# Patient Record
Sex: FEMALE | Race: WHITE | NOT HISPANIC OR LATINO | Employment: UNEMPLOYED | URBAN - NONMETROPOLITAN AREA
[De-identification: names, ages, dates, MRNs, and addresses within clinical notes are randomized per-mention and may not be internally consistent; named-entity substitution may affect disease eponyms.]

---

## 2019-07-03 PROCEDURE — 99283 EMERGENCY DEPT VISIT LOW MDM: CPT

## 2019-07-04 ENCOUNTER — HOSPITAL ENCOUNTER (EMERGENCY)
Facility: HOSPITAL | Age: 44
Discharge: HOME | End: 2019-07-04
Attending: EMERGENCY MEDICINE | Admitting: EMERGENCY MEDICINE

## 2019-07-04 VITALS
SYSTOLIC BLOOD PRESSURE: 155 MMHG | TEMPERATURE: 97.3 F | HEIGHT: 63 IN | WEIGHT: 198.4 LBS | RESPIRATION RATE: 20 BRPM | BODY MASS INDEX: 35.15 KG/M2 | OXYGEN SATURATION: 94 % | DIASTOLIC BLOOD PRESSURE: 106 MMHG | HEART RATE: 118 BPM

## 2019-07-04 DIAGNOSIS — K02.9 PAIN DUE TO DENTAL CARIES: Primary | ICD-10-CM

## 2019-07-04 RX ORDER — LOSARTAN POTASSIUM 50 MG/1
25 TABLET ORAL DAILY
COMMUNITY
End: 2022-11-05 | Stop reason: HOSPADM

## 2019-07-04 RX ORDER — CLINDAMYCIN HYDROCHLORIDE 300 MG/1
300 CAPSULE ORAL 4 TIMES DAILY
Qty: 40 CAPSULE | Refills: 0 | Status: SHIPPED | OUTPATIENT
Start: 2019-07-04 | End: 2019-07-14

## 2019-07-04 RX ORDER — CLINDAMYCIN HYDROCHLORIDE 150 MG/1
300 CAPSULE ORAL ONCE
Status: COMPLETED | OUTPATIENT
Start: 2019-07-04 | End: 2019-07-04

## 2019-07-04 RX ORDER — CEPHALEXIN 500 MG/1
500 CAPSULE ORAL 4 TIMES DAILY
COMMUNITY
End: 2019-07-04

## 2019-07-04 RX ORDER — HYDROCODONE BITARTRATE AND ACETAMINOPHEN 5; 325 MG/1; MG/1
1 TABLET ORAL ONCE
Status: COMPLETED | OUTPATIENT
Start: 2019-07-04 | End: 2019-07-04

## 2019-07-04 RX ADMIN — HYDROCODONE BITARTRATE AND ACETAMINOPHEN 1 TABLET: 5; 325 TABLET ORAL at 00:42

## 2019-07-04 RX ADMIN — CLINDAMYCIN HYDROCHLORIDE 300 MG: 150 CAPSULE ORAL at 00:42

## 2019-07-04 RX ADMIN — TOPICAL ANESTHETIC 1 SPRAY: 200 SPRAY DENTAL; PERIODONTAL at 00:39

## 2019-07-04 RX ADMIN — LIDOCAINE HYDROCHLORIDE 5 ML: 20 SOLUTION ORAL; TOPICAL at 00:39

## 2019-07-04 NOTE — ED PROVIDER NOTES
Subjective   43-year-old female presents to the ER with toothache that began 4 days ago.  Patient states that is the left lower side.  He states that she called her primary care doctor and they called her and Keflex which she has been taking for the past 4 days.  She continues to have pain despite this.  She denies any facial pain, swelling, fever, chills, other systemic symptoms.        History provided by:  Patient   used: No    Dental Pain   Location:  Lower  Quality:  Aching, localized and throbbing  Severity:  Moderate  Onset quality:  Sudden  Timing:  Constant  Progression:  Unchanged  Chronicity:  New  Context: dental caries    Relieved by:  None tried  Worsened by:  Pressure and touching  Ineffective treatments:  None tried  Associated symptoms: no congestion, no difficulty swallowing, no facial pain, no facial swelling, no fever, no gum swelling, no headaches, no neck pain and no neck swelling    Risk factors: lack of dental care and smoking        Review of Systems   Constitutional: Negative for activity change and fever.   HENT: Negative for congestion, facial swelling, rhinorrhea and sore throat.    Eyes: Negative for pain and redness.   Respiratory: Negative for cough, shortness of breath and wheezing.    Cardiovascular: Negative for chest pain.   Gastrointestinal: Negative for abdominal distention, diarrhea, nausea and vomiting.   Endocrine: Negative for polyphagia and polyuria.   Genitourinary: Negative for decreased urine volume, difficulty urinating and dysuria.   Musculoskeletal: Negative for arthralgias, myalgias and neck pain.   Skin: Negative for rash and wound.   Allergic/Immunologic: Negative for food allergies and immunocompromised state.   Neurological: Negative for dizziness and headaches.   Hematological: Negative for adenopathy. Does not bruise/bleed easily.   Psychiatric/Behavioral: Negative for agitation and confusion.   All other systems reviewed and are  negative.      Past Medical History:   Diagnosis Date   • Hypertension        Allergies   Allergen Reactions   • Codeine Nausea And Vomiting   • Penicillins Unknown (See Comments)     As a child        Past Surgical History:   Procedure Laterality Date   • TUBAL ABDOMINAL LIGATION         History reviewed. No pertinent family history.    Social History     Tobacco Use   • Smoking status: Current Every Day Smoker     Packs/day: 0.50     Types: Cigarettes   Substance and Sexual Activity   • Alcohol use: No     Frequency: Never   • Drug use: No   • Sexual activity: Defer           Objective   Physical Exam   Constitutional: She is oriented to person, place, and time. She appears well-developed and well-nourished.   HENT:   Head: Normocephalic and atraumatic.   Mouth/Throat:       Eyes: EOM are normal. Pupils are equal, round, and reactive to light.   Neck: Normal range of motion. Neck supple.   Cardiovascular: Normal rate, regular rhythm and normal heart sounds.   Pulmonary/Chest: Effort normal and breath sounds normal.   Abdominal: Soft. Bowel sounds are normal.   Musculoskeletal: Normal range of motion.   Neurological: She is alert and oriented to person, place, and time.   Skin: Skin is warm and dry.   Psychiatric: She has a normal mood and affect. Her behavior is normal. Judgment and thought content normal.   Nursing note and vitals reviewed.      Procedures           ED Course                  MDM  Number of Diagnoses or Management Options     Amount and/or Complexity of Data Reviewed  Clinical lab tests: ordered and reviewed  Tests in the radiology section of CPT®: ordered and reviewed  Tests in the medicine section of CPT®: ordered and reviewed    Patient Progress  Patient progress: stable        Final diagnoses:   Pain due to dental caries            Onofre Leonard PA  07/04/19 0026

## 2021-04-13 RX ORDER — METHYLPHENIDATE HYDROCHLORIDE 5 MG/1
5 TABLET ORAL 2 TIMES DAILY
Qty: 60 TABLET | Refills: 0 | Status: SHIPPED | OUTPATIENT
Start: 2021-04-13 | End: 2021-05-12 | Stop reason: SDUPTHER

## 2021-04-14 ENCOUNTER — NURSING HOME (OUTPATIENT)
Dept: INTERNAL MEDICINE | Facility: CLINIC | Age: 46
End: 2021-04-14

## 2021-04-14 DIAGNOSIS — I62.9 INTRACRANIAL BLEED (HCC): Primary | ICD-10-CM

## 2021-04-14 DIAGNOSIS — Z86.718 HISTORY OF DVT (DEEP VEIN THROMBOSIS): ICD-10-CM

## 2021-04-14 DIAGNOSIS — Z98.890 STATUS POST CRANIOTOMY: ICD-10-CM

## 2021-04-14 DIAGNOSIS — Z93.1 STATUS POST INSERTION OF PERCUTANEOUS ENDOSCOPIC GASTROSTOMY (PEG) TUBE (HCC): ICD-10-CM

## 2021-04-14 DIAGNOSIS — R51.9 WORSENING HEADACHES: ICD-10-CM

## 2021-04-14 PROCEDURE — 99305 1ST NF CARE MODERATE MDM 35: CPT | Performed by: INTERNAL MEDICINE

## 2021-04-14 RX ORDER — HYDROCODONE BITARTRATE AND ACETAMINOPHEN 5; 325 MG/1; MG/1
TABLET ORAL
Qty: 120 TABLET | Refills: 0 | Status: SHIPPED | OUTPATIENT
Start: 2021-04-14 | End: 2021-05-04 | Stop reason: SDUPTHER

## 2021-04-15 ENCOUNTER — TRANSCRIBE ORDERS (OUTPATIENT)
Dept: ADMINISTRATIVE | Facility: HOSPITAL | Age: 46
End: 2021-04-15

## 2021-04-15 DIAGNOSIS — I62.9 NON-TRAUMATIC INTRACRANIAL HEMORRHAGE (HCC): Primary | ICD-10-CM

## 2021-04-19 ENCOUNTER — HOSPITAL ENCOUNTER (OUTPATIENT)
Dept: CT IMAGING | Facility: HOSPITAL | Age: 46
Discharge: HOME | End: 2021-04-19
Admitting: INTERNAL MEDICINE

## 2021-04-19 DIAGNOSIS — I62.9 NON-TRAUMATIC INTRACRANIAL HEMORRHAGE (HCC): ICD-10-CM

## 2021-04-19 PROCEDURE — 70450 CT HEAD/BRAIN W/O DYE: CPT

## 2021-04-20 ENCOUNTER — NURSING HOME (OUTPATIENT)
Dept: INTERNAL MEDICINE | Facility: CLINIC | Age: 46
End: 2021-04-20

## 2021-04-20 VITALS
RESPIRATION RATE: 16 BRPM | SYSTOLIC BLOOD PRESSURE: 112 MMHG | BODY MASS INDEX: 33.13 KG/M2 | WEIGHT: 187 LBS | HEART RATE: 84 BPM | TEMPERATURE: 97.8 F | OXYGEN SATURATION: 98 % | DIASTOLIC BLOOD PRESSURE: 70 MMHG

## 2021-04-20 DIAGNOSIS — I82.402 ACUTE DEEP VEIN THROMBOSIS (DVT) OF LEFT LOWER EXTREMITY, UNSPECIFIED VEIN (HCC): ICD-10-CM

## 2021-04-20 DIAGNOSIS — R13.12 OROPHARYNGEAL DYSPHAGIA: ICD-10-CM

## 2021-04-20 DIAGNOSIS — I10 ESSENTIAL HYPERTENSION: ICD-10-CM

## 2021-04-20 DIAGNOSIS — F32.9 MAJOR DEPRESSIVE DISORDER, REMISSION STATUS UNSPECIFIED, UNSPECIFIED WHETHER RECURRENT: ICD-10-CM

## 2021-04-20 DIAGNOSIS — R53.81 DEBILITY: ICD-10-CM

## 2021-04-20 DIAGNOSIS — I62.9 INTRACRANIAL HEMORRHAGE (HCC): Primary | ICD-10-CM

## 2021-04-20 DIAGNOSIS — G89.29 OTHER CHRONIC PAIN: ICD-10-CM

## 2021-04-20 DIAGNOSIS — G93.41 METABOLIC ENCEPHALOPATHY: ICD-10-CM

## 2021-04-20 DIAGNOSIS — E44.1 MILD PROTEIN-CALORIE MALNUTRITION (HCC): ICD-10-CM

## 2021-04-20 PROCEDURE — 99309 SBSQ NF CARE MODERATE MDM 30: CPT | Performed by: PHYSICIAN ASSISTANT

## 2021-04-20 NOTE — PROGRESS NOTES
Nursing Home Progress Note        Doe Stacy DO []  WILEY Gage []  [address] [place] [address]  Phone: [phone]  Fax: [phone] Edwin Hunter MD []  Lasha Flores DO []  Xiomara Davidson PA-C [x]   [address] [place] [address]  Phone: [phone]  Fax: [phone]     PATIENT NAME: Roxi Valerio                                                                          YOB: 1975           DATE OF SERVICE: 04/20/2021  FACILITY:  [] [facility]   [x] [facility]   [] [facility]   [] [facility]   [] [facility]    CHIEF COMPLAINT:  Follow up, intracranial hemorrhage      HISTORY OF PRESENT ILLNESS:   Ms. Valerio is a 45-year of age female with history of intracranial hemorrhage and acute hypoxic respiratory failure.  Patient is status post craniotomy on 2/14.  She had repeat left decompressive craniectomy with duraplasty for malignant brain swelling.  She required tracheostomy and PEG tube placement.  She was then transferred to [facility] for further care.  Patient was successfully decannulated on 3/29.  Diet was advanced by speech therapy.  She continues to utilize tube feeds.  She has significant aphasia, is able to follow some commands and communicate with y/n questions.  She was transferred to this facility, as patient is from [place].  She was recommended to have follow-up with neurosurgery in 1 to 2 weeks regarding plans for bone flap placement or any further intervention.  Unfortunately neurosurgery  located in [place].  She has c/o head pain, subsequently CT as outpatient ordered.  Referral has been placed with  neurosurgery for transfer of care.  Nursing have not noted any continued head pain.  Upon assessment, patient has limited communication, but answers no to any acute complaints.  She is tearful, had a visit with her  today.      PAST MEDICAL & SURGICAL HISTORY:   Past Medical History:   Diagnosis Date   • Hypertension       Past Surgical History:    Procedure Laterality Date   • TUBAL ABDOMINAL LIGATION           MEDICATIONS:  I have reviewed and reconciled the patients medication list in the patients chart at the skilled nursing facility today.      ALLERGIES:  Allergies   Allergen Reactions   • Codeine Nausea And Vomiting   • Penicillins Unknown (See Comments)     As a child          SOCIAL HISTORY:  Social History     Socioeconomic History   • Marital status:      Spouse name: Not on file   • Number of children: Not on file   • Years of education: Not on file   • Highest education level: Not on file   Tobacco Use   • Smoking status: Current Every Day Smoker     Packs/day: 0.50     Types: Cigarettes   Substance and Sexual Activity   • Alcohol use: No   • Drug use: No   • Sexual activity: Defer       FAMILY HISTORY:  No family history on file.    REVIEW OF SYSTEMS:    Positive:  Unable to preform ROS due to aphasia.     PHYSICAL EXAMINATION:     VITAL SIGNS:  /70   Pulse 84   Temp 97.8 °F (36.6 °C)   Resp 16   Wt 84.8 kg (187 lb)   SpO2 98%   BMI 33.13 kg/m²     Constitutional: Tearful, no acute distress  HENT:   Head:  S/p craniotomy, surgical site is clean and intact.     Eyes: Conjunctivae and EOM are normal. Pupils are equal, round, and reactive to light.   Neck: Normal range of motion. Neck supple, previous trach stoma healed. No JVD present.   Cardiovascular: Normal rate, regular rhythm.   Pulmonary/Chest: Effort normal and breath sounds normal. No respiratory distress.   Abdominal: Soft. Bowel sounds are normal. No distention or tenderness.    Musculoskeletal: Normal range of motion. No edema.  Neurological: At baseline, able to point to yes/no on communication board.  Skin: Skin is warm and dry.   Psychiatric: Tearful throughout visit.   Nursing note and vitals reviewed.    RECORDS REVIEW:   CT reviewed, encephalomalacia noted, otherwise no acute intracranial abnormality.     ASSESSMENT     Diagnoses and all orders for this  visit:    1. Intracranial hemorrhage (CMS/HCC) (Primary)    2. Metabolic encephalopathy    3. Acute deep vein thrombosis (DVT) of left lower extremity, unspecified vein (CMS/HCC)    4. Mild protein-calorie malnutrition (CMS/HCC)    5. Oropharyngeal dysphagia    6. Essential hypertension    7. Other chronic pain    8. Debility    9. Major depressive disorder, remission status unspecified, unspecified whether recurrent        PLAN  Intracranial hemorrhage status post craniotomy: Referral has been placed to establish care with neurosurgery that is local.  CT reviewed noted some encephalomalacia otherwise no intracranial abnormalities.  No reports continued pain.  She continues to use helmet.  Continue to monitor closely.    Encephalopathy: Improving.  She is communicating well with staff and participating in therapy services.  Acute DVT: Underwent IR guided lysis on 211, unfortunately cranial hemorrhage afterwards.  IVC filter in place.  She remains on Lovenox.    Mild protein calorie malnutrition/dysphagia: Continues following with SLP.  She is tolerating tube feeding well.  Diet has been advanced and she is tolerating p.o. intake.  She is sitting up in wheelchair eating lunch upon assessment today.  No coughing or strangulation noted.    Hypertension: Controlled.  Continue current medications.      Chronic pain: Controlled.  Continue oxycodone.    Debility: Continue supportive care at SNF with ADLs.    Depression: Is very tearful during assessment today.  She is visiting regularly with her family.  We will start Celexa 10 mg daily and place referral to in-house psych services.     [x]  Discussed Patient in detail with nursing/staff, addressed all needs today.     [x]  Plan of Care Reviewed   []  PT/OT Reviewed   []  Order Changes  []  Discharge Plans Reviewed  [x]  Advance Directive on file with Nursing Home.   [x]  POA on file with Nursing Home.    [x]  Code Status listed:  [x]  Full Code   []  DNR         Xiomara  KOLTON Davidson.  4/21/2021

## 2021-04-27 ENCOUNTER — NURSING HOME (OUTPATIENT)
Dept: INTERNAL MEDICINE | Facility: CLINIC | Age: 46
End: 2021-04-27

## 2021-04-27 DIAGNOSIS — I82.402 ACUTE DEEP VEIN THROMBOSIS (DVT) OF LEFT LOWER EXTREMITY, UNSPECIFIED VEIN (HCC): ICD-10-CM

## 2021-04-27 DIAGNOSIS — G89.29 OTHER CHRONIC PAIN: ICD-10-CM

## 2021-04-27 DIAGNOSIS — R13.12 OROPHARYNGEAL DYSPHAGIA: ICD-10-CM

## 2021-04-27 DIAGNOSIS — E44.1 MILD PROTEIN-CALORIE MALNUTRITION (HCC): ICD-10-CM

## 2021-04-27 DIAGNOSIS — G93.41 METABOLIC ENCEPHALOPATHY: ICD-10-CM

## 2021-04-27 DIAGNOSIS — I62.9 INTRACRANIAL HEMORRHAGE (HCC): Primary | ICD-10-CM

## 2021-04-27 DIAGNOSIS — F32.9 MAJOR DEPRESSIVE DISORDER, REMISSION STATUS UNSPECIFIED, UNSPECIFIED WHETHER RECURRENT: ICD-10-CM

## 2021-04-27 DIAGNOSIS — R53.81 DEBILITY: ICD-10-CM

## 2021-04-27 DIAGNOSIS — I10 ESSENTIAL HYPERTENSION: ICD-10-CM

## 2021-04-27 PROCEDURE — 99308 SBSQ NF CARE LOW MDM 20: CPT | Performed by: PHYSICIAN ASSISTANT

## 2021-05-03 VITALS
RESPIRATION RATE: 16 BRPM | DIASTOLIC BLOOD PRESSURE: 82 MMHG | TEMPERATURE: 98.2 F | OXYGEN SATURATION: 96 % | HEART RATE: 72 BPM | SYSTOLIC BLOOD PRESSURE: 117 MMHG

## 2021-05-03 NOTE — PROGRESS NOTES
Nursing Home Progress Note        Doe Stacy DO []  WILEY Gage []  [address] [place] [address]  Phone: [phone]  Fax: [phone] Edwin Hunter MD []  Lasha Flores DO []  Xiomara Davidson PA-C [x]   [address] [place] [address]  Phone: [phone]  Fax: [phone]     PATIENT NAME: Roxi Valerio                                                                          YOB: 1975           DATE OF SERVICE: 04/27/2021  FACILITY:  [] [facility]   [x] [place]   [] [facility]   [] [facility]   [] [facility]    CHIEF COMPLAINT:  Follow up, intracranial hemorrhage      HISTORY OF PRESENT ILLNESS:   Ms. Valerio is a 45-year of age female with history of intracranial hemorrhage and acute hypoxic respiratory failure.  Patient is status post craniotomy on 2/14.  She had repeat left decompressive craniectomy with duraplasty for malignant brain swelling.  She required tracheostomy and PEG tube placement.      Patient has remained in stable condition since last assessment.  She continues to improve in regards to communication.  Therapy are working with communication boards.  She is also tolerating PO intake well.  Often refuses tube feeds so that she may enjoy the food her  brings to her.  Weights are stable.  She has referral placed with [facility], appoitment scheduled.  Upon assessment today she is eating a Big Mac, is smiling and in good spirits.  She denies any acute concerns.     PAST MEDICAL & SURGICAL HISTORY:   Past Medical History:   Diagnosis Date   • Hypertension       Past Surgical History:   Procedure Laterality Date   • TUBAL ABDOMINAL LIGATION           MEDICATIONS:  I have reviewed and reconciled the patients medication list in the patients chart at the skilled nursing facility today.      ALLERGIES:  Allergies   Allergen Reactions   • Codeine Nausea And Vomiting   • Penicillins Unknown (See Comments)     As a child          SOCIAL  HISTORY:  Social History     Socioeconomic History   • Marital status:      Spouse name: Not on file   • Number of children: Not on file   • Years of education: Not on file   • Highest education level: Not on file   Tobacco Use   • Smoking status: Current Every Day Smoker     Packs/day: 0.50     Types: Cigarettes   Substance and Sexual Activity   • Alcohol use: No   • Drug use: No   • Sexual activity: Defer       FAMILY HISTORY:  No family history on file.    REVIEW OF SYSTEMS:    Positive:  Unable to preform ROS due to aphasia.     PHYSICAL EXAMINATION:     VITAL SIGNS:  /82   Pulse 72   Temp 98.2 °F (36.8 °C)   Resp 16   SpO2 96%     Constitutional: Smiling, no acute distress  HENT:   Head:  S/p craniotomy, surgical site is clean and intact.     Eyes: Conjunctivae and EOM are normal. Pupils are equal, round, and reactive to light.   Neck: Normal range of motion. Neck supple, previous trach stoma healed. No JVD present.   Cardiovascular: Normal rate, regular rhythm.   Pulmonary/Chest: Effort normal and breath sounds normal. No respiratory distress.   Abdominal: Soft. Bowel sounds are normal. No distention or tenderness.  S/p PEG  Musculoskeletal: Normal range of motion. No edema.    Neurological: At baseline, able to point to yes/no on communication board.  Skin: Skin is warm and dry.   Psychiatric: Smiling, in good spirits/mood  Nursing note and vitals reviewed.    RECORDS REVIEW:   N/A.     ASSESSMENT     Diagnoses and all orders for this visit:    1. Intracranial hemorrhage (CMS/HCC) (Primary)    2. Metabolic encephalopathy    3. Acute deep vein thrombosis (DVT) of left lower extremity, unspecified vein (CMS/HCC)    4. Mild protein-calorie malnutrition (CMS/HCC)    5. Oropharyngeal dysphagia    6. Essential hypertension    7. Other chronic pain    8. Debility    9. Major depressive disorder, remission status unspecified, unspecified whether recurrent        PLAN  Intracranial hemorrhage status  post craniotomy: Has appointment with neurosurgery at .  Continue working with therapy services for continued improvement to cognition.  She denies any continued head pain.     Encephalopathy: Improving.  She is communicating well with staff and participating in therapy services.    Acute DVT: Underwent IR guided lysis on 2/11, unfortunately cranial hemorrhage afterwards.  IVC filter in place.  She remains on Lovenox.    Mild protein calorie malnutrition/dysphagia: Continues following with SLP.  Refusing tube feeds so she may enjoy food brought by her .  Nursing continue to monitor weights closely.    Diet has been advanced and she is tolerating p.o. intake.  She is sitting up in wheelchair eating lunch upon assessment today.  No coughing or strangulation noted.    Hypertension: Controlled.  Continue current medications.      Chronic pain: Controlled.  Continue oxycodone.    Debility: Continue supportive care at SNF with ADLs.    Depression: Mood improved since last assessment.  She was recently started on celexa.     [x]  Discussed Patient in detail with nursing/staff, addressed all needs today.     [x]  Plan of Care Reviewed   []  PT/OT Reviewed   []  Order Changes  []  Discharge Plans Reviewed  [x]  Advance Directive on file with Nursing Home.   [x]  POA on file with Nursing Home.    [x]  Code Status listed:  [x]  Full Code   []  DNR         Xiomara Davidson PA-C.  5/3/2021

## 2021-05-04 ENCOUNTER — NURSING HOME (OUTPATIENT)
Dept: INTERNAL MEDICINE | Facility: CLINIC | Age: 46
End: 2021-05-04

## 2021-05-04 VITALS
HEART RATE: 75 BPM | TEMPERATURE: 97.9 F | OXYGEN SATURATION: 97 % | DIASTOLIC BLOOD PRESSURE: 53 MMHG | SYSTOLIC BLOOD PRESSURE: 91 MMHG | RESPIRATION RATE: 20 BRPM

## 2021-05-04 DIAGNOSIS — R47.01 APHASIA: ICD-10-CM

## 2021-05-04 DIAGNOSIS — I62.9 INTRACRANIAL HEMORRHAGE (HCC): Primary | ICD-10-CM

## 2021-05-04 DIAGNOSIS — G93.41 METABOLIC ENCEPHALOPATHY: ICD-10-CM

## 2021-05-04 DIAGNOSIS — I82.402 ACUTE DEEP VEIN THROMBOSIS (DVT) OF LEFT LOWER EXTREMITY, UNSPECIFIED VEIN (HCC): ICD-10-CM

## 2021-05-04 PROCEDURE — 99310 SBSQ NF CARE HIGH MDM 45: CPT | Performed by: PHYSICIAN ASSISTANT

## 2021-05-04 RX ORDER — HYDROCODONE BITARTRATE AND ACETAMINOPHEN 5; 325 MG/1; MG/1
TABLET ORAL
Qty: 120 TABLET | Refills: 0 | Status: SHIPPED | OUTPATIENT
Start: 2021-05-04 | End: 2021-05-25 | Stop reason: SDUPTHER

## 2021-05-04 NOTE — PROGRESS NOTES
Nursing Home Progress Note        Doe Stacy DO []  WILEY Gage []  [address] [place] [address]  Phone: [phone]  Fax: [phone] Edwin Hunter MD []  Lasha Flores DO []  Xiomara Davidson PA-C [x]   [address] [place] [address]  Phone: [phone]  Fax: [phone]     PATIENT NAME: Roxi Valerio                                                                          YOB: 1975           DATE OF SERVICE: 5/4/2021  FACILITY: [facility]    CHIEF COMPLAINT:  Arm pain      HISTORY OF PRESENT ILLNESS:   Ms. Valerio is a 45-year of age female with history of intracranial hemorrhage and acute hypoxic respiratory failure.  Patient is status post craniotomy on 2/14.  She had repeat left decompressive craniectomy with duraplasty for malignant brain swelling.  She required tracheostomy and PEG tube placement.  Has residual right sided weakness.      Patient presents today at request of nursing staff with complaint of right arm pain.  Patient has limited communication due to aphasia.  Myself and nurse present at bedside, patient is crying having difficulty with using communication board.  Therapy at bedside as well to supplement history.  Previous history of residual deficit to the right upper extremity, essentially flaccid.  Therapy report that patient has regained some tone to that extremity and they have been working with ROM exercises.  Patient began with onset of pain less then 24 hrs ago.  She describes that patient has been progressively worsening, improved after use of hydrocodone.  Denies any recent trauma, nursing do not note any recent falls.  Pain is diffuse to the elbow, wrist, shoulder.  She reports that movement intensifies the pain.      PAST MEDICAL & SURGICAL HISTORY:   Past Medical History:   Diagnosis Date   • Hypertension       Past Surgical History:   Procedure Laterality Date   • TUBAL ABDOMINAL LIGATION           MEDICATIONS:  I  have reviewed and reconciled the patients medication list in the patients chart at the skilled nursing facility today.      ALLERGIES:  Allergies   Allergen Reactions   • Codeine Nausea And Vomiting   • Penicillins Unknown (See Comments)     As a child          SOCIAL HISTORY:  Social History     Socioeconomic History   • Marital status:      Spouse name: Not on file   • Number of children: Not on file   • Years of education: Not on file   • Highest education level: Not on file   Tobacco Use   • Smoking status: Current Every Day Smoker     Packs/day: 0.50     Types: Cigarettes   Substance and Sexual Activity   • Alcohol use: No   • Drug use: No   • Sexual activity: Defer       FAMILY HISTORY:  No family history on file.    REVIEW OF SYSTEMS:    Limited due to aphasia  Right arm pain, aphasia, debility.     PHYSICAL EXAMINATION:     VITAL SIGNS:  BP 91/53   Pulse 75   Temp 97.9 °F (36.6 °C)   Resp 20   SpO2 97%        Repeat BP:  106/65    Constitutional: Chronically ill appearing female sitting upright in bed.   HENT:   Head: Normocephalic and atraumatic.   Eyes: Conjunctivae and EOM are normal. Pupils are equal, round, and reactive to light.   Neck: Normal range of motion. Neck supple. No JVD present.   Cardiovascular: Normal rate, regular rhythm and normal heart sounds.   Pulmonary/Chest: Effort normal and breath sounds normal. No respiratory distress.   Abdominal: Soft. Bowel sounds are normal. No distention or tenderness.    Musculoskeletal: Right sided weakness, no edema.   Neurological: Alert and oriented at baseline.  Skin: Skin is warm and dry.   Psychiatric: Tearful, anxious.   RUE: Neurovascularly intact.  Residual weakness noted.  No edema.  Pain with palpation especially range of motion to the wrist elbow and shoulder.  No obvious deformities.  No streaking.  The extremity is not hot to touch.  Nursing note and vitals reviewed.    RECORDS REVIEW:   N/A.     ASSESSMENT     Diagnoses and all  orders for this visit:    1. Intracranial hemorrhage (CMS/HCC) (Primary)    2. Metabolic encephalopathy    3. Acute deep vein thrombosis (DVT) of left lower extremity, unspecified vein (CMS/HCC)    4. Aphasia        PLAN  She continues to have improvement to her overall cognition, is tolerating PO diet well.  Working with therapy services daily, with notable improvement.   Unfortunately patient has developed RUE pain.  No trauma or specific injury reported.  Increased pain is most likely due to patient have increased tone to the extremity and increased ROM to the extremity during PT/OT.  Initially attempted topical biofreeze, patient reported this made her pain worse.  Will use plain lidocaine cream every 12 hours PRN.  Due to her recent history of DVT will rule out vascular cause with venous and arterial doppler.  This is less likely, as patient is currently on lovenox.  Will also obtain plain images including right wrist, elbow, shoulder x-ray.  Go ahead and increase hydrocodone 5/325 up to every 6 hours as needed.  Monitor patient condition closely, contact my cell for MD if acute changes noted.    [x]  Discussed Patient in detail with nursing/staff, addressed all needs today.     [x]  Plan of Care Reviewed   []  PT/OT Reviewed   []  Order Changes  []  Discharge Plans Reviewed  [x]  Advance Directive on file with Nursing Home.   [x]  POA on file with Nursing Home.    [x]  Code Status: FULL          Xiomara Davidson PA-C.  5/5/2021

## 2021-05-12 ENCOUNTER — DOCUMENTATION (OUTPATIENT)
Dept: FAMILY MEDICINE CLINIC | Facility: CLINIC | Age: 46
End: 2021-05-12

## 2021-05-12 LAB — HBA1C MFR BLD: 5.4 %

## 2021-05-12 RX ORDER — METHYLPHENIDATE HYDROCHLORIDE 5 MG/1
5 TABLET ORAL 2 TIMES DAILY
Qty: 60 TABLET | Refills: 0 | Status: SHIPPED | OUTPATIENT
Start: 2021-05-12 | End: 2021-06-11 | Stop reason: SDUPTHER

## 2021-05-14 ENCOUNTER — NURSING HOME (OUTPATIENT)
Dept: INTERNAL MEDICINE | Facility: CLINIC | Age: 46
End: 2021-05-14

## 2021-05-14 DIAGNOSIS — G93.41 METABOLIC ENCEPHALOPATHY: ICD-10-CM

## 2021-05-14 DIAGNOSIS — R47.01 APHASIA: ICD-10-CM

## 2021-05-14 DIAGNOSIS — I62.9 INTRACRANIAL HEMORRHAGE (HCC): Primary | ICD-10-CM

## 2021-05-14 DIAGNOSIS — G89.29 CHRONIC NONINTRACTABLE HEADACHE, UNSPECIFIED HEADACHE TYPE: ICD-10-CM

## 2021-05-14 DIAGNOSIS — G89.29 OTHER CHRONIC PAIN: ICD-10-CM

## 2021-05-14 DIAGNOSIS — R51.9 CHRONIC NONINTRACTABLE HEADACHE, UNSPECIFIED HEADACHE TYPE: ICD-10-CM

## 2021-05-14 DIAGNOSIS — R53.81 DEBILITY: ICD-10-CM

## 2021-05-14 PROCEDURE — 99308 SBSQ NF CARE LOW MDM 20: CPT | Performed by: PHYSICIAN ASSISTANT

## 2021-05-14 RX ORDER — TRAMADOL HYDROCHLORIDE 50 MG/1
50 TABLET ORAL EVERY 8 HOURS PRN
Qty: 90 TABLET | Refills: 3 | Status: SHIPPED | OUTPATIENT
Start: 2021-05-14 | End: 2021-06-15 | Stop reason: SDUPTHER

## 2021-05-18 ENCOUNTER — NURSING HOME (OUTPATIENT)
Dept: INTERNAL MEDICINE | Facility: CLINIC | Age: 46
End: 2021-05-18

## 2021-05-18 DIAGNOSIS — R21 FACIAL RASH: Primary | ICD-10-CM

## 2021-05-18 DIAGNOSIS — K94.23 LEAKING PEG TUBE (HCC): ICD-10-CM

## 2021-05-18 DIAGNOSIS — R53.81 DEBILITY: ICD-10-CM

## 2021-05-18 DIAGNOSIS — I10 ESSENTIAL HYPERTENSION: ICD-10-CM

## 2021-05-18 PROCEDURE — 99308 SBSQ NF CARE LOW MDM 20: CPT | Performed by: PHYSICIAN ASSISTANT

## 2021-05-25 RX ORDER — HYDROCODONE BITARTRATE AND ACETAMINOPHEN 5; 325 MG/1; MG/1
TABLET ORAL
Qty: 120 TABLET | Refills: 0 | Status: SHIPPED | OUTPATIENT
Start: 2021-05-25 | End: 2021-06-15 | Stop reason: SDUPTHER

## 2021-05-25 NOTE — TELEPHONE ENCOUNTER
[facility] NEW MEDICATION REQUEST FOR HYDROCODONE 5/325 MG.    DIRECTIONS: TAKE 1 TAB PO Q 6 HRS SCHEDULED.

## 2021-06-07 VITALS
DIASTOLIC BLOOD PRESSURE: 86 MMHG | SYSTOLIC BLOOD PRESSURE: 137 MMHG | OXYGEN SATURATION: 97 % | HEART RATE: 93 BPM | RESPIRATION RATE: 20 BRPM | TEMPERATURE: 98.3 F

## 2021-06-07 NOTE — PROGRESS NOTES
Nursing Home Progress Note        Doe Stacy DO []  WILEY Gage []  [address] [place] [address]  Phone: [phone]  Fax: [phone] Edwin Hunter MD []  Lasha Flores DO []  Xiomara Davidson PA-C [x]   [address] [place] [address]  Phone: [phone]  Fax: [phone]     PATIENT NAME: Roxi Valerio                                                                          YOB: 1975           DATE OF SERVICE: 5/14/2021  FACILITY: [facility]    CHIEF COMPLAINT:  Follow up on arm pain, chronic headaches.        HISTORY OF PRESENT ILLNESS:   Ms. Valerio is a 45-year of age female with history of intracranial hemorrhage and acute hypoxic respiratory failure.  Patient is status post craniotomy on 2/14.  She had repeat left decompressive craniectomy with duraplasty for malignant brain swelling.  She required tracheostomy and PEG tube placement.  Has residual right sided weakness.      Patient presents today at request of nursing staff with complaint of right arm pain, workup was reassuring.  Felt that symptoms mainly related to improved tone, to a previously flaccid extremity.  She has continued to work alongside therapy services with range of motion exercises.  Pain has significantly improved with use of hydrocodone.  She has been regularly taking this every 6 hours.  Staff deny any lethargy, poor appetite, constipation.  She has had recurrent headaches, which are chronic for patient.  Previously a CT was repeated, reassuring and negative for any acute intracranial pathology.  She was previously taking tramadol for her headaches and family have requested restarting this medicine patient.  Otherwise patient is doing well, tolerating p.o. diet.  Visits with family regularly.  Patient has limited communication due to aphasia.      PAST MEDICAL & SURGICAL HISTORY:   Past Medical History:   Diagnosis Date   • Hypertension       Past Surgical History:    Procedure Laterality Date   • TUBAL ABDOMINAL LIGATION           MEDICATIONS:  I have reviewed and reconciled the patients medication list in the patients chart at the skilled nursing facility today.      ALLERGIES:  Allergies   Allergen Reactions   • Codeine Nausea And Vomiting   • Penicillins Unknown (See Comments)     As a child          SOCIAL HISTORY:  Social History     Socioeconomic History   • Marital status:      Spouse name: Not on file   • Number of children: Not on file   • Years of education: Not on file   • Highest education level: Not on file   Tobacco Use   • Smoking status: Current Every Day Smoker     Packs/day: 0.50     Types: Cigarettes   Substance and Sexual Activity   • Alcohol use: No   • Drug use: No   • Sexual activity: Defer       FAMILY HISTORY:  No family history on file.    REVIEW OF SYSTEMS:    Limited due to aphasia  Right arm pain, aphasia, debility, chronic headaches.    PHYSICAL EXAMINATION:     VITAL SIGNS:  /86   Pulse 93   Temp 98.3 °F (36.8 °C)   Resp 20   SpO2 97%        Constitutional: Chronically ill appearing female sitting upright in chair.   HENT:   Head: Chronic skull deformity present.    Eyes: Conjunctivae and EOM are normal. Pupils are equal, round, and reactive to light.   Neck: Normal range of motion. Neck supple. No JVD present.   Cardiovascular: Normal rate, regular rhythm and normal heart sounds.   Pulmonary/Chest: Effort normal and breath sounds normal. No respiratory distress.   Abdominal: Soft. Bowel sounds are normal. No distention or tenderness.    Musculoskeletal: Right sided weakness, no edema.   Neurological: Alert and oriented at baseline.  Skin: Skin is warm and dry.   Psychiatric: Pleasant, normal behavior.  Nursing note and vitals reviewed.    RECORDS REVIEW:   5/11: BUN 14, creatinine 0.7, hemoglobin 12.6, hematocrit 37.5, TSH 0.842, A1c 5.4.    ASSESSMENT     Diagnoses and all orders for this visit:    1. Intracranial hemorrhage  (CMS/[facility]) (Primary)    2. Metabolic encephalopathy    3. Aphasia    4. Other chronic pain    5. Debility    6. Chronic nonintractable headache, unspecified headache type        PLAN  X-rays and Dopplers reassuring for her right arm pain.  This has been well controlled with hydrocodone 5/325 every 6 hours as needed.  We will go ahead and schedule this medication with orders to hold if she is lethargic.  Monitor for increased symptoms of constipation.  She continues to work alongside with therapy services with improvement to her tone from her previous right-sided weakness.  She follows with neurology.  She does have history of chronic headaches previously addressed with tramadol.  We will start this at a low dose as requested by family 50 mg every 8 hours as needed.  Monitor closely for any acute changes in condition or worsening of symptoms.    [x]  Discussed Patient in detail with nursing/staff, addressed all needs today.     [x]  Plan of Care Reviewed   []  PT/OT Reviewed   []  Order Changes  []  Discharge Plans Reviewed  [x]  Advance Directive on file with Nursing Home.   [x]  POA on file with Nursing Home.    [x]  Code Status: FULL          Xiomara Davidson PA-C.  6/7/2021

## 2021-06-09 ENCOUNTER — NURSING HOME (OUTPATIENT)
Dept: INTERNAL MEDICINE | Facility: CLINIC | Age: 46
End: 2021-06-09

## 2021-06-09 DIAGNOSIS — R47.01 APHASIA: ICD-10-CM

## 2021-06-09 DIAGNOSIS — Z86.718 HISTORY OF DVT (DEEP VEIN THROMBOSIS): ICD-10-CM

## 2021-06-09 DIAGNOSIS — I62.9 INTRACRANIAL HEMORRHAGE (HCC): Primary | ICD-10-CM

## 2021-06-09 PROCEDURE — 99308 SBSQ NF CARE LOW MDM 20: CPT | Performed by: INTERNAL MEDICINE

## 2021-06-11 RX ORDER — METHYLPHENIDATE HYDROCHLORIDE 5 MG/1
5 TABLET ORAL 2 TIMES DAILY
Qty: 60 TABLET | Refills: 0 | Status: SHIPPED | OUTPATIENT
Start: 2021-06-11 | End: 2021-06-15 | Stop reason: SDUPTHER

## 2021-06-13 VITALS
OXYGEN SATURATION: 94 % | SYSTOLIC BLOOD PRESSURE: 111 MMHG | HEART RATE: 82 BPM | TEMPERATURE: 98.1 F | DIASTOLIC BLOOD PRESSURE: 80 MMHG | RESPIRATION RATE: 20 BRPM

## 2021-06-14 NOTE — PROGRESS NOTES
Nursing Home Progress Note        Doe Stacy DO []  WILEY Gage []  [address] [place] [address]  Phone: [phone]  Fax: [phone] Edwin Hunter MD []  Lasha Flores DO []  Xiomara Davidson PA-C [x]   [address] [place] [address]  Phone: [phone]  Fax: [phone]     PATIENT NAME: Roxi Valerio                                                                          YOB: 1975           DATE OF SERVICE: 5/18/2021  FACILITY: [facility]    CHIEF COMPLAINT:  Flaking and erythema to face.      HISTORY OF PRESENT ILLNESS:   Ms. Valerio is a 45-year of age female with history of intracranial hemorrhage and acute hypoxic respiratory failure.  Patient is status post craniotomy on 2/14.  She had repeat left decompressive craniectomy with duraplasty for malignant brain swelling.  She required tracheostomy and PEG tube placement.  Has residual right sided weakness.      Patient presents today at request of nursing staff with complaint of flaking and redness to face.  Per patient's , she has had similar symptoms in the past.  Staff deny any new medications or lotions.  No additional areas with rash noted.  She has remained afebrile.  She denies any pruritus or pain.  Continues to tolerate p.o. intake well without problems.      PAST MEDICAL & SURGICAL HISTORY:   Past Medical History:   Diagnosis Date   • Hypertension       Past Surgical History:   Procedure Laterality Date   • TUBAL ABDOMINAL LIGATION           MEDICATIONS:  I have reviewed and reconciled the patients medication list in the patients chart at the skilled nursing facility today.      ALLERGIES:  Allergies   Allergen Reactions   • Codeine Nausea And Vomiting   • Penicillins Unknown (See Comments)     As a child          SOCIAL HISTORY:  Social History     Socioeconomic History   • Marital status:      Spouse name: Not on file   • Number of children: Not on file   • Years of  education: Not on file   • Highest education level: Not on file   Tobacco Use   • Smoking status: Current Every Day Smoker     Packs/day: 0.50     Types: Cigarettes   Substance and Sexual Activity   • Alcohol use: No   • Drug use: No   • Sexual activity: Defer       FAMILY HISTORY:  No family history on file.    REVIEW OF SYSTEMS:    Limited due to aphasia  Right arm pain, aphasia, debility, chronic headaches, flaking and erythema to face.    PHYSICAL EXAMINATION:     VITAL SIGNS:  /80   Pulse 82   Temp 98.1 °F (36.7 °C)   Resp 20   SpO2 94%        Constitutional: Chronically ill appearing female sitting upright in chair.  No acute distress.  HENT:   Head: Chronic skull deformity present.    Eyes: Conjunctivae and EOM are normal. Pupils are equal, round, and reactive to light.   Neck: Normal range of motion. Neck supple. No JVD present.   Cardiovascular: Normal rate, regular rhythm and normal heart sounds.   Pulmonary/Chest: Effort normal and breath sounds normal. No respiratory distress.   Abdominal: Soft. Bowel sounds are normal. PEG site has scant drainage.  No erythema.  No tenderness or distention.  Musculoskeletal: Right sided weakness, no edema.   Neurological: Alert and oriented at baseline.  Skin: Skin is warm and dry.  There is diffuse erythema and flaking to the face.  It is not hot to touch.  No drainage.   Psychiatric: Pleasant, normal behavior.  Nursing note and vitals reviewed.    RECORDS REVIEW:   N/A.    ASSESSMENT     Diagnoses and all orders for this visit:    1. Facial rash (Primary)    2. Leaking PEG tube (CMS/HCC)    3. Essential hypertension    4. Debility        PLAN  Facial rash: We will address with mild steroid and moisturizer for the next 7 days.  Can continue moisturizer as needed.  PEG: Some mild drainage surrounding the PEG noted upon assessment today.  Discussed with wound care, continue with drain sponge and Skin-Prep.  Monitor site closely for any worsening signs or  symptoms.  Hypertension: Controlled.  Continue current medications.      [x]  Discussed Patient in detail with nursing/staff, addressed all needs today.     [x]  Plan of Care Reviewed   []  PT/OT Reviewed   []  Order Changes  []  Discharge Plans Reviewed  [x]  Advance Directive on file with Nursing Home.   [x]  POA on file with Nursing Home.    [x]  Code Status: FULL       Patient plans for discharge on 6/15 to home.  She will require wheelchair upon discharge.  Patient's mobility limitation impairs ability to participate in 1 or more activities of daily living.  This limitation cannot be resolved by use of a cane or walker and patient is able to safely use a wheelchair.  Her functional mobility deficit can be resolved by the use of a wheelchair and she can maneuver this independently as well as has a caretaker to assist patient with maneuvering.  She has the strength to maneuver the wheelchair and so does her caretaker.       Xiomara Davidson PA-C.  6/13/2021

## 2021-06-15 ENCOUNTER — NURSING HOME (OUTPATIENT)
Dept: INTERNAL MEDICINE | Facility: CLINIC | Age: 46
End: 2021-06-15

## 2021-06-15 DIAGNOSIS — R53.81 DEBILITY: ICD-10-CM

## 2021-06-15 DIAGNOSIS — G93.41 METABOLIC ENCEPHALOPATHY: ICD-10-CM

## 2021-06-15 DIAGNOSIS — I82.402 ACUTE DEEP VEIN THROMBOSIS (DVT) OF LEFT LOWER EXTREMITY, UNSPECIFIED VEIN (HCC): ICD-10-CM

## 2021-06-15 DIAGNOSIS — J96.01 ACUTE RESPIRATORY FAILURE WITH HYPOXIA (HCC): ICD-10-CM

## 2021-06-15 DIAGNOSIS — R47.01 APHASIA: ICD-10-CM

## 2021-06-15 DIAGNOSIS — R21 FACIAL RASH: ICD-10-CM

## 2021-06-15 DIAGNOSIS — F32.9 MAJOR DEPRESSIVE DISORDER, REMISSION STATUS UNSPECIFIED, UNSPECIFIED WHETHER RECURRENT: ICD-10-CM

## 2021-06-15 DIAGNOSIS — I10 ESSENTIAL HYPERTENSION: ICD-10-CM

## 2021-06-15 DIAGNOSIS — R13.12 OROPHARYNGEAL DYSPHAGIA: ICD-10-CM

## 2021-06-15 DIAGNOSIS — I61.9 LEFT-SIDED NONTRAUMATIC INTRACEREBRAL HEMORRHAGE, UNSPECIFIED CEREBRAL LOCATION (HCC): Primary | ICD-10-CM

## 2021-06-15 DIAGNOSIS — G89.29 OTHER CHRONIC PAIN: ICD-10-CM

## 2021-06-15 PROCEDURE — 99315 NF DSCHRG MGMT 30 MIN/LESS: CPT | Performed by: PHYSICIAN ASSISTANT

## 2021-06-15 RX ORDER — TRAMADOL HYDROCHLORIDE 50 MG/1
50 TABLET ORAL EVERY 8 HOURS PRN
Qty: 90 TABLET | Refills: 0 | Status: SHIPPED | OUTPATIENT
Start: 2021-06-15

## 2021-06-15 RX ORDER — HYDROCODONE BITARTRATE AND ACETAMINOPHEN 5; 325 MG/1; MG/1
TABLET ORAL
Qty: 120 TABLET | Refills: 0 | Status: SHIPPED | OUTPATIENT
Start: 2021-06-15 | End: 2022-11-05 | Stop reason: HOSPADM

## 2021-06-15 RX ORDER — METHYLPHENIDATE HYDROCHLORIDE 5 MG/1
5 TABLET ORAL 2 TIMES DAILY
Qty: 60 TABLET | Refills: 0 | Status: SHIPPED | OUTPATIENT
Start: 2021-06-15 | End: 2022-11-05 | Stop reason: HOSPADM

## 2021-06-15 NOTE — TELEPHONE ENCOUNTER
REQUEST FROM [facility]  DISCHARGE SCRIPT    METHYLPHENIDATE 5 MG ONE TAB PO BID SCHEDULED    HYDROCODONE 5/325 MG ONE TAB PO EVERY 6 HRS. SCHEDULED    TRAMADOL HCI 50 MG ONE TAB PO EVERY 8 HRS PRN

## 2021-06-15 NOTE — PROGRESS NOTES
Nursing Home Discharge Summary      Doe Stacy DO  []  WILEY Gage  []  [address] [facility], [place] [address]  Phone: [phone]  Fax: [phone] Edwin Hunter MD  []  Lasha Flores DO  []  Xiomara Davidson PA-C  [x]  [address] [place] [address]  Phone: [phone]  Fax: [phone]     PATIENT NAME: Roxi Valerio                                                                          YOB: 1975     Age:  45 y.o.  Sex:  female  DATE OF SERVICE: 6/15/2021  Primary Care Physician:  Edwin Hunter MD   Date of Discharge:  06/15/2021  Admission Date:  04/9/2021  FACILITY: [facility]    Discharge Diagnosis:    Encounter Diagnoses   Name Primary?   • Left-sided nontraumatic intracerebral hemorrhage, unspecified cerebral location (CMS/HCC)  Stable, followed closely by neurosurgery.  Continues with right sided weakness and aphasia.   Prophylactic Keppra.      • Metabolic encephalopathy Resolved.      • Acute respiratory failure with hypoxia (CMS/HCC) Resolved.      • Acute deep vein thrombosis (DVT) of left lower extremity, unspecified vein (CMS/HCC) IVC filter in place.  Continues on Lovenox.  To be addressed by neurosurgery at her next follow-up.     • Oropharyngeal dysphagia  tolerating p.o. diet, will need to have G-tube evaluated and removed as clinically indicated.     • Essential hypertension Controlled.  Continue current medications.     • Debility  continue with PT/OT services at home.  Requires significant assistance, although she has had improvement overall.     • Aphasia  stable.     • Major depressive disorder, remission status unspecified, unspecified whether recurrent  improved with Celexa.     • Facial rash  acute on chronic per .  Completed a course of steroids with some improvement.  Consider dermatology referral if persistent.     • Other chronic pain  controlled.  Continue current medications.          Presenting Problem: ICH,  residual right sided weakness.     History of Presenting Illness:  Ms. Valerio is a 46 y/o female with history of COPD, ERIBERTO, HTN, hypothyroidism.  She had left lower extremity DVT on 2/10, subsequently undergoing catheter directed lysis by interventional radiology on 2/11.  On 2/12 she was found to have large left ICH with midline shift.  She required left craniotomy, s/p evacuation on 2/14.  She underwent IVC filter placement.  Course was complicated by left decompressive craniotomy with duraplasty for malignant brain swelling.  He had tracheostomy and PEG to place 2/26.  Stabilized she was transferred to [facility] for further therapy.   Patient tolerated decannulation on 3/29, no additional respiratory symptoms noted.  She continued on tube feeding per g-tube.  Diet was advanced to minced foot and thickened liquids.   requested transfer to facility in [place], to be closer to family, and patient was subsequently transferred to [facility] for continued therapy services.      Patient had continued progress with PT/OT/SLP services.  Tolerated PO diet well, often refusing tube feeding.  Communication improved with use of communication board and pointing systems.  She had increasing right sided upper extremity pain, felt to be due to increased tone.  Work up reassuring, x ray and US negative.  She also had complaint of persistent headaches.  This was an acute on chronic problem, with given history of ICH, CT head obtained and reassuring.  She was started on Tramadol PRN at request of , as she had improvement to symptoms in the past with this medication.  Per nursing, she has had good pain control.  Arrangements were made to follow up with neurosurgery at .  She continues on Lovenox, to be addressed by neurosurgery.  She presents today for discharge to home at request of patient and .  DME requirements facilitated by .  She will continue with PT/OT at home.   Nursing contacted neurosurgery at  prior to discharge, patient is to follow up with them in office x 1 week, Lovenox to be addressed at that time.  Have recommended follow up with PCP within 2 weeks, sooner if needed.  Patient does not currently have PCP and will need to establish care as soon as possible.      Recent Labs:  5/11: Sodium 149, potassium 4.1, BUN 14, creatinine 0.7, AST 13, ALT 21, hemoglobin 12.6, hematocrit 37 point, TSH 0.842, A1c 5.4    REVIEW OF SYSTEMS:    Residual right sided weakness, aphasia, flaking/erythema to face.    All other systems were reviewed and are negative, limited due to aphasia.     PHYSICAL EXAMINATION:     VITAL SIGNS:  /84   Pulse 63   Temp 97.5 °F (36.4 °C)   Resp 14   SpO2 98%     Nursing notes and vital signs reviewed.   General Appearance:  Pleasant, NAD.    Head: S/P craniotomy, scarring noted.    Eyes: PERRLA.  Conjunctivae and sclerae normal.  EOM are within normal limits.    Neck: Normal range of motion. Neck supple. Tracheostomy scar noted. No JVD present.   Cardiovascular: Normal rate, regular rhythm.  Pulses palpable equal bilaterally.    Pulmonary/Chest: Effort normal and breath sounds normal. No respiratory distress.   Abdominal: Soft. Bowel sounds are normal. No distention or tenderness.     Musculoskeletal: Deconditioning noted.  Residual right sided weakness.  No edema.   Neurological: Alert and oriented at baseline.  Aphasic.     Skin: Skin is warm and dry.  Erythema/flaking noted to face.    Psychiatric:  Normal mood and affect. Normal behavior       Condition on Discharge:    Stable to home    Discharge Medication:    Current Outpatient Medications:   •  citalopram (CeleXA) 10 MG tablet, Take 1 tablet by mouth Daily., Disp: , Rfl:   •  dilTIAZem (CARDIZEM) 90 MG tablet, Take 1 tablet by mouth 4 (Four) Times a Day., Disp: , Rfl:   •  enoxaparin (Lovenox) 40 MG/0.4ML solution syringe, Inject 0.4 mL under the skin into the appropriate area as  directed Daily., Disp: 11.2 mL, Rfl:   •  famotidine (Pepcid) 20 MG tablet, Take 1 tablet by mouth Daily., Disp: , Rfl:   •  hydroCHLOROthiazide (MICROZIDE) 12.5 MG capsule, Take 1 capsule by mouth Daily., Disp: , Rfl:   •  HYDROcodone-acetaminophen (Norco) 5-325 MG per tablet, ONE TABLET PO EVERY 6 HOURS, Disp: 120 tablet, Rfl: 0  •  levETIRAcetam (KEPPRA) 100 MG/ML solution, 2.5 mL by Per G Tube route 2 (Two) Times a Day., Disp: , Rfl:   •  losartan (COZAAR) 50 MG tablet, Take 100 mg by mouth Daily., Disp: , Rfl:   •  methylphenidate (Ritalin) 5 MG tablet, Take 1 tablet by mouth 2 (Two) Times a Day., Disp: 60 tablet, Rfl: 0  •  metoprolol tartrate (LOPRESSOR) 100 MG tablet, Take 1 tablet by mouth 2 (Two) Times a Day., Disp: , Rfl:   •  ondansetron (Zofran) 4 MG tablet, Take 1 tablet by mouth Every 8 (Eight) Hours As Needed for Nausea or Vomiting., Disp: , Rfl:   •  traMADol (ULTRAM) 50 MG tablet, Take 1 tablet by mouth Every 8 (Eight) Hours As Needed for Moderate Pain ., Disp: 90 tablet, Rfl: 0     Time: Discharge 25 min    Xiomara Davidson PA-C

## 2021-06-16 VITALS
DIASTOLIC BLOOD PRESSURE: 110 MMHG | SYSTOLIC BLOOD PRESSURE: 146 MMHG | OXYGEN SATURATION: 98 % | HEART RATE: 63 BPM | TEMPERATURE: 97.5 F | RESPIRATION RATE: 14 BRPM

## 2021-06-16 RX ORDER — HYDROCHLOROTHIAZIDE 12.5 MG/1
12.5 CAPSULE, GELATIN COATED ORAL DAILY
Start: 2021-06-16 | End: 2022-11-05 | Stop reason: HOSPADM

## 2021-06-16 RX ORDER — FAMOTIDINE 20 MG/1
20 TABLET, FILM COATED ORAL DAILY
Start: 2021-06-16

## 2021-06-16 RX ORDER — CITALOPRAM 10 MG/1
10 TABLET ORAL DAILY
Start: 2021-06-16

## 2021-06-16 RX ORDER — ONDANSETRON 4 MG/1
4 TABLET, FILM COATED ORAL EVERY 8 HOURS PRN
Status: ON HOLD
Start: 2021-06-16 | End: 2022-11-05 | Stop reason: SDUPTHER

## 2021-06-16 RX ORDER — DILTIAZEM HCL 90 MG
90 TABLET ORAL 4 TIMES DAILY
Start: 2021-06-16 | End: 2022-11-05 | Stop reason: HOSPADM

## 2021-06-16 RX ORDER — METOPROLOL TARTRATE 100 MG/1
100 TABLET ORAL 2 TIMES DAILY
Start: 2021-06-16 | End: 2022-11-05 | Stop reason: HOSPADM

## 2021-06-16 RX ORDER — LEVETIRACETAM 100 MG/ML
250 SOLUTION ORAL 2 TIMES DAILY
Start: 2021-06-16 | End: 2022-11-05 | Stop reason: HOSPADM

## 2021-07-14 NOTE — PROGRESS NOTES
Nursing Home History and Physical        Doeoscar Stacy DO []  WILEY Gage []  [address] [place] [address]  Phone: [phone]  Fax: [phone] Edwin Hunter MD[x]  Lasha Flores DO []   TO[facility] Sar[facility] []    [address] [place] [address]  Phone: [phone]  Fax: [phone]     PATIENT NAME: Roxi Valerio                                                                          YOB: 1975           DATE OF SERVICE: 04/14/2021  FACILITY:   [] [facility] [x] [facility]  [] [facility]    [] [facility]      ______________________________________________________________________    CHIEF COMPLAINT:  Initial visit, status post intracranial bleed      HISTORY OF PRESENT ILLNESS:   Mrs. Valerio is a 46 years old female who had a recent complex clinical course since February 2021 when she was diagnosed with acute deep venous thrombosis, status post lysis with subsequent intracranial bleed requiring decompressive craniotomy.  Meanwhile patient had acute hypoxic respiratory failure requiring intubation and mechanical ventilation followed by tracheostomy placement/decannulated March 29 as well as PEG tube placement.  Patient was transferred from [facility] in [place] for PT, OT and skilled nursing care.  Since transfer, patient remained in stable condition; she is receiving nutrition via PEG.  Despite her aphasia, she is capable of making her wishes known.  Prior to my visit, staff informed me that patient has been complaining of headaches which which are concerning to her and her .  During my visit, patient complained of the same with no reported nausea, vomiting, fever or other acute systemic issues.  She is engaging with PT and OT.  She however is noted to be anxious and frequently tearful.    PAST MEDICAL & SURGICAL HISTORY:   Past Medical History:   Diagnosis Date   • Hypertension       Past Surgical History:   Procedure Laterality Date   • TUBAL  ABDOMINAL LIGATION           MEDICATIONS:  I have reviewed and reconciled the patients medication list in the patients chart at the skilled nursing facility today.      ALLERGIES:  Allergies   Allergen Reactions   • Codeine Nausea And Vomiting   • Penicillins Unknown (See Comments)     As a child          SOCIAL HISTORY:  Social History     Socioeconomic History   • Marital status:      Spouse name: Not on file   • Number of children: Not on file   • Years of education: Not on file   • Highest education level: Not on file   Tobacco Use   • Smoking status: Current Every Day Smoker     Packs/day: 0.50     Types: Cigarettes   Substance and Sexual Activity   • Alcohol use: No   • Drug use: No   • Sexual activity: Defer       FAMILY HISTORY:  No family history on file.    REVIEW OF SYSTEMS:  Review of Systems   Unable to obtain detailed review of systems due to aphasia    PHYSICAL EXAMINATION:   Vital signs: /78, HR 82/min, RR 18/min, temp 97.8 °F, O2 sat 94% on room air  Physical Exam  General Appearance:  Aphasia noted but able to communicate her needs   Head:  Craniotomy site noted no signs of infection   Eyes:          PERRLA, conjunctivae and sclerae normal, no Icterus. No pallor. Extraocular movements are within normal limits.   Throat: No oral lesions, no thrush, oral mucosa moist.   Neck: Supple, trachea midline, no thyromegaly, no carotid bruit.   Lungs:   Chest shape is normal. BS heard bilaterally equally.  No crackles or wheezing. No Pleural rub or bronchial breathing.   Heart:  Normal S1 and S2, no murmur, no gallop, no rub. No JVD.   Abdomen:   Normal BS, no masses, no organomegaly. Soft, nontender, nondistended, no guarding, no rebound tenderness.  G-tube site noted with no signs of infection or dislodgment.   Extremities: No edema, no cyanosis, no clubbing.     Pulses: Pulses palpable and equal bilaterally.   Skin: No bleeding, bruising or rash.     Neurologic: Aphasia noted       RECORDS  REVIEW:   I have reviewed in detail available records including discharge summary and workup    ASSESSMENT:  · Headaches, unspecified etiology   · Intracranial hemorrhage, status post decompressive craniotomy February 14  · Status post tracheostomy/decannulated March 29 and PEG tube placement  · Aphasia secondary to above  · Acute deep venous thrombosis, status post IR guided lysis February 11 with subsequent intracranial hemorrhage  · Status post IVC filter placement  · Chronic essential hypertension  · Mild protein calorie malnutrition  · Chronic depression    PLAN:  · Preadmission medications reviewed, reconciled and reviewed  · And hydrocodone 5/325 mg every 8 hours as needed for pain  · PT and OT as clinically indicated  · Head CT scan without contrast for follow-up on intracranial hemorrhage  · Fall and aspiration precautions   · Routine baseline labs  · Nutritional support recommended and monitored with interdisciplinary support  · Patient is being monitored closely, further plans were modified accordingly  · Follow-up with [facility] neurosurgery as recommended    [x]  Discussed Patient in detail with nursing/staff, addressed all needs today.     [x]  Plan of Care Reviewed   [x]  PT/OT Reviewed     []  Wound assessment and management documentation reviewed    []  Antipsychotic medications and benzodiazepine addressed  []  Order Changes  []  Opioid medications addressed  []  Order Changes  []  Discharge Plans Reviewed   []  Advance Directive on file with Nursing Home.   []  POA on file with Nursing Home.   [x]  Code Status listed on patients chart at the nursing home facility.          Edwin Hunter MD.  4/14/2021

## 2021-07-18 NOTE — PROGRESS NOTES
Nursing Home Progress Note       Doe Stacy DO []  WILEY Gage []  [address] [place] [address]  Phone: [phone]  Fax: [phone] Edwin Hunter MD[x]  Lasha Flores DO []   TOI Saravia []    [address] [place] [address]  Phone: [phone]  Fax: [phone]     PATIENT NAME: Roxi Valerio                                                                          YOB: 1975           DATE OF SERVICE: 6/9/2021  FACILITY:   [] [facility] [x] [facility]  [] [facility]    [] [facility]      ______________________________________________________________________    CHIEF COMPLAINT:  Follow-up visit, status post intracranial bleed      HISTORY OF PRESENT ILLNESS:   Mrs. Valerio is a 46 years old female who had a recent complex clinical course since February 2021 when she was diagnosed with acute deep venous thrombosis, status post lysis with subsequent intracranial bleed requiring decompressive craniotomy.  Meanwhile patient had acute hypoxic respiratory failure requiring intubation and mechanical ventilation followed by tracheostomy placement/decannulated March 29 as well as PEG tube placement.  Patient was transferred from [facility] in [place] for PT, OT and skilled nursing care.  Since transfer, patient remained in stable condition; she is receiving nutrition via PEG.  Despite her aphasia, she is capable of making her wishes known.   As reported by staff and patient, previously reported headaches improved.  Head CT scan done in April was negative for acute findings.  During today's visit, patient was sitting comfortably in her bed, in good spirits and certainly more engaging than prior.    PAST MEDICAL & SURGICAL HISTORY:   Past Medical History:   Diagnosis Date   • Hypertension       Past Surgical History:   Procedure Laterality Date   • TUBAL ABDOMINAL LIGATION           MEDICATIONS:  I have reviewed and reconciled the patients medication list  in the patients chart at the skilled nursing facility today.      ALLERGIES:  Allergies   Allergen Reactions   • Codeine Nausea And Vomiting   • Penicillins Unknown (See Comments)     As a child         REVIEW OF SYSTEMS:  Review of Systems   Unable to obtain detailed review of systems due to aphasia    PHYSICAL EXAMINATION:   Vital signs: /82, HR 82/min, RR 18/min, temp 97.4 °F, O2 sat 95% on room air  Physical Exam  General Appearance:  Aphasia noted but able to communicate her needs; appears more communicative and interactive compared to prior   Head:  Craniotomy site noted no signs of infection   Eyes:          PERRLA, conjunctivae and sclerae normal, no Icterus. No pallor. Extraocular movements are within normal limits.   Throat: No oral lesions, no thrush, oral mucosa moist.   Neck: Supple, trachea midline, no thyromegaly, no carotid bruit.   Lungs:   Chest shape is normal. BS heard bilaterally equally.  No crackles or wheezing. No Pleural rub or bronchial breathing.   Heart:  Normal S1 and S2, no murmur, no gallop, no rub. No JVD.   Abdomen:   Normal BS, no masses, no organomegaly. Soft, nontender, nondistended, no guarding, no rebound tenderness.  G-tube site noted with no signs of infection or dislodgment.   Extremities: No edema, no cyanosis, no clubbing.     Pulses: Pulses palpable and equal bilaterally.   Skin: No bleeding, bruising or rash.     Neurologic: Aphasia noted       RECORDS REVIEW:   I have reviewed in detail available records including discharge summary and workup    ASSESSMENT:  · Intracranial hemorrhage, status post decompressive craniotomy February 14; repeat head CT scan on April 19 revealed left cerebral hemisphere encephalomalacia and was negative for acute findings  · Headaches, unspecified etiology, improved  · Status post tracheostomy/decannulated March 29 and PEG tube placement  · Aphasia secondary to above  · Acute deep venous thrombosis, status post IR guided lysis February  11 with subsequent intracranial hemorrhage  · Status post IVC filter placement  · Chronic essential hypertension  · Mild protein calorie malnutrition  · Chronic depression    PLAN:  · PT and OT as clinically indicated  · Fall and aspiration precautions   · Nutritional support recommended and monitored with interdisciplinary support  · Patient is being monitored closely, further plans were modified accordingly  · Follow-up with [facility] as recommended    [x]  Discussed Patient in detail with nursing/staff, addressed all needs today.     [x]  Plan of Care Reviewed   [x]  PT/OT Reviewed     []  Wound assessment and management documentation reviewed    []  Antipsychotic medications and benzodiazepine addressed  []  Order Changes  []  Opioid medications addressed  []  Order Changes  []  Discharge Plans Reviewed   []  Advance Directive on file with Nursing Home.   []  POA on file with Nursing Home.   [x]  Code Status listed on patients chart at the nursing home facility.          Edwin Hunter MD.  6/9/2021

## 2022-09-14 ENCOUNTER — HOME HEALTH ADMISSION (OUTPATIENT)
Dept: HOME HEALTH SERVICES | Facility: HOME HEALTHCARE | Age: 47
End: 2022-09-14

## 2022-09-14 ENCOUNTER — TRANSCRIBE ORDERS (OUTPATIENT)
Dept: HOME HEALTH SERVICES | Facility: HOME HEALTHCARE | Age: 47
End: 2022-09-14

## 2022-09-14 DIAGNOSIS — R26.9 ABNORMALITY OF GAIT: Primary | ICD-10-CM

## 2022-11-04 ENCOUNTER — HOSPITAL ENCOUNTER (OUTPATIENT)
Facility: HOSPITAL | Age: 47
Setting detail: OBSERVATION
Discharge: HOME | End: 2022-11-05
Attending: EMERGENCY MEDICINE | Admitting: INTERNAL MEDICINE

## 2022-11-04 ENCOUNTER — APPOINTMENT (OUTPATIENT)
Dept: GENERAL RADIOLOGY | Facility: HOSPITAL | Age: 47
End: 2022-11-04

## 2022-11-04 DIAGNOSIS — J10.1 INFLUENZA A: Primary | ICD-10-CM

## 2022-11-04 DIAGNOSIS — J96.01 ACUTE RESPIRATORY FAILURE WITH HYPOXIA: ICD-10-CM

## 2022-11-04 LAB
A-A DO2: 37.9 MMHG
ALBUMIN SERPL-MCNC: 3.6 G/DL (ref 3.5–5.2)
ALBUMIN/GLOB SERPL: 1.1 G/DL
ALP SERPL-CCNC: 82 U/L (ref 39–117)
ALT SERPL W P-5'-P-CCNC: 25 U/L (ref 1–33)
ANION GAP SERPL CALCULATED.3IONS-SCNC: 9.5 MMOL/L (ref 5–15)
ARTERIAL PATENCY WRIST A: ABNORMAL
AST SERPL-CCNC: 31 U/L (ref 1–32)
ATMOSPHERIC PRESS: 735 MMHG
B PARAPERT DNA SPEC QL NAA+PROBE: NOT DETECTED
B PERT DNA SPEC QL NAA+PROBE: NOT DETECTED
BASE EXCESS BLDA CALC-SCNC: -0.5 MMOL/L (ref 0–2)
BASOPHILS # BLD AUTO: 0.03 10*3/MM3 (ref 0–0.2)
BASOPHILS NFR BLD AUTO: 0.3 % (ref 0–1.5)
BDY SITE: ABNORMAL
BILIRUB SERPL-MCNC: 0.2 MG/DL (ref 0–1.2)
BUN SERPL-MCNC: 19 MG/DL (ref 6–20)
BUN/CREAT SERPL: 26 (ref 7–25)
C PNEUM DNA NPH QL NAA+NON-PROBE: NOT DETECTED
CALCIUM SPEC-SCNC: 7.7 MG/DL (ref 8.6–10.5)
CHLORIDE SERPL-SCNC: 101 MMOL/L (ref 98–107)
CO2 SERPL-SCNC: 23.5 MMOL/L (ref 22–29)
COHGB MFR BLD: 3.6 % (ref 0–2)
CREAT SERPL-MCNC: 0.73 MG/DL (ref 0.57–1)
D-LACTATE SERPL-SCNC: 1.5 MMOL/L (ref 0.5–2)
DEPRECATED RDW RBC AUTO: 46.7 FL (ref 37–54)
EGFRCR SERPLBLD CKD-EPI 2021: 101 ML/MIN/1.73
EOSINOPHIL # BLD AUTO: 0.08 10*3/MM3 (ref 0–0.4)
EOSINOPHIL NFR BLD AUTO: 0.9 % (ref 0.3–6.2)
ERYTHROCYTE [DISTWIDTH] IN BLOOD BY AUTOMATED COUNT: 14 % (ref 12.3–15.4)
FLUAV H3 RNA NPH QL NAA+PROBE: DETECTED
FLUBV RNA ISLT QL NAA+PROBE: NOT DETECTED
GLOBULIN UR ELPH-MCNC: 3.3 GM/DL
GLUCOSE SERPL-MCNC: 92 MG/DL (ref 65–99)
HADV DNA SPEC NAA+PROBE: NOT DETECTED
HCO3 BLDA-SCNC: 24.1 MMOL/L (ref 22–28)
HCOV 229E RNA SPEC QL NAA+PROBE: NOT DETECTED
HCOV HKU1 RNA SPEC QL NAA+PROBE: NOT DETECTED
HCOV NL63 RNA SPEC QL NAA+PROBE: NOT DETECTED
HCOV OC43 RNA SPEC QL NAA+PROBE: NOT DETECTED
HCT VFR BLD AUTO: 33.1 % (ref 34–46.6)
HCT VFR BLD CALC: 34.6 %
HGB BLD-MCNC: 11 G/DL (ref 12–15.9)
HMPV RNA NPH QL NAA+NON-PROBE: NOT DETECTED
HOLD SPECIMEN: NORMAL
HPIV1 RNA ISLT QL NAA+PROBE: NOT DETECTED
HPIV2 RNA SPEC QL NAA+PROBE: NOT DETECTED
HPIV3 RNA NPH QL NAA+PROBE: NOT DETECTED
HPIV4 P GENE NPH QL NAA+PROBE: NOT DETECTED
IMM GRANULOCYTES # BLD AUTO: 0.12 10*3/MM3 (ref 0–0.05)
IMM GRANULOCYTES NFR BLD AUTO: 1.4 % (ref 0–0.5)
LYMPHOCYTES # BLD AUTO: 1.18 10*3/MM3 (ref 0.7–3.1)
LYMPHOCYTES NFR BLD AUTO: 13.7 % (ref 19.6–45.3)
Lab: ABNORMAL
M PNEUMO IGG SER IA-ACNC: NOT DETECTED
MCH RBC QN AUTO: 30.2 PG (ref 26.6–33)
MCHC RBC AUTO-ENTMCNC: 33.2 G/DL (ref 31.5–35.7)
MCV RBC AUTO: 90.9 FL (ref 79–97)
METHGB BLD QL: 0.5 % (ref 0–1.5)
MODALITY: ABNORMAL
MONOCYTES # BLD AUTO: 0.87 10*3/MM3 (ref 0.1–0.9)
MONOCYTES NFR BLD AUTO: 10.1 % (ref 5–12)
NEUTROPHILS NFR BLD AUTO: 6.36 10*3/MM3 (ref 1.7–7)
NEUTROPHILS NFR BLD AUTO: 73.6 % (ref 42.7–76)
NOTE: ABNORMAL
NOTIFIED BY: ABNORMAL
NOTIFIED WHO: ABNORMAL
NRBC BLD AUTO-RTO: 0 /100 WBC (ref 0–0.2)
OXYHGB MFR BLDV: 89.4 % (ref 94–99)
PCO2 BLDA: 38.3 MM HG (ref 35–45)
PCO2 TEMP ADJ BLD: ABNORMAL MM[HG]
PH BLDA: 7.41 PH UNITS (ref 7.35–7.45)
PH, TEMP CORRECTED: ABNORMAL
PLATELET # BLD AUTO: 193 10*3/MM3 (ref 140–450)
PMV BLD AUTO: 11.1 FL (ref 6–12)
PO2 BLDA: 63.4 MM HG (ref 75–100)
PO2 TEMP ADJ BLD: ABNORMAL MM[HG]
POTASSIUM SERPL-SCNC: 5.3 MMOL/L (ref 3.5–5.2)
PROCALCITONIN SERPL-MCNC: 0.04 NG/ML (ref 0–0.25)
PROT SERPL-MCNC: 6.9 G/DL (ref 6–8.5)
RBC # BLD AUTO: 3.64 10*6/MM3 (ref 3.77–5.28)
RHINOVIRUS RNA SPEC NAA+PROBE: NOT DETECTED
RSV RNA NPH QL NAA+NON-PROBE: NOT DETECTED
SAO2 % BLDCOA: 93.2 % (ref 94–100)
SARS-COV-2 RNA NPH QL NAA+NON-PROBE: NOT DETECTED
SODIUM SERPL-SCNC: 134 MMOL/L (ref 136–145)
VENTILATOR MODE: ABNORMAL
WBC NRBC COR # BLD: 8.64 10*3/MM3 (ref 3.4–10.8)
WHOLE BLOOD HOLD COAG: NORMAL
WHOLE BLOOD HOLD SPECIMEN: NORMAL

## 2022-11-04 PROCEDURE — 99220 PR INITIAL OBSERVATION CARE/DAY 70 MINUTES: CPT | Performed by: STUDENT IN AN ORGANIZED HEALTH CARE EDUCATION/TRAINING PROGRAM

## 2022-11-04 PROCEDURE — 93005 ELECTROCARDIOGRAM TRACING: CPT | Performed by: EMERGENCY MEDICINE

## 2022-11-04 PROCEDURE — 71045 X-RAY EXAM CHEST 1 VIEW: CPT

## 2022-11-04 PROCEDURE — 82805 BLOOD GASES W/O2 SATURATION: CPT

## 2022-11-04 PROCEDURE — 36415 COLL VENOUS BLD VENIPUNCTURE: CPT

## 2022-11-04 PROCEDURE — 84145 PROCALCITONIN (PCT): CPT | Performed by: EMERGENCY MEDICINE

## 2022-11-04 PROCEDURE — 85025 COMPLETE CBC W/AUTO DIFF WBC: CPT | Performed by: EMERGENCY MEDICINE

## 2022-11-04 PROCEDURE — 96365 THER/PROPH/DIAG IV INF INIT: CPT

## 2022-11-04 PROCEDURE — 99285 EMERGENCY DEPT VISIT HI MDM: CPT

## 2022-11-04 PROCEDURE — 25010000002 METHYLPREDNISOLONE PER 125 MG: Performed by: EMERGENCY MEDICINE

## 2022-11-04 PROCEDURE — 94640 AIRWAY INHALATION TREATMENT: CPT

## 2022-11-04 PROCEDURE — 80053 COMPREHEN METABOLIC PANEL: CPT | Performed by: EMERGENCY MEDICINE

## 2022-11-04 PROCEDURE — 87040 BLOOD CULTURE FOR BACTERIA: CPT | Performed by: EMERGENCY MEDICINE

## 2022-11-04 PROCEDURE — 0202U NFCT DS 22 TRGT SARS-COV-2: CPT | Performed by: EMERGENCY MEDICINE

## 2022-11-04 PROCEDURE — 96375 TX/PRO/DX INJ NEW DRUG ADDON: CPT

## 2022-11-04 PROCEDURE — 82375 ASSAY CARBOXYHB QUANT: CPT

## 2022-11-04 PROCEDURE — G0378 HOSPITAL OBSERVATION PER HR: HCPCS

## 2022-11-04 PROCEDURE — 25010000002 CEFTRIAXONE SODIUM-DEXTROSE 2-2.22 GM-%(50ML) RECONSTITUTED SOLUTION: Performed by: EMERGENCY MEDICINE

## 2022-11-04 PROCEDURE — 83605 ASSAY OF LACTIC ACID: CPT | Performed by: EMERGENCY MEDICINE

## 2022-11-04 PROCEDURE — 83050 HGB METHEMOGLOBIN QUAN: CPT

## 2022-11-04 RX ORDER — METHYLPREDNISOLONE SODIUM SUCCINATE 125 MG/2ML
125 INJECTION, POWDER, LYOPHILIZED, FOR SOLUTION INTRAMUSCULAR; INTRAVENOUS ONCE
Status: COMPLETED | OUTPATIENT
Start: 2022-11-04 | End: 2022-11-04

## 2022-11-04 RX ORDER — ACETAMINOPHEN 500 MG
1000 TABLET ORAL ONCE
Status: COMPLETED | OUTPATIENT
Start: 2022-11-04 | End: 2022-11-04

## 2022-11-04 RX ORDER — SODIUM CHLORIDE 0.9 % (FLUSH) 0.9 %
10 SYRINGE (ML) INJECTION AS NEEDED
Status: DISCONTINUED | OUTPATIENT
Start: 2022-11-04 | End: 2022-11-05 | Stop reason: HOSPADM

## 2022-11-04 RX ORDER — CEFTRIAXONE 2 G/50ML
2 INJECTION, SOLUTION INTRAVENOUS ONCE
Status: COMPLETED | OUTPATIENT
Start: 2022-11-04 | End: 2022-11-04

## 2022-11-04 RX ORDER — OSELTAMIVIR PHOSPHATE 75 MG/1
75 CAPSULE ORAL ONCE
Status: COMPLETED | OUTPATIENT
Start: 2022-11-04 | End: 2022-11-04

## 2022-11-04 RX ORDER — IPRATROPIUM BROMIDE AND ALBUTEROL SULFATE 2.5; .5 MG/3ML; MG/3ML
3 SOLUTION RESPIRATORY (INHALATION) ONCE
Status: COMPLETED | OUTPATIENT
Start: 2022-11-04 | End: 2022-11-04

## 2022-11-04 RX ADMIN — SODIUM CHLORIDE 2520 ML: 9 INJECTION, SOLUTION INTRAVENOUS at 21:27

## 2022-11-04 RX ADMIN — IPRATROPIUM BROMIDE AND ALBUTEROL SULFATE 3 ML: 2.5; .5 SOLUTION RESPIRATORY (INHALATION) at 22:08

## 2022-11-04 RX ADMIN — CEFTRIAXONE 2 G: 2 INJECTION, SOLUTION INTRAVENOUS at 21:27

## 2022-11-04 RX ADMIN — OSELTAMIVIR PHOSPHATE 75 MG: 75 CAPSULE ORAL at 22:53

## 2022-11-04 RX ADMIN — METHYLPREDNISOLONE SODIUM SUCCINATE 125 MG: 125 INJECTION, POWDER, FOR SOLUTION INTRAMUSCULAR; INTRAVENOUS at 21:27

## 2022-11-04 RX ADMIN — ACETAMINOPHEN 1000 MG: 500 TABLET, FILM COATED ORAL at 22:53

## 2022-11-05 ENCOUNTER — READMISSION MANAGEMENT (OUTPATIENT)
Dept: CALL CENTER | Facility: HOSPITAL | Age: 47
End: 2022-11-05

## 2022-11-05 VITALS
WEIGHT: 218.92 LBS | DIASTOLIC BLOOD PRESSURE: 71 MMHG | SYSTOLIC BLOOD PRESSURE: 126 MMHG | OXYGEN SATURATION: 97 % | TEMPERATURE: 97.5 F | RESPIRATION RATE: 18 BRPM | HEART RATE: 77 BPM | HEIGHT: 63 IN | BODY MASS INDEX: 38.79 KG/M2

## 2022-11-05 PROBLEM — J10.1 INFLUENZA A: Status: ACTIVE | Noted: 2022-11-05

## 2022-11-05 LAB
ANION GAP SERPL CALCULATED.3IONS-SCNC: 10.3 MMOL/L (ref 5–15)
BASOPHILS # BLD AUTO: 0.01 10*3/MM3 (ref 0–0.2)
BASOPHILS NFR BLD AUTO: 0.1 % (ref 0–1.5)
BUN SERPL-MCNC: 26 MG/DL (ref 6–20)
BUN/CREAT SERPL: 28.9 (ref 7–25)
CALCIUM SPEC-SCNC: 8 MG/DL (ref 8.6–10.5)
CHLORIDE SERPL-SCNC: 105 MMOL/L (ref 98–107)
CO2 SERPL-SCNC: 20.7 MMOL/L (ref 22–29)
CREAT SERPL-MCNC: 0.9 MG/DL (ref 0.57–1)
DEPRECATED RDW RBC AUTO: 46.6 FL (ref 37–54)
EGFRCR SERPLBLD CKD-EPI 2021: 78.5 ML/MIN/1.73
EOSINOPHIL # BLD AUTO: 0.01 10*3/MM3 (ref 0–0.4)
EOSINOPHIL NFR BLD AUTO: 0.1 % (ref 0.3–6.2)
ERYTHROCYTE [DISTWIDTH] IN BLOOD BY AUTOMATED COUNT: 13.9 % (ref 12.3–15.4)
GLUCOSE SERPL-MCNC: 167 MG/DL (ref 65–99)
HCT VFR BLD AUTO: 32.7 % (ref 34–46.6)
HGB BLD-MCNC: 10.5 G/DL (ref 12–15.9)
IMM GRANULOCYTES # BLD AUTO: 0.05 10*3/MM3 (ref 0–0.05)
IMM GRANULOCYTES NFR BLD AUTO: 0.5 % (ref 0–0.5)
LYMPHOCYTES # BLD AUTO: 0.73 10*3/MM3 (ref 0.7–3.1)
LYMPHOCYTES NFR BLD AUTO: 7.3 % (ref 19.6–45.3)
MCH RBC QN AUTO: 29.5 PG (ref 26.6–33)
MCHC RBC AUTO-ENTMCNC: 32.1 G/DL (ref 31.5–35.7)
MCV RBC AUTO: 91.9 FL (ref 79–97)
MONOCYTES # BLD AUTO: 0.41 10*3/MM3 (ref 0.1–0.9)
MONOCYTES NFR BLD AUTO: 4.1 % (ref 5–12)
NEUTROPHILS NFR BLD AUTO: 8.77 10*3/MM3 (ref 1.7–7)
NEUTROPHILS NFR BLD AUTO: 87.9 % (ref 42.7–76)
NRBC BLD AUTO-RTO: 0 /100 WBC (ref 0–0.2)
PLATELET # BLD AUTO: 192 10*3/MM3 (ref 140–450)
PMV BLD AUTO: 11.1 FL (ref 6–12)
POTASSIUM SERPL-SCNC: 5.2 MMOL/L (ref 3.5–5.2)
RBC # BLD AUTO: 3.56 10*6/MM3 (ref 3.77–5.28)
SODIUM SERPL-SCNC: 136 MMOL/L (ref 136–145)
WBC NRBC COR # BLD: 9.98 10*3/MM3 (ref 3.4–10.8)

## 2022-11-05 PROCEDURE — 85025 COMPLETE CBC W/AUTO DIFF WBC: CPT | Performed by: STUDENT IN AN ORGANIZED HEALTH CARE EDUCATION/TRAINING PROGRAM

## 2022-11-05 PROCEDURE — 96372 THER/PROPH/DIAG INJ SC/IM: CPT

## 2022-11-05 PROCEDURE — 96376 TX/PRO/DX INJ SAME DRUG ADON: CPT

## 2022-11-05 PROCEDURE — 25010000002 METHYLPREDNISOLONE PER 40 MG: Performed by: STUDENT IN AN ORGANIZED HEALTH CARE EDUCATION/TRAINING PROGRAM

## 2022-11-05 PROCEDURE — G0378 HOSPITAL OBSERVATION PER HR: HCPCS

## 2022-11-05 PROCEDURE — 25010000002 ENOXAPARIN PER 10 MG: Performed by: STUDENT IN AN ORGANIZED HEALTH CARE EDUCATION/TRAINING PROGRAM

## 2022-11-05 PROCEDURE — 80048 BASIC METABOLIC PNL TOTAL CA: CPT | Performed by: STUDENT IN AN ORGANIZED HEALTH CARE EDUCATION/TRAINING PROGRAM

## 2022-11-05 PROCEDURE — 99217 PR OBSERVATION CARE DISCHARGE MANAGEMENT: CPT | Performed by: INTERNAL MEDICINE

## 2022-11-05 RX ORDER — IPRATROPIUM BROMIDE AND ALBUTEROL SULFATE 2.5; .5 MG/3ML; MG/3ML
3 SOLUTION RESPIRATORY (INHALATION) EVERY 4 HOURS PRN
Status: DISCONTINUED | OUTPATIENT
Start: 2022-11-05 | End: 2022-11-05 | Stop reason: HOSPADM

## 2022-11-05 RX ORDER — ACETAMINOPHEN 650 MG/1
650 SUPPOSITORY RECTAL EVERY 4 HOURS PRN
Status: DISCONTINUED | OUTPATIENT
Start: 2022-11-05 | End: 2022-11-05 | Stop reason: HOSPADM

## 2022-11-05 RX ORDER — ONDANSETRON 4 MG/1
4 TABLET, FILM COATED ORAL EVERY 8 HOURS PRN
Qty: 20 TABLET | Refills: 0 | Status: SHIPPED | OUTPATIENT
Start: 2022-11-05 | End: 2022-11-25

## 2022-11-05 RX ORDER — BUTALBITAL, ACETAMINOPHEN AND CAFFEINE 50; 325; 40 MG/1; MG/1; MG/1
1 TABLET ORAL 2 TIMES DAILY
COMMUNITY

## 2022-11-05 RX ORDER — LOSARTAN POTASSIUM 25 MG/1
25 TABLET ORAL DAILY
COMMUNITY

## 2022-11-05 RX ORDER — SODIUM CHLORIDE 0.9 % (FLUSH) 0.9 %
10 SYRINGE (ML) INJECTION EVERY 12 HOURS SCHEDULED
Status: DISCONTINUED | OUTPATIENT
Start: 2022-11-05 | End: 2022-11-05 | Stop reason: HOSPADM

## 2022-11-05 RX ORDER — DILTIAZEM HYDROCHLORIDE 240 MG/1
240 CAPSULE, COATED, EXTENDED RELEASE ORAL DAILY
Qty: 30 CAPSULE | Refills: 0
Start: 2022-11-05 | End: 2022-12-05

## 2022-11-05 RX ORDER — AMOXICILLIN 250 MG
1 CAPSULE ORAL DAILY
COMMUNITY

## 2022-11-05 RX ORDER — ACETAMINOPHEN 160 MG/5ML
650 SOLUTION ORAL EVERY 4 HOURS PRN
Status: DISCONTINUED | OUTPATIENT
Start: 2022-11-05 | End: 2022-11-05 | Stop reason: HOSPADM

## 2022-11-05 RX ORDER — FAMOTIDINE 20 MG/1
20 TABLET, FILM COATED ORAL DAILY
Status: DISCONTINUED | OUTPATIENT
Start: 2022-11-05 | End: 2022-11-05 | Stop reason: HOSPADM

## 2022-11-05 RX ORDER — DIVALPROEX SODIUM 250 MG/1
500 TABLET, DELAYED RELEASE ORAL 2 TIMES DAILY
Status: DISCONTINUED | OUTPATIENT
Start: 2022-11-05 | End: 2022-11-05 | Stop reason: HOSPADM

## 2022-11-05 RX ORDER — LEVETIRACETAM 500 MG/1
500 TABLET ORAL 2 TIMES DAILY
COMMUNITY

## 2022-11-05 RX ORDER — HYDROCHLOROTHIAZIDE 12.5 MG/1
12.5 CAPSULE, GELATIN COATED ORAL DAILY
Status: DISCONTINUED | OUTPATIENT
Start: 2022-11-05 | End: 2022-11-05 | Stop reason: HOSPADM

## 2022-11-05 RX ORDER — CITALOPRAM 20 MG/1
20 TABLET ORAL DAILY
Status: DISCONTINUED | OUTPATIENT
Start: 2022-11-05 | End: 2022-11-05 | Stop reason: HOSPADM

## 2022-11-05 RX ORDER — OSELTAMIVIR PHOSPHATE 75 MG/1
75 CAPSULE ORAL EVERY 12 HOURS SCHEDULED
Status: DISCONTINUED | OUTPATIENT
Start: 2022-11-05 | End: 2022-11-05

## 2022-11-05 RX ORDER — ASPIRIN 81 MG/1
81 TABLET, CHEWABLE ORAL DAILY
Status: DISCONTINUED | OUTPATIENT
Start: 2022-11-05 | End: 2022-11-05 | Stop reason: HOSPADM

## 2022-11-05 RX ORDER — ACETAMINOPHEN 325 MG/1
650 TABLET ORAL EVERY 4 HOURS PRN
Status: DISCONTINUED | OUTPATIENT
Start: 2022-11-05 | End: 2022-11-05 | Stop reason: HOSPADM

## 2022-11-05 RX ORDER — METOPROLOL TARTRATE 50 MG/1
50 TABLET, FILM COATED ORAL DAILY
Status: DISCONTINUED | OUTPATIENT
Start: 2022-11-05 | End: 2022-11-05 | Stop reason: HOSPADM

## 2022-11-05 RX ORDER — METOPROLOL SUCCINATE 50 MG/1
50 TABLET, EXTENDED RELEASE ORAL DAILY
Qty: 30 TABLET | Refills: 0
Start: 2022-11-05 | End: 2022-12-05

## 2022-11-05 RX ORDER — PREGABALIN 75 MG/1
300 CAPSULE ORAL 2 TIMES DAILY
Status: DISCONTINUED | OUTPATIENT
Start: 2022-11-05 | End: 2022-11-05 | Stop reason: HOSPADM

## 2022-11-05 RX ORDER — LEVETIRACETAM 500 MG/1
500 TABLET ORAL 2 TIMES DAILY
Status: DISCONTINUED | OUTPATIENT
Start: 2022-11-05 | End: 2022-11-05 | Stop reason: HOSPADM

## 2022-11-05 RX ORDER — AMITRIPTYLINE HYDROCHLORIDE 50 MG/1
50 TABLET, FILM COATED ORAL NIGHTLY
Status: DISCONTINUED | OUTPATIENT
Start: 2022-11-05 | End: 2022-11-05 | Stop reason: HOSPADM

## 2022-11-05 RX ORDER — OSELTAMIVIR PHOSPHATE 75 MG/1
75 CAPSULE ORAL EVERY 12 HOURS SCHEDULED
Qty: 8 CAPSULE | Refills: 0 | Status: SHIPPED | OUTPATIENT
Start: 2022-11-05 | End: 2022-11-09

## 2022-11-05 RX ORDER — ONDANSETRON 4 MG/1
4 TABLET, FILM COATED ORAL EVERY 8 HOURS PRN
Qty: 20 TABLET | Refills: 0
Start: 2022-11-05 | End: 2022-11-05 | Stop reason: SDUPTHER

## 2022-11-05 RX ORDER — METHYLPREDNISOLONE SODIUM SUCCINATE 40 MG/ML
40 INJECTION, POWDER, LYOPHILIZED, FOR SOLUTION INTRAMUSCULAR; INTRAVENOUS EVERY 12 HOURS
Status: DISCONTINUED | OUTPATIENT
Start: 2022-11-05 | End: 2022-11-05 | Stop reason: HOSPADM

## 2022-11-05 RX ORDER — ASPIRIN 81 MG/1
81 TABLET, CHEWABLE ORAL DAILY
COMMUNITY

## 2022-11-05 RX ORDER — ENOXAPARIN SODIUM 100 MG/ML
40 INJECTION SUBCUTANEOUS DAILY
Status: DISCONTINUED | OUTPATIENT
Start: 2022-11-05 | End: 2022-11-05 | Stop reason: HOSPADM

## 2022-11-05 RX ORDER — OSELTAMIVIR PHOSPHATE 75 MG/1
75 CAPSULE ORAL EVERY 12 HOURS SCHEDULED
Qty: 8 CAPSULE | Refills: 0 | Status: SHIPPED | OUTPATIENT
Start: 2022-11-05 | End: 2022-11-05 | Stop reason: SDUPTHER

## 2022-11-05 RX ORDER — ACETAMINOPHEN 325 MG/1
650 TABLET ORAL EVERY 6 HOURS PRN
COMMUNITY

## 2022-11-05 RX ORDER — DILTIAZEM HYDROCHLORIDE 60 MG/1
240 TABLET, FILM COATED ORAL DAILY
Status: DISCONTINUED | OUTPATIENT
Start: 2022-11-05 | End: 2022-11-05 | Stop reason: HOSPADM

## 2022-11-05 RX ORDER — ALBUTEROL SULFATE 2.5 MG/3ML
2.5 SOLUTION RESPIRATORY (INHALATION) EVERY 6 HOURS PRN
Status: DISCONTINUED | OUTPATIENT
Start: 2022-11-05 | End: 2022-11-05 | Stop reason: HOSPADM

## 2022-11-05 RX ORDER — LOSARTAN POTASSIUM 25 MG/1
25 TABLET ORAL DAILY
Status: DISCONTINUED | OUTPATIENT
Start: 2022-11-05 | End: 2022-11-05 | Stop reason: HOSPADM

## 2022-11-05 RX ORDER — MAGNESIUM OXIDE 400 MG/1
400 TABLET ORAL NIGHTLY
COMMUNITY

## 2022-11-05 RX ORDER — AMITRIPTYLINE HYDROCHLORIDE 50 MG/1
50 TABLET, FILM COATED ORAL NIGHTLY
COMMUNITY

## 2022-11-05 RX ORDER — OSELTAMIVIR PHOSPHATE 75 MG/1
75 CAPSULE ORAL EVERY 12 HOURS SCHEDULED
Status: DISCONTINUED | OUTPATIENT
Start: 2022-11-05 | End: 2022-11-05 | Stop reason: HOSPADM

## 2022-11-05 RX ORDER — SODIUM CHLORIDE 0.9 % (FLUSH) 0.9 %
10 SYRINGE (ML) INJECTION AS NEEDED
Status: DISCONTINUED | OUTPATIENT
Start: 2022-11-05 | End: 2022-11-05 | Stop reason: HOSPADM

## 2022-11-05 RX ORDER — ONDANSETRON 2 MG/ML
4 INJECTION INTRAMUSCULAR; INTRAVENOUS EVERY 6 HOURS PRN
Status: DISCONTINUED | OUTPATIENT
Start: 2022-11-05 | End: 2022-11-05 | Stop reason: HOSPADM

## 2022-11-05 RX ORDER — DIVALPROEX SODIUM 500 MG/1
500 TABLET, DELAYED RELEASE ORAL 2 TIMES DAILY
COMMUNITY

## 2022-11-05 RX ORDER — PREGABALIN 100 MG/1
300 CAPSULE ORAL 2 TIMES DAILY
COMMUNITY

## 2022-11-05 RX ADMIN — HYDROCHLOROTHIAZIDE 12.5 MG: 12.5 CAPSULE ORAL at 09:36

## 2022-11-05 RX ADMIN — PREGABALIN 300 MG: 75 CAPSULE ORAL at 09:35

## 2022-11-05 RX ADMIN — METOPROLOL TARTRATE 50 MG: 50 TABLET ORAL at 09:35

## 2022-11-05 RX ADMIN — DILTIAZEM HYDROCHLORIDE 240 MG: 60 TABLET, FILM COATED ORAL at 09:35

## 2022-11-05 RX ADMIN — LOSARTAN POTASSIUM 25 MG: 25 TABLET, FILM COATED ORAL at 09:35

## 2022-11-05 RX ADMIN — FAMOTIDINE 20 MG: 20 TABLET ORAL at 09:35

## 2022-11-05 RX ADMIN — ENOXAPARIN SODIUM 40 MG: 100 INJECTION SUBCUTANEOUS at 04:15

## 2022-11-05 RX ADMIN — Medication 10 ML: at 09:35

## 2022-11-05 RX ADMIN — LEVETIRACETAM 500 MG: 500 TABLET, FILM COATED ORAL at 09:35

## 2022-11-05 RX ADMIN — DIVALPROEX SODIUM 500 MG: 250 TABLET, DELAYED RELEASE ORAL at 09:35

## 2022-11-05 RX ADMIN — OSELTAMIVIR PHOSPHATE 75 MG: 75 CAPSULE ORAL at 04:16

## 2022-11-05 RX ADMIN — CITALOPRAM HYDROBROMIDE 20 MG: 20 TABLET ORAL at 09:35

## 2022-11-05 RX ADMIN — ASPIRIN 81 MG CHEWABLE TABLET 81 MG: 81 TABLET CHEWABLE at 09:34

## 2022-11-05 RX ADMIN — METHYLPREDNISOLONE SODIUM SUCCINATE 40 MG: 40 INJECTION, POWDER, FOR SOLUTION INTRAMUSCULAR; INTRAVENOUS at 09:36

## 2022-11-05 RX ADMIN — Medication 10 ML: at 02:00

## 2022-11-05 NOTE — CASE MANAGEMENT/SOCIAL WORK
Case Management Discharge Note    Pt discharged home with spouse to provide transportation            Selected Continued Care - Discharged on 11/5/2022 Admission date: 11/4/2022 - Discharge disposition: Home or Self Care            Transportation Services  Private: Car    Final Discharge Disposition Code: 01 - home or self-care

## 2022-11-05 NOTE — DISCHARGE SUMMARY
"    [facility]      Name:  Roxi Valerio   Age:  47 y.o.  Sex:  female  :  1975  MRN:  [id number]   Visit Number:  [id number]    Admission Date:  2022  Date of Discharge:  2022  Primary Care Physician:  Edwin Hunter MD    Important issues to note:    Patient came with borderline hypoxia due to influenza which resolved rapidly was able to discharge home the next day    Discharge Diagnoses:        Acute respiratory failure with hypoxia  Influenza A infection  COPD exacerbation, suspected     history of right-sided paralysis from stroke  dysarthria  COPD  hypertension  Tobacco use disorder  history brain surgery  on Keppra        Problem List:     Active Hospital Problems    Diagnosis  POA   • **Influenza A [J10.1]  Yes      Resolved Hospital Problems   No resolved problems to display.     Presenting Problem:    Chief Complaint   Patient presents with   • Fever      Consults:     Consulting Physician(s)             None          Procedures Performed:        History of presenting illness/Hospital Course:    Patient is a 47-year-old woman with past medical history of right-sided paralysis from stroke, dysarthria, COPD, tobacco use disorder, hypertension, history brain surgery, on Kera.  Presented to [facility] ED on 2022,  had concern for patient \"feeling hot\" found to have a fever of 103.   noted no other issues or changes recently.  She has had no cough, vomiting, diarrhea, or concerning pain.  Patient denied any pain at time of admission.  Able to shake head yes and no appropriately, has dysarthria at baseline and sometimes unintelligible speech.     ED summary: Presented with fever of 100.8, heart rate 126, requiring 2 L nasal cannula as a new requirement.  EKG sinus tachycardia no ST elevations or depressions.  ABG only showed some hypoxia.  Potassium 5.3.  Lactate normal, procalcitonin normal.  CBC unremarkable.  Blood cultures " collected.  Viral respiratory panel positive for flu A.  CXR no focal consolidation, radiology interpretation pending.  She was provided Rocephin, Tylenol, DuoNeb, Solu-Medrol 125 mg IV, Tamiflu, normal saline bolus 2520 mL.     Acute respiratory failure with hypoxia  Influenza A infection  COPD exacerbation, suspected  -2 L nasal cannula required upon admission early a.m. 11/5, wean as tolerated.  No previous home O2 requirement.  Update 11/5/2022 patient able to wean to room air without difficulty  -Tamiflu for 5 days.  Prescription provided  -DuoNeb as needed.  Has inhalers at home  -Solu-Medrol IV.  No steroids provided for home        history of right-sided paralysis from stroke  dysarthria  COPD  hypertension  Tobacco use disorder  history brain surgery  on Keppra    Vital Signs:    Temp:  [97.5 °F (36.4 °C)-100.8 °F (38.2 °C)] 97.5 °F (36.4 °C)  Heart Rate:  [] 77  Resp:  [18-24] 18  BP: (105-126)/() 126/71    Physical Exam:    General Appearance:  Alert and cooperative.    Head:  Atraumatic and normocephalic.   Eyes: Conjunctivae and sclerae normal, no icterus. No pallor.   Ears:  Ears with no abnormalities noted.   Throat: No oral lesions, no thrush, oral mucosa moist.   Neck: Supple, trachea midline, no thyromegaly.   Back:   No kyphoscoliosis present. No tenderness to palpation.   Lungs:   Breath sounds heard bilaterally equally.  No crackles or wheezing. No Pleural rub or bronchial breathing.   Heart:  Normal S1 and S2, no murmur, no gallop, no rub. No JVD.   Abdomen:   Normal bowel sounds, no masses, no organomegaly. Soft, nontender, nondistended, no rebound tenderness.   Extremities: Supple, no edema, no cyanosis, no clubbing.   Pulses: Pulses palpable bilaterally.   Skin: No bleeding or rash.   Neurologic: Alert. No tremors.     Pertinent Lab Results:     Results from last 7 days   Lab Units 11/05/22  0617 11/04/22 2038   SODIUM mmol/L 136 134*   POTASSIUM mmol/L 5.2 5.3*   CHLORIDE  mmol/L 105 101   CO2 mmol/L 20.7* 23.5   BUN mg/dL 26* 19   CREATININE mg/dL 0.90 0.73   CALCIUM mg/dL 8.0* 7.7*   BILIRUBIN mg/dL  --  0.2   ALK PHOS U/L  --  82   ALT (SGPT) U/L  --  25   AST (SGOT) U/L  --  31   GLUCOSE mg/dL 167* 92     Results from last 7 days   Lab Units 11/05/22  0617 11/04/22  2038   WBC 10*3/mm3 9.98 8.64   HEMOGLOBIN g/dL 10.5* 11.0*   HEMATOCRIT % 32.7* 33.1*   PLATELETS 10*3/mm3 192 193                         Results from last 7 days   Lab Units 11/04/22  2219   PH, ARTERIAL pH units 7.407   PO2 ART mm Hg 63.4*   PCO2, ARTERIAL mm Hg 38.3   HCO3 ART mmol/L 24.1           Pertinent Radiology Results:    Imaging Results (All)     Procedure Component Value Units Date/Time    XR Chest 1 View [[id number]] Collected: 11/05/22 0736     Updated: 11/05/22 0738    Narrative:      PORTABLE CHEST  11/4/2022 9:23 PM     HISTORY: Shortness of breath     COMPARISON:   None     FINDINGS: The cardiac silhouette is normal in size. The mediastinal and  hilar contours are unremarkable. Hypoinflated lungs. No focal  consolidation. There is no pneumothorax. The visualized osseous  structures demonstrate no acute abnormalities.       Impression:      No acute cardiopulmonary process.                 This report was signed and finalized on 11/5/2022 7:36 AM by Quinn Dangelo MD.          Echo:      Condition on Discharge:      Stable.    Code status during the hospital stay:    Code Status and Medical Interventions:   Ordered at: 11/05/22 0257     Code Status (Patient has no pulse and is not breathing):    CPR (Attempt to Resuscitate)     Medical Interventions (Patient has pulse or is breathing):    Full Support     Discharge Disposition:    Home or Self Care    Discharge Medications:       Discharge Medications      New Medications      Instructions Start Date   dilTIAZem  MG 24 hr capsule  Commonly known as: Cartia XT   240 mg, Oral, Daily      metoprolol succinate XL 50 MG 24 hr tablet  Commonly  known as: Toprol XL   50 mg, Oral, Daily      oseltamivir 75 MG capsule  Commonly known as: TAMIFLU   75 mg, Oral, Every 12 Hours Scheduled         Changes to Medications      Instructions Start Date   citalopram 10 MG tablet  Commonly known as: CeleXA  What changed: how much to take   10 mg, Oral, Daily      levETIRAcetam 500 MG tablet  Commonly known as: KEPPRA  What changed: Another medication with the same name was removed. Continue taking this medication, and follow the directions you see here.   500 mg, Oral, 2 Times Daily      losartan 25 MG tablet  Commonly known as: COZAAR  What changed: Another medication with the same name was removed. Continue taking this medication, and follow the directions you see here.   25 mg, Oral, Daily         Continue These Medications      Instructions Start Date   acetaminophen 325 MG tablet  Commonly known as: TYLENOL   650 mg, Oral, Every 6 Hours PRN      Albuterol Sulfate (sensor) 108 (90 Base) MCG/ACT aerosol powder    2 puffs, Inhalation, Every 6 Hours PRN, For wheezing      amitriptyline 50 MG tablet  Commonly known as: ELAVIL   50 mg, Oral, Nightly      aspirin 81 MG chewable tablet   81 mg, Oral, Daily      divalproex 500 MG DR tablet  Commonly known as: DEPAKOTE   500 mg, Oral, 2 Times Daily      Esgic -40 MG per tablet  Generic drug: butalbital-acetaminophen-caffeine   1 tablet, Oral, 2 Times Daily      famotidine 20 MG tablet  Commonly known as: Pepcid   20 mg, Oral, Daily      magnesium oxide 400 MG tablet  Commonly known as: MAG-OX   400 mg, Oral, Nightly      ondansetron 4 MG tablet  Commonly known as: Zofran   4 mg, Oral, Every 8 Hours PRN      pregabalin 100 MG capsule  Commonly known as: LYRICA   300 mg, Oral, 2 Times Daily      sennosides-docusate 8.6-50 MG per tablet  Commonly known as: PERICOLACE   1 tablet, Oral, Daily      tiotropium 18 MCG per inhalation capsule  Commonly known as: SPIRIVA   1 capsule, Inhalation, Nightly      traMADol 50 MG  tablet  Commonly known as: ULTRAM   50 mg, Oral, Every 8 Hours PRN         Stop These Medications    dilTIAZem 90 MG tablet  Commonly known as: CARDIZEM     enoxaparin 40 MG/0.4ML solution syringe  Commonly known as: Lovenox     hydroCHLOROthiazide 12.5 MG capsule  Commonly known as: MICROZIDE     HYDROcodone-acetaminophen 5-325 MG per tablet  Commonly known as: Norco     methylphenidate 5 MG tablet  Commonly known as: Ritalin     metoprolol tartrate 100 MG tablet  Commonly known as: LOPRESSOR          Discharge Diet:       Activity at Discharge:       Follow-up Appointments:     Follow-up Information     Edwin Hunter MD .    Specialties: Internal Medicine, Hospice and Palliative Medicine  Contact information:  [address]  [facility] [place] [address] [phone]             Xiomara Davidson PA-C .    Specialties: Physician Assistant, Internal Medicine  Contact information:  [address]  [place] [place] [address] [phone]                       No future appointments.  Test Results Pending at Discharge:    Pending Labs     Order Current Status    Blood Culture - Blood, Hand, Left In process    Blood Culture - Blood, Hand, Right In process    Green Top (Gel) In process    [place] Draw In process             Jose Luis Kamara DO  11/05/22  14:38 EDT    Time: I spent 45 minutes on this discharge activity which included: face-to-face encounter with the patient, reviewing the data in the system, coordination of the care with the nursing staff as well as consultants, documentation, and entering orders.     Dictated utilizing Dragon dictation.

## 2022-11-05 NOTE — ED PROVIDER NOTES
Subjective   History of Present Illness  47-year-old female presenting with fever.  She is brought in by her  who provides all of the history due to patient previous stroke.  He states that he was washing patient's face and her head felt very hot.  She had a fever of about 103 at home.  He called 911 to have patient brought here for evaluation.  He notes no other issues or changes.  She has some wheezing that he states is chronic.  There have been no episodes of vomiting, diarrhea or increased cough.        Review of Systems   Unable to perform ROS: Patient nonverbal       Past Medical History:   Diagnosis Date   • COPD (chronic obstructive pulmonary disease) ([facility])    • Hypertension    • Paralysis due to old stroke ([facility])     right sided   • Stroke ([facility])        Allergies   Allergen Reactions   • Codeine Nausea And Vomiting   • Penicillins Unknown (See Comments)     As a child        Past Surgical History:   Procedure Laterality Date   • BRAIN SURGERY     • TUBAL ABDOMINAL LIGATION         History reviewed. No pertinent family history.    Social History     Socioeconomic History   • Marital status:    Tobacco Use   • Smoking status: Every Day     Packs/day: 0.50     Types: Cigarettes   Substance and Sexual Activity   • Alcohol use: No   • Drug use: No   • Sexual activity: Defer           Objective   Physical Exam  Constitutional:       General: She is not in acute distress.     Appearance: She is not toxic-appearing or diaphoretic.      Comments: Chronically ill-appearing   HENT:      Head: Atraumatic.      Comments: Chronic deformity left skull     Right Ear: External ear normal.      Left Ear: External ear normal.      Nose: Nose normal.      Mouth/Throat:      Mouth: Mucous membranes are moist.      Pharynx: Oropharynx is clear.   Eyes:      Extraocular Movements: Extraocular movements intact.      Conjunctiva/sclera: Conjunctivae normal.      Pupils: Pupils are equal, round, and reactive to light.    Cardiovascular:      Rate and Rhythm: Regular rhythm.      Pulses: Normal pulses.      Heart sounds: Normal heart sounds.      Comments: Tachycardic  Pulmonary:      Comments: Normal effort, coarse breath sounds throughout  Abdominal:      General: Bowel sounds are normal. There is no distension.      Tenderness: There is no abdominal tenderness.   Musculoskeletal:         General: No swelling, tenderness or deformity. Normal range of motion.      Cervical back: Normal range of motion.   Skin:     General: Skin is warm and dry.      Capillary Refill: Capillary refill takes less than 2 seconds.      Findings: No rash.   Neurological:      Mental Status: She is alert.      Comments: Right-sided paralysis chronic   Psychiatric:      Comments: Unable to assess         Procedures           ED Course                                           MDM  Number of Diagnoses or Management Options  Acute respiratory failure with hypoxia (HCC)  Influenza A  Diagnosis management comments: 47-year-old female with fever.  Chronically ill-appearing lady with exam as above.  She is notably tachycardic.  Her exam is notable for some coarse breath sounds that  states is chronic.  Will obtain labs, viral panel, give fluids empiric antibiotics as she is screening positive for sepsis.  Disposition pending.    DDx: Pneumonia, viral illness    EKG interpreted by me: Sinus tachycardia, no obvious acute ST/T changes, this is an abnormal EKG    Blood work is largely unremarkable.  Patient has tested positive for influenza A.  Chest x-ray per my interpretation reveals no obvious acute infiltrates.  I have initiated treatment with Tamiflu.  Given her hypoxia I have recommended admission, her  is agreeable.  Discussed with Dr. Kerley who graciously accepts patient for admission.      Final diagnoses:   Influenza A   Acute respiratory failure with hypoxia (HCC)          Augusto Alvarez MD  11/05/22 0000

## 2022-11-05 NOTE — PLAN OF CARE
Goal Outcome Evaluation:         Patient clear for discharge per attending, all personal belongings sent with .

## 2022-11-05 NOTE — OUTREACH NOTE
Prep Survey    Flowsheet Row Responses   [facility] patient discharged from? [facility]   Is LACE score < 7 ? Yes   Emergency Room discharge w/ pulse ox? No   Eligibility [facility]   Date of Admission 11/05/22   Date of Discharge 11/05/22   Discharge Disposition Home or Self Care   Discharge diagnosis Influenza A   Does the patient have one of the following disease processes/diagnoses(primary or secondary)? Other   Does the patient have Home health ordered? No   Is there a DME ordered? No   Prep survey completed? Yes          MATT SHARMA - Registered Nurse

## 2022-11-05 NOTE — H&P
"  [facility]      Name:  Roxi Valerio   Age:  47 y.o.  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  Presented to [facility] ED on 2022,  had concern for patient \"feeling hot\" found to have a fever of 103.   noted no other issues or changes recently.  She has had no cough, vomiting, diarrhea, or concerning pain.  Patient denied any pain at time of admission.  Able to shake head yes and no appropriately, has dysarthria at baseline and sometimes unintelligible speech.    ED summary: Presented with fever of 100.8, heart rate 126, requiring 2 L nasal cannula as a new requirement.  EKG sinus tachycardia no ST elevations or depressions.  ABG only showed some hypoxia.  Potassium 5.3.  Lactate normal, procalcitonin normal.  CBC unremarkable.  Blood cultures collected.  Viral respiratory panel positive for flu A.  CXR no focal consolidation, radiology interpretation pending.  She was provided Rocephin, Tylenol, DuoNeb, Solu-Medrol 125 mg IV, Tamiflu, normal saline bolus 2520 mL.     Review Of Systems:    All systems were reviewed and negative except as mentioned in history of presenting illness, assessment and plan.    Past Medical History: Patient  has a past medical history of COPD (chronic obstructive pulmonary disease) (HCC), Hypertension, Paralysis due to old stroke (HCC), and Stroke (HCC).    Past Surgical History: Patient  has a past surgical history that includes Tubal ligation and Brain surgery.    Social History: Patient  reports that she has been smoking cigarettes. She " has been smoking an average of .5 packs per day. She does not have any smokeless tobacco history on file. She reports that she does not drink alcohol and does not use drugs.    Family History:  Patient's family history has been reviewed and found to be noncontributory.     Allergies:      Codeine and Penicillins    Home Medications:    Prior to Admission Medications     Prescriptions Last Dose Informant Patient Reported? Taking?    citalopram (CeleXA) 10 MG tablet   No No    Take 1 tablet by mouth Daily.    dilTIAZem (CARDIZEM) 90 MG tablet   No No    Take 1 tablet by mouth 4 (Four) Times a Day.    enoxaparin (Lovenox) 40 MG/0.4ML solution syringe   No No    Inject 0.4 mL under the skin into the appropriate area as directed Daily.    famotidine (Pepcid) 20 MG tablet   No No    Take 1 tablet by mouth Daily.    hydroCHLOROthiazide (MICROZIDE) 12.5 MG capsule   No No    Take 1 capsule by mouth Daily.    HYDROcodone-acetaminophen (Norco) 5-325 MG per tablet   No No    ONE TABLET PO EVERY 6 HOURS    levETIRAcetam (KEPPRA) 100 MG/ML solution   No No    2.5 mL by Per G Tube route 2 (Two) Times a Day.    losartan (COZAAR) 50 MG tablet   Yes No    Take 100 mg by mouth Daily.    methylphenidate (Ritalin) 5 MG tablet   No No    Take 1 tablet by mouth 2 (Two) Times a Day.    metoprolol tartrate (LOPRESSOR) 100 MG tablet   No No    Take 1 tablet by mouth 2 (Two) Times a Day.    ondansetron (Zofran) 4 MG tablet   No No    Take 1 tablet by mouth Every 8 (Eight) Hours As Needed for Nausea or Vomiting.    traMADol (ULTRAM) 50 MG tablet   No No    Take 1 tablet by mouth Every 8 (Eight) Hours As Needed for Moderate Pain .        ED Medications:    Medications   sodium chloride 0.9 % flush 10 mL (has no administration in time range)   oseltamivir (TAMIFLU) capsule 75 mg (has no administration in time range)   sodium chloride 0.9 % flush 10 mL (has no administration in time range)   sodium chloride 0.9 % flush 10 mL (has no  administration in time range)   acetaminophen (TYLENOL) tablet 650 mg (has no administration in time range)     Or   acetaminophen (TYLENOL) 160 MG/5ML solution 650 mg (has no administration in time range)     Or   acetaminophen (TYLENOL) suppository 650 mg (has no administration in time range)   ondansetron (ZOFRAN) injection 4 mg (has no administration in time range)   Enoxaparin Sodium (LOVENOX) syringe 40 mg (has no administration in time range)   ipratropium-albuterol (DUO-NEB) nebulizer solution 3 mL (has no administration in time range)   albuterol (PROVENTIL) nebulizer solution 0.083% 2.5 mg/3mL (has no administration in time range)   methylPREDNISolone sodium succinate (SOLU-Medrol) injection 40 mg (has no administration in time range)   cefTRIAXone (ROCEPHIN) IVPB 2 g/50ml dextrose (premix) (0 g Intravenous Stopped 11/4/22 2357)   sodium chloride 0.9 % bolus 2,520 mL (2,520 mL Intravenous New Bag 11/4/22 2127)   ipratropium-albuterol (DUO-NEB) nebulizer solution 3 mL (3 mL Nebulization Given 11/4/22 2208)   methylPREDNISolone sodium succinate (SOLU-Medrol) injection 125 mg (125 mg Intravenous Given 11/4/22 2127)   oseltamivir (TAMIFLU) capsule 75 mg (75 mg Oral Given 11/4/22 2253)   acetaminophen (TYLENOL) tablet 1,000 mg (1,000 mg Oral Given 11/4/22 2253)     Vital Signs:  Temp:  [100.8 °F (38.2 °C)] 100.8 °F (38.2 °C)  Heart Rate:  [104-135] 104  Resp:  [20-24] 22  BP: (107-123)/() 113/79    There were no vitals filed for this visit.  There is no height or weight on file to calculate BMI.    Physical Exam:     Most recent vital Signs: /79   Pulse 104   Temp (!) 100.8 °F (38.2 °C) (Oral)   Resp 22   SpO2 94%     Physical Exam  Constitutional:       General: She is in acute distress.      Appearance: She is obese. She is diaphoretic.      Comments: Mild respiratory distress with some increased work of breathing   HENT:      Mouth/Throat:      Mouth: Mucous membranes are moist.   Eyes:       Pupils: Pupils are equal, round, and reactive to light.   Cardiovascular:      Rate and Rhythm: Normal rate and regular rhythm.      Pulses: Normal pulses.      Heart sounds: Normal heart sounds.   Pulmonary:      Comments: Mild increased work of breathing, wheezing in all fields  Abdominal:      Tenderness: There is no abdominal tenderness.   Musculoskeletal:      Comments: Right upper and lower no motor movement, baseline   Neurological:      Mental Status: She is alert.      Comments: Dysarthria at baseline, able to shake head yes and no appropriately with questions   Psychiatric:      Comments: Did appear anxious         Laboratory data:    I have reviewed the labs done in the emergency room.    Results from last 7 days   Lab Units 11/04/22 2038   SODIUM mmol/L 134*   POTASSIUM mmol/L 5.3*   CHLORIDE mmol/L 101   CO2 mmol/L 23.5   BUN mg/dL 19   CREATININE mg/dL 0.73   CALCIUM mg/dL 7.7*   BILIRUBIN mg/dL 0.2   ALK PHOS U/L 82   ALT (SGPT) U/L 25   AST (SGOT) U/L 31   GLUCOSE mg/dL 92     Results from last 7 days   Lab Units 11/04/22 2038   WBC 10*3/mm3 8.64   HEMOGLOBIN g/dL 11.0*   HEMATOCRIT % 33.1*   PLATELETS 10*3/mm3 193                         Results from last 7 days   Lab Units 11/04/22  2219   PH, ARTERIAL pH units 7.407   PO2 ART mm Hg 63.4*   PCO2, ARTERIAL mm Hg 38.3   HCO3 ART mmol/L 24.1           Invalid input(s): USDES,  BLOODU, NITRITITE, BACT, EP    Pain Management Panel    There is no flowsheet data to display.         EKG:       EKG sinus tachycardia no ST elevations or depressions.    Radiology:    No radiology results for the last 3 days    Assessment/Plan:    11/5  -Inpatient general floor admission for acute respiratory failure with hypoxia requiring 2 L nasal cannula secondary to influenza A infection, suspected element of COPD exacerbation as well.  No previous home O2 requirement.  Patient had some increased work of breathing, do suspect patient's dysarthria is baseline per report  from ED. Shaking head yes and no appropriately, denied any pain.  Unsuccessful attempt to contact patient's  on telephone.  Default full code at time of admission.      Acute respiratory failure with hypoxia  Influenza A infection  COPD exacerbation, suspected  -2 L nasal cannula required upon admission early a.m. 11/5, wean as tolerated.  No previous home O2 requirement.  -Tamiflu for 5 days.   -DuoNeb as needed.  -Solu-Medrol IV.      history of right-sided paralysis from stroke  dysarthria  COPD  hypertension  Tobacco use disorder  history brain surgery  on Keppra    Continue home medications.  Reconciliation pending.    Risk Assessment: High  DVT Prophylaxis: Lovenox  Code Status: Full code, need to confirm with   Diet: Carbohydrate controlled    Advance Care Planning   ACP discussion was held with the patient during this visit. Patient does not have an advance directive, declines further assistance.           Brian Joseph Kerley, DO  11/05/22  00:49 EDT    Dictated utilizing Dragon dictation.

## 2022-11-05 NOTE — PLAN OF CARE
Goal Outcome Evaluation:  Plan of Care Reviewed With: patient        Progress: no change  Outcome Evaluation: Patient is mostly non verbal due to previous stroke. Low grade fever. Vital signs have been stable and will continue to monitor. No overnight events to report.

## 2022-11-06 ENCOUNTER — HOSPITAL ENCOUNTER (EMERGENCY)
Facility: HOSPITAL | Age: 47
Discharge: HOME | End: 2022-11-06
Attending: EMERGENCY MEDICINE | Admitting: EMERGENCY MEDICINE

## 2022-11-06 ENCOUNTER — APPOINTMENT (OUTPATIENT)
Dept: CT IMAGING | Facility: HOSPITAL | Age: 47
End: 2022-11-06

## 2022-11-06 VITALS
SYSTOLIC BLOOD PRESSURE: 129 MMHG | HEIGHT: 61 IN | RESPIRATION RATE: 16 BRPM | OXYGEN SATURATION: 94 % | HEART RATE: 101 BPM | WEIGHT: 204 LBS | DIASTOLIC BLOOD PRESSURE: 81 MMHG | BODY MASS INDEX: 38.51 KG/M2 | TEMPERATURE: 99.4 F

## 2022-11-06 DIAGNOSIS — G96.00 CSF LEAK: Primary | ICD-10-CM

## 2022-11-06 LAB
ALBUMIN SERPL-MCNC: 3.5 G/DL (ref 3.5–5.2)
ALBUMIN/GLOB SERPL: 1 G/DL
ALP SERPL-CCNC: 75 U/L (ref 39–117)
ALT SERPL W P-5'-P-CCNC: 21 U/L (ref 1–33)
ANION GAP SERPL CALCULATED.3IONS-SCNC: 10.6 MMOL/L (ref 5–15)
APPEARANCE CSF: CLEAR
APTT PPP: 20 SECONDS (ref 70–100)
AST SERPL-CCNC: 16 U/L (ref 1–32)
BASOPHILS # BLD AUTO: 0.03 10*3/MM3 (ref 0–0.2)
BASOPHILS NFR BLD AUTO: 0.3 % (ref 0–1.5)
BILIRUB SERPL-MCNC: <0.2 MG/DL (ref 0–1.2)
BILIRUB UR QL STRIP: NEGATIVE
BUN SERPL-MCNC: 21 MG/DL (ref 6–20)
BUN/CREAT SERPL: 31.8 (ref 7–25)
CALCIUM SPEC-SCNC: 8 MG/DL (ref 8.6–10.5)
CHLORIDE SERPL-SCNC: 102 MMOL/L (ref 98–107)
CLARITY UR: CLEAR
CO2 SERPL-SCNC: 24.4 MMOL/L (ref 22–29)
COLOR CSF: COLORLESS
COLOR UR: YELLOW
CREAT SERPL-MCNC: 0.66 MG/DL (ref 0.57–1)
D-LACTATE SERPL-SCNC: 1.1 MMOL/L (ref 0.5–2)
DEPRECATED RDW RBC AUTO: 48.1 FL (ref 37–54)
EGFRCR SERPLBLD CKD-EPI 2021: 107.7 ML/MIN/1.73
EOSINOPHIL # BLD AUTO: 0.02 10*3/MM3 (ref 0–0.4)
EOSINOPHIL NFR BLD AUTO: 0.2 % (ref 0.3–6.2)
ERYTHROCYTE [DISTWIDTH] IN BLOOD BY AUTOMATED COUNT: 14.3 % (ref 12.3–15.4)
GLOBULIN UR ELPH-MCNC: 3.4 GM/DL
GLUCOSE CSF-MCNC: 58 MG/DL (ref 40–70)
GLUCOSE SERPL-MCNC: 86 MG/DL (ref 65–99)
GLUCOSE UR STRIP-MCNC: ABNORMAL MG/DL
HCT VFR BLD AUTO: 31 % (ref 34–46.6)
HGB BLD-MCNC: 9.9 G/DL (ref 12–15.9)
HGB UR QL STRIP.AUTO: NEGATIVE
HOLD SPECIMEN: NORMAL
IMM GRANULOCYTES # BLD AUTO: 0.03 10*3/MM3 (ref 0–0.05)
IMM GRANULOCYTES NFR BLD AUTO: 0.3 % (ref 0–0.5)
INR PPP: 0.91 (ref 0.9–1.1)
KETONES UR QL STRIP: NEGATIVE
LEUKOCYTE ESTERASE UR QL STRIP.AUTO: NEGATIVE
LYMPHOCYTES # BLD AUTO: 2.27 10*3/MM3 (ref 0.7–3.1)
LYMPHOCYTES NFR BLD AUTO: 22.4 % (ref 19.6–45.3)
MAGNESIUM SERPL-MCNC: 2 MG/DL (ref 1.6–2.6)
MCH RBC QN AUTO: 29.4 PG (ref 26.6–33)
MCHC RBC AUTO-ENTMCNC: 31.9 G/DL (ref 31.5–35.7)
MCV RBC AUTO: 92 FL (ref 79–97)
MONOCYTES # BLD AUTO: 0.78 10*3/MM3 (ref 0.1–0.9)
MONOCYTES NFR BLD AUTO: 7.7 % (ref 5–12)
NEUTROPHILS NFR BLD AUTO: 6.99 10*3/MM3 (ref 1.7–7)
NEUTROPHILS NFR BLD AUTO: 69.1 % (ref 42.7–76)
NITRITE UR QL STRIP: NEGATIVE
NRBC BLD AUTO-RTO: 0 /100 WBC (ref 0–0.2)
PH UR STRIP.AUTO: 6 [PH] (ref 5–8)
PLATELET # BLD AUTO: 172 10*3/MM3 (ref 140–450)
PMV BLD AUTO: 11.3 FL (ref 6–12)
POTASSIUM SERPL-SCNC: 4.2 MMOL/L (ref 3.5–5.2)
PROCALCITONIN SERPL-MCNC: 0.05 NG/ML (ref 0–0.25)
PROT CSF-MCNC: 25 MG/DL (ref 15–45)
PROT SERPL-MCNC: 6.9 G/DL (ref 6–8.5)
PROT UR QL STRIP: ABNORMAL
PROTHROMBIN TIME: 12.6 SECONDS (ref 12.5–14.5)
RBC # BLD AUTO: 3.37 10*6/MM3 (ref 3.77–5.28)
RBC # CSF MANUAL: 136 /MM3 (ref 0–0)
RBC MORPH BLD: NORMAL
SMALL PLATELETS BLD QL SMEAR: ADEQUATE
SODIUM SERPL-SCNC: 137 MMOL/L (ref 136–145)
SP GR UR STRIP: 1.02 (ref 1–1.03)
TUBE # CSF: 4
UROBILINOGEN UR QL STRIP: ABNORMAL
WBC # CSF MANUAL: 1 /MM3 (ref 0–5)
WBC MORPH BLD: NORMAL
WBC NRBC COR # BLD: 10.12 10*3/MM3 (ref 3.4–10.8)

## 2022-11-06 PROCEDURE — 82945 GLUCOSE OTHER FLUID: CPT | Performed by: PHYSICIAN ASSISTANT

## 2022-11-06 PROCEDURE — 84157 ASSAY OF PROTEIN OTHER: CPT | Performed by: PHYSICIAN ASSISTANT

## 2022-11-06 PROCEDURE — 96374 THER/PROPH/DIAG INJ IV PUSH: CPT

## 2022-11-06 PROCEDURE — 83735 ASSAY OF MAGNESIUM: CPT | Performed by: PHYSICIAN ASSISTANT

## 2022-11-06 PROCEDURE — 99284 EMERGENCY DEPT VISIT MOD MDM: CPT

## 2022-11-06 PROCEDURE — 85730 THROMBOPLASTIN TIME PARTIAL: CPT | Performed by: PHYSICIAN ASSISTANT

## 2022-11-06 PROCEDURE — 85025 COMPLETE CBC W/AUTO DIFF WBC: CPT | Performed by: PHYSICIAN ASSISTANT

## 2022-11-06 PROCEDURE — 25010000002 DIAZEPAM PER 5 MG: Performed by: EMERGENCY MEDICINE

## 2022-11-06 PROCEDURE — 89050 BODY FLUID CELL COUNT: CPT | Performed by: PHYSICIAN ASSISTANT

## 2022-11-06 PROCEDURE — 87205 SMEAR GRAM STAIN: CPT | Performed by: PHYSICIAN ASSISTANT

## 2022-11-06 PROCEDURE — 70450 CT HEAD/BRAIN W/O DYE: CPT

## 2022-11-06 PROCEDURE — 87498 ENTEROVIRUS PROBE&REVRS TRNS: CPT | Performed by: PHYSICIAN ASSISTANT

## 2022-11-06 PROCEDURE — 87529 HSV DNA AMP PROBE: CPT | Performed by: PHYSICIAN ASSISTANT

## 2022-11-06 PROCEDURE — 85610 PROTHROMBIN TIME: CPT | Performed by: PHYSICIAN ASSISTANT

## 2022-11-06 PROCEDURE — 85007 BL SMEAR W/DIFF WBC COUNT: CPT | Performed by: PHYSICIAN ASSISTANT

## 2022-11-06 PROCEDURE — 87015 SPECIMEN INFECT AGNT CONCNTJ: CPT | Performed by: PHYSICIAN ASSISTANT

## 2022-11-06 PROCEDURE — 81003 URINALYSIS AUTO W/O SCOPE: CPT | Performed by: EMERGENCY MEDICINE

## 2022-11-06 PROCEDURE — 83605 ASSAY OF LACTIC ACID: CPT | Performed by: PHYSICIAN ASSISTANT

## 2022-11-06 PROCEDURE — 87040 BLOOD CULTURE FOR BACTERIA: CPT | Performed by: PHYSICIAN ASSISTANT

## 2022-11-06 PROCEDURE — 87070 CULTURE OTHR SPECIMN AEROBIC: CPT | Performed by: PHYSICIAN ASSISTANT

## 2022-11-06 PROCEDURE — 80053 COMPREHEN METABOLIC PANEL: CPT | Performed by: PHYSICIAN ASSISTANT

## 2022-11-06 PROCEDURE — 84145 PROCALCITONIN (PCT): CPT | Performed by: PHYSICIAN ASSISTANT

## 2022-11-06 RX ORDER — BUTALBITAL, ACETAMINOPHEN AND CAFFEINE 50; 325; 40 MG/1; MG/1; MG/1
1 TABLET ORAL ONCE
Status: COMPLETED | OUTPATIENT
Start: 2022-11-06 | End: 2022-11-06

## 2022-11-06 RX ORDER — DIAZEPAM 5 MG/ML
5 INJECTION, SOLUTION INTRAMUSCULAR; INTRAVENOUS EVERY 4 HOURS PRN
Status: DISCONTINUED | OUTPATIENT
Start: 2022-11-06 | End: 2022-11-06 | Stop reason: HOSPADM

## 2022-11-06 RX ADMIN — BUTALBITAL, ACETAMINOPHEN, AND CAFFEINE 1 TABLET: 50; 325; 40 TABLET ORAL at 19:38

## 2022-11-06 RX ADMIN — SODIUM CHLORIDE 1000 ML: 9 INJECTION, SOLUTION INTRAVENOUS at 17:57

## 2022-11-06 RX ADMIN — BUTALBITAL, ACETAMINOPHEN, AND CAFFEINE 1 TABLET: 50; 325; 40 TABLET ORAL at 17:57

## 2022-11-06 RX ADMIN — DIAZEPAM 2 MG: 5 INJECTION, SOLUTION INTRAMUSCULAR; INTRAVENOUS at 18:06

## 2022-11-06 NOTE — ED PROVIDER NOTES
Subjective   History of Present Illness   Patient is a 47-year-old female with history of COPD, hypertension, right-sided paralysis secondary to an intracranial hemorrhage which occurred in February 2021 as a result of being on anticoagulation for DVT.  Per review of records, patient has had cranioplasty which subsequently became infected and patient has had to be on IV antibiotics through a PICC line and has had multiple surgeries performed by neurosurgery due to cranial osteomyelitis since then.  She follows with Dr. Wilburn in [place].  Patient has been nonverbal since the stroke but  states that she understands what you are saying and can normally follow commands.  He states that today he noticed she was more lethargic than normal and had increased swelling to the left side of her head that was draining yellow liquid and was painful so he brought her in for further evaluation.  Patient was admitted here from 11-4-2022 to 11-5-2022 for influenza with acute respiratory failure, borderline hypoxia.   states she was okay after discharge until this morning when he noticed the swelling to her head.    Per review of records, patient was seen at  around a week ago and diagnosed with CSF leak then. MRI was performed and patient was discharged with outpatient follow-up. However, she missed her NS appt. Yesterday as she had been admitted here and was discharged yesterday.    Review of Systems   Constitutional: Positive for fatigue.   Neurological: Positive for weakness and headaches.        Increased swelling to left side of head with tenderness and drainage of yellow/clear fluid   All other systems reviewed and are negative.      Past Medical History:   Diagnosis Date   • COPD (chronic obstructive pulmonary disease) (HCC)    • Hypertension    • Paralysis due to old stroke (HCC)     right sided   • Stroke (HCC)        Allergies   Allergen Reactions   • Codeine Nausea And Vomiting   • Penicillins Unknown  (See Comments)     As a child        Past Surgical History:   Procedure Laterality Date   • BRAIN SURGERY     • TUBAL ABDOMINAL LIGATION         History reviewed. No pertinent family history.    Social History     Socioeconomic History   • Marital status:    Tobacco Use   • Smoking status: Every Day     Packs/day: 0.50     Types: Cigarettes   Substance and Sexual Activity   • Alcohol use: No   • Drug use: No   • Sexual activity: Defer           Objective   Physical Exam  Vitals and nursing note reviewed.   Constitutional:       General: She is not in acute distress.     Appearance: She is not toxic-appearing.   HENT:      Head:      Comments: Swelling noted to left head, clear liquid draining from scalp     Right Ear: External ear normal.      Left Ear: External ear normal.      Nose: Nose normal.   Eyes:      Extraocular Movements: Extraocular movements intact.      Conjunctiva/sclera: Conjunctivae normal.   Cardiovascular:      Rate and Rhythm: Normal rate.      Heart sounds: Normal heart sounds.   Pulmonary:      Effort: Pulmonary effort is normal.      Breath sounds: Normal breath sounds.   Abdominal:      General: There is no distension.      Palpations: Abdomen is soft.   Musculoskeletal:         General: Normal range of motion.      Cervical back: Normal range of motion and neck supple.   Skin:     General: Skin is warm and dry.   Neurological:      General: No focal deficit present.      Mental Status: She is alert. Mental status is at baseline.      Comments: Baseline right-sided paralysis   Psychiatric:         Mood and Affect: Mood normal.         Behavior: Behavior normal.         Procedures           ED Course  ED Course as of 11/06/22 2027   Sun Nov 06, 2022   1721 Spoke with Dr. Clarke with Neurosurgery at  and they report patient was in their ER over a week ago with similar complaints and diagnosed with CSF leak at that time. They report they are on divert and only accepting surgical  emergencies. She states she does not believe that the patient needs an emergent surgery and recommends an LP to rule out meningitis. She states if meningitis is ruled out, patient can follow up with them promptly outpatient. States patient had an appt. With NS yesterday but missed it. This was likely because she was discharged from here yesterday. Images have been powershared for Dr. Clarke to review also. [AP]      ED Course User Index  [AP] Geetha Nielsen, KOLTON           Lab Results (last 24 hours)     Procedure Component Value Units Date/Time    CBC & Differential [[id number]]  (Abnormal) Collected: 11/06/22 1450    Specimen: Blood Updated: 11/06/22 1526    Narrative:      The following orders were created for panel order CBC & Differential.  Procedure                               Abnormality         Status                     ---------                               -----------         ------                     CBC Auto Differential[[id number]]        Abnormal            Final result               Scan Slide[[id number]]                                       Final result                 Please view results for these tests on the individual orders.    Comprehensive Metabolic Panel [[id number]]  (Abnormal) Collected: 11/06/22 1450    Specimen: Blood Updated: 11/06/22 1524     Glucose 86 mg/dL      BUN 21 mg/dL      Creatinine 0.66 mg/dL      Sodium 137 mmol/L      Potassium 4.2 mmol/L      Chloride 102 mmol/L      CO2 24.4 mmol/L      Calcium 8.0 mg/dL      Total Protein 6.9 g/dL      Albumin 3.50 g/dL      ALT (SGPT) 21 U/L      AST (SGOT) 16 U/L      Alkaline Phosphatase 75 U/L      Total Bilirubin <0.2 mg/dL      Globulin 3.4 gm/dL      A/G Ratio 1.0 g/dL      BUN/Creatinine Ratio 31.8     Anion Gap 10.6 mmol/L      eGFR 107.7 mL/min/1.73      Comment: National Kidney Foundation and American Society of Nephrology (ASN) Task Force recommended calculation based on the Chronic Kidney Disease Epidemiology Collaboration  (CKD-EPI) equation refit without adjustment for race.       Narrative:      GFR Normal >60  Chronic Kidney Disease <60  Kidney Failure <15      Magnesium [[id number]]  (Normal) Collected: 11/06/22 1450    Specimen: Blood Updated: 11/06/22 1524     Magnesium 2.0 mg/dL     aPTT [[id number]]  (Abnormal) Collected: 11/06/22 1450    Specimen: Blood Updated: 11/06/22 1521     PTT 20.0 seconds     Protime-INR [[id number]]  (Normal) Collected: 11/06/22 1450    Specimen: Blood Updated: 11/06/22 1521     Protime 12.6 Seconds      INR 0.91    Narrative:      Suggested INR therapeutic range for stable oral anticoagulant therapy:    Low Intensity therapy:   1.5-2.0  Moderate Intensity therapy:   2.0-3.0  High Intensity therapy:   2.5-4.0    CBC Auto Differential [[id number]]  (Abnormal) Collected: 11/06/22 1450    Specimen: Blood Updated: 11/06/22 1526     WBC 10.12 10*3/mm3      RBC 3.37 10*6/mm3      Hemoglobin 9.9 g/dL      Hematocrit 31.0 %      MCV 92.0 fL      MCH 29.4 pg      MCHC 31.9 g/dL      RDW 14.3 %      RDW-SD 48.1 fl      MPV 11.3 fL      Platelets 172 10*3/mm3      Neutrophil % 69.1 %      Lymphocyte % 22.4 %      Monocyte % 7.7 %      Eosinophil % 0.2 %      Basophil % 0.3 %      Immature Grans % 0.3 %      Neutrophils, Absolute 6.99 10*3/mm3      Lymphocytes, Absolute 2.27 10*3/mm3      Monocytes, Absolute 0.78 10*3/mm3      Eosinophils, Absolute 0.02 10*3/mm3      Basophils, Absolute 0.03 10*3/mm3      Immature Grans, Absolute 0.03 10*3/mm3      nRBC 0.0 /100 WBC     Scan Slide [[id number]] Collected: 11/06/22 1450    Specimen: Blood Updated: 11/06/22 1526     RBC Morphology Normal     WBC Morphology Normal     Platelet Estimate Adequate    Lactic Acid, Plasma [[id number]]  (Normal) Collected: 11/06/22 1508    Specimen: Blood from Arm, Right Updated: 11/06/22 1552     Lactate 1.1 mmol/L     Procalcitonin [[id number]]  (Normal) Collected: 11/06/22 1508    Specimen: Blood Updated: 11/06/22 1600     Procalcitonin 0.05  "ng/mL     Narrative:      As a Marker for Sepsis (Non-Neonates):    1. <0.5 ng/mL represents a low risk of severe sepsis and/or septic shock.  2. >2 ng/mL represents a high risk of severe sepsis and/or septic shock.    As a Marker for Lower Respiratory Tract Infections that require antibiotic therapy:    PCT on Admission    Antibiotic Therapy       6-12 Hrs later    >0.5                Strongly Recommended  >0.25 - <0.5        Recommended   0.1 - 0.25          Discouraged              Remeasure/reassess PCT  <0.1                Strongly Discouraged     Remeasure/reassess PCT    As 28 day mortality risk marker: \"Change in Procalcitonin Result\" (>80% or <=80%) if Day 0 (or Day 1) and Day 4 values are available. Refer to http://www.[facility]-pct-calculator.com    Change in PCT <=80%  A decrease of PCT levels below or equal to 80% defines a positive change in PCT test result representing a higher risk for 28-day all-cause mortality of patients diagnosed with severe sepsis for septic shock.    Change in PCT >80%  A decrease of PCT levels of more than 80% defines a negative change in PCT result representing a lower risk for 28-day all-cause mortality of patients diagnosed with severe sepsis or septic shock.       Blood Culture - Blood, Arm, Right [[id number]] Collected: 11/06/22 1508    Specimen: Blood from Arm, Right Updated: 11/06/22 1529    Blood Culture - Blood, Arm, Left [[id number]] Collected: 11/06/22 1520    Specimen: Blood from Arm, Left Updated: 11/06/22 1529    Protein, CSF - Cerebrospinal Fluid, Lumbar Puncture [[id number]]  (Normal) Collected: 11/06/22 1842    Specimen: Cerebrospinal Fluid from Lumbar Puncture Updated: 11/06/22 1916     Protein, Total (CSF) 25.0 mg/dL     Glucose, CSF - Cerebrospinal Fluid, Lumbar Puncture [[id number]]  (Normal) Collected: 11/06/22 1842    Specimen: Cerebrospinal Fluid from Lumbar Puncture Updated: 11/06/22 1916     Glucose, CSF 58 mg/dL     CSF Tube - Cerebrospinal Fluid, Lumbar " Puncture [[id number]] Collected: 11/06/22 1842    Specimen: Cerebrospinal Fluid from Lumbar Puncture Updated: 11/06/22 1933     Extra Tube hold    Enterovirus RT-PCR - Cerebrospinal Fluid, Lumbar Puncture [[id number]] Collected: 11/06/22 1842    Specimen: Cerebrospinal Fluid from Lumbar Puncture Updated: 11/06/22 1849    Herpes Simplex Virus (HSV) Types 1/2, Cerebrospinal Fluid (CSF), DNA PCR - Cerebrospinal Fluid, Lumbar Puncture [[id number]] Collected: 11/06/22 1842    Specimen: Cerebrospinal Fluid from Lumbar Puncture Updated: 11/06/22 1847    Cell Count With Differential, CSF Use CSF Tube: 4 [[id number]]  (Abnormal) Collected: 11/06/22 1843    Specimen: Cerebrospinal Fluid from Lumbar Puncture Updated: 11/06/22 1927    Narrative:      The following orders were created for panel order Cell Count With Differential, CSF Use CSF Tube: 4.  Procedure                               Abnormality         Status                     ---------                               -----------         ------                     Cell Count, CSF - Cerebr...[[id number]]  Abnormal            Final result                 Please view results for these tests on the individual orders.    Culture, CSF - Cerebrospinal Fluid, Lumbar Puncture [[id number]] Collected: 11/06/22 1843    Specimen: Cerebrospinal Fluid from Lumbar Puncture Updated: 11/06/22 1928     Gram Stain No WBCs or organisms seen    Cell Count, CSF - Cerebrospinal Fluid, Lumbar Puncture [[id number]]  (Abnormal) Collected: 11/06/22 1843    Specimen: Cerebrospinal Fluid from Lumbar Puncture Updated: 11/06/22 1927     WBC, CSF 1 /mm3      RBC,  /mm3      Color, CSF Colorless     Appearance, CSF Clear     Tube Number, CSF 4    Narrative:      Differential not indicated.    Urinalysis With Microscopic If Indicated (No Culture) - Urine, Clean Catch [[id number]]  (Abnormal) Collected: 11/06/22 1931    Specimen: Urine, Clean Catch Updated: 11/06/22 1948     Color, UA Yellow     Appearance,  UA Clear     pH, UA 6.0     Specific Gravity, UA 1.020     Glucose,  mg/dL (1+)     Ketones, UA Negative     Bilirubin, UA Negative     Blood, UA Negative     Protein, UA Trace     Leuk Esterase, UA Negative     Nitrite, UA Negative     Urobilinogen, UA 0.2 E.U./dL    Narrative:      Urine microscopic not indicated.             CT Head Without Contrast    Result Date: 11/6/2022  CT HEAD WITHOUT CONTRAST  HISTORY: Swelling of the left side  TECHNIQUE: Noncontrast exam  FINDINGS: No acute large territory infarct, no intracranial hemorrhage. Status post left frontoparietal craniectomy with interval worsening of left cerebral hemisphere encephalomalacia with left lateral ventricle ex vacuo ventriculomegaly. There is suspected CSF leak into the left scalp and temporal soft tissues. The visualized sinuses, orbits and petrous temporal bones appear unremarkable.      Impression: 1. No acute intracranial abnormality. 2. Postoperative changes related to prior left frontoparietal craniectomy with worsening left cerebral hemisphere encephalomalacia. 3. Suspected CSF leak into the soft tissues of the left scalp and temporal soft tissues.      This study was performed using dose reduction techniques to achieve radiation exposure as low as reasonably achievable (ALARA)   This report was signed and finalized on 11/6/2022 4:25 PM by Quinn Dangelo MD.    XR Chest 1 View    Result Date: 11/5/2022  PORTABLE CHEST  11/4/2022 9:23 PM  HISTORY: Shortness of breath  COMPARISON:   None  FINDINGS: The cardiac silhouette is normal in size. The mediastinal and hilar contours are unremarkable. Hypoinflated lungs. No focal consolidation. There is no pneumothorax. The visualized osseous structures demonstrate no acute abnormalities.      Impression: No acute cardiopulmonary process.      This report was signed and finalized on 11/5/2022 7:36 AM by Quinn Dangelo MD.                     /81 (BP Location: Left arm, Patient  "Position: Sitting)   Pulse 101   Temp 99.4 °F (37.4 °C) (Oral)   Resp 16   Ht 154.9 cm (61\")   Wt 92.5 kg (204 lb)   LMP 06/10/2019   SpO2 94%   BMI 38.55 kg/m²             MDM   Patient was evaluated in the ER for lethargy, increased swelling to left side of the head with drainage.  Patient is hemodynamically stable and nontoxic-appearing on exam.  She had an intracranial hemorrhage and has had craniectomy and multiple cranioplasties since then due to infection.  Patient was diagnosed with a CSF leak which neurosurgery is already aware of.  However,  became worried due to increased swelling and pain.  Labs here are unremarkable.  Urinalysis is not indicative of an infectious process.  CT of head as reported above reveals findings concerning for CSF leak.  Spoke with [facility] neurosurgery as discussed above who recommended an LP.  LP was not indicative of meningitis.  They would like to see the patient for prompt outpatient follow-up.  Facesheet has been sent and they are supposed to contact the patient regarding an appointment.  Patient and  are agreeable with this plan.  They were advised to return to the ER for new or worsening symptoms or acute concerns.    Final diagnoses:   CSF leak       ED Disposition  ED Disposition     ED Disposition   Discharge    Condition   Stable    Comment   --             Perry Wilburn MD  [address] [address]  [phone]    Schedule an appointment as soon as possible for a visit   for further outpatient evaluation of CSF leak    [facility] Emergency Department  [address] [address] [phone]  Go to   As needed, If symptoms worsen         Medication List      No changes were made to your prescriptions during this visit.          Geetha Nielsen PA-C  11/06/22 2027    "

## 2022-11-07 ENCOUNTER — TRANSITIONAL CARE MANAGEMENT TELEPHONE ENCOUNTER (OUTPATIENT)
Dept: CALL CENTER | Facility: HOSPITAL | Age: 47
End: 2022-11-07

## 2022-11-07 NOTE — DISCHARGE INSTRUCTIONS
Follow-up with neurosurgeon for further outpatient evaluation of CSF leak.  Return to the ER for new or worsening symptoms or acute concerns.

## 2022-11-07 NOTE — PROCEDURES
Lumbar puncture procedure note: Indication for procedure is to rule out meningitis with known a CSF fluid leak at request of [facility].  Consent was signed after discussion of the risks and benefits.  Patient was placed in an upright position leaning over a tray.  The L3/L4 vertebral space was located.  Area was cleansed with iodine scrub.  Sterile gloves and instruments were used for the procedure.  Using 1% lidocaine approximately 5 cc used for local anesthetic.  Using a 22-gauge lumbar needle a lumbar puncture was performed.  A clear CSF was obtained.  Patient tolerated the procedure well.  A Band-Aid was placed after and patient was placed in a supine position.

## 2022-11-07 NOTE — OUTREACH NOTE
Call Center TCM Note    Flowsheet Row Responses   [facility] patient discharged from? [facility]   Does the patient have one of the following disease processes/diagnoses(primary or secondary)? Other   TCM attempt successful? Yes   Call start time 1551   Call end time 1604   Discharge diagnosis Influenza A   Is patient permission given to speak with other caregiver? Yes   List who call center can speak with    Person spoke with today (if not patient) and relationship    Meds reviewed with patient/caregiver? Yes   Is the patient having any side effects they believe may be caused by any medication additions or changes? No   Does the patient have all medications ordered at discharge? Yes   Is the patient taking all medications as directed (includes completed medication regime)? Yes   Comments Pt reports her PCP is Ana Remy at [facility], she hasp hosp f/u 11-8-22.   Does the patient have an appointment with their PCP within 7 days of discharge? Yes   Psychosocial issues? No   Comments Per  pt's left eye & cheek were swollen this morning, improved throughout the day.  reports no redness, no pain, no rash or temp changes. Denies SOA/CHARLIE, no cough or fever,Appetite is WNL, no issues voiding or N/V/D. Had spinal tap in ER 11-6-22, puncture site covered on back w/bandaid,  denies s/sx of inf   Did the patient receive a copy of their discharge instructions? Yes   Nursing interventions Reviewed instructions with patient   What is the patient's perception of their health status since discharge? New symptoms unrelated to diagnosis   Is the patient/caregiver able to teach back signs and symptoms related to disease process for when to call PCP? Yes   Is the patient/caregiver able to teach back signs and symptoms related to disease process for when to call 911? Yes   Is the patient/caregiver able to teach back the hierarchy of who to call/visit for symptoms/problems? PCP,  ORIF BIMALLEOLAR FRACTURE RIGHT 8/16  Last refill 8/17  50 tablets  1-2 tablets Q 3 hours PRN last written  [facility]  PDMP reviewed      Specialist, Home health nurse, Urgent Care, ED, 911 Yes   If the patient is a current smoker, are they able to teach back resources for cessation? --  [1/2 pk/day.]   TCM call completed? Yes   Call end time 1604   Would this patient benefit from a Referral to [facility] Social Work? No   Is the patient interested in additional calls from an ambulatory ?  NOTE:  applies to high risk patients requiring additional follow-up. No          Samanta Lindquist RN    11/7/2022, 16:05 EST

## 2022-11-07 NOTE — OUTREACH NOTE
Call Center TCM Note    Flowsheet Row Responses   [facility] patient discharged from? [facility]   Does the patient have one of the following disease processes/diagnoses(primary or secondary)? Other   TCM attempt successful? No  [pt has h/o dysarthria, unable tospeak]   Unsuccessful attempts Attempt 1   Call Status Voice mail issues          Samanta Lindquist RN    11/7/2022, 10:52 EST

## 2022-11-09 LAB
BACTERIA SPEC AEROBE CULT: NORMAL
BACTERIA SPEC AEROBE CULT: NORMAL
HSV1 DNA CSF QL NAA+PROBE: NEGATIVE
HSV2 DNA CSF QL NAA+PROBE: NEGATIVE

## 2022-11-10 LAB
BACTERIA SPEC AEROBE CULT: NORMAL
EV RNA SPEC QL NAA+PROBE: NEGATIVE
GRAM STN SPEC: NORMAL

## 2022-11-11 LAB
BACTERIA SPEC AEROBE CULT: NORMAL
BACTERIA SPEC AEROBE CULT: NORMAL

## 2022-11-27 ENCOUNTER — HOSPITAL ENCOUNTER (EMERGENCY)
Facility: HOSPITAL | Age: 47
Discharge: ANOTHER HOSPITAL | End: 2022-11-27
Attending: EMERGENCY MEDICINE | Admitting: EMERGENCY MEDICINE

## 2022-11-27 ENCOUNTER — APPOINTMENT (OUTPATIENT)
Dept: CT IMAGING | Facility: HOSPITAL | Age: 47
End: 2022-11-27

## 2022-11-27 VITALS
SYSTOLIC BLOOD PRESSURE: 105 MMHG | WEIGHT: 204 LBS | HEART RATE: 74 BPM | RESPIRATION RATE: 18 BRPM | HEIGHT: 61 IN | BODY MASS INDEX: 38.51 KG/M2 | OXYGEN SATURATION: 97 % | TEMPERATURE: 98.1 F | DIASTOLIC BLOOD PRESSURE: 62 MMHG

## 2022-11-27 DIAGNOSIS — Z98.2 S/P VP SHUNT: ICD-10-CM

## 2022-11-27 DIAGNOSIS — R41.82 ALTERED MENTAL STATUS, UNSPECIFIED ALTERED MENTAL STATUS TYPE: Primary | ICD-10-CM

## 2022-11-27 LAB
ALBUMIN SERPL-MCNC: 3.5 G/DL (ref 3.5–5.2)
ALBUMIN/GLOB SERPL: 0.9 G/DL
ALP SERPL-CCNC: 106 U/L (ref 39–117)
ALT SERPL W P-5'-P-CCNC: 24 U/L (ref 1–33)
ANION GAP SERPL CALCULATED.3IONS-SCNC: 18 MMOL/L (ref 5–15)
AST SERPL-CCNC: 20 U/L (ref 1–32)
BASOPHILS # BLD AUTO: 0.08 10*3/MM3 (ref 0–0.2)
BASOPHILS NFR BLD AUTO: 0.8 % (ref 0–1.5)
BILIRUB SERPL-MCNC: 0.3 MG/DL (ref 0–1.2)
BUN SERPL-MCNC: 39 MG/DL (ref 6–20)
BUN/CREAT SERPL: 9.8 (ref 7–25)
CALCIUM SPEC-SCNC: 9 MG/DL (ref 8.6–10.5)
CHLORIDE SERPL-SCNC: 103 MMOL/L (ref 98–107)
CO2 SERPL-SCNC: 19 MMOL/L (ref 22–29)
CREAT SERPL-MCNC: 3.97 MG/DL (ref 0.57–1)
DEPRECATED RDW RBC AUTO: 49.9 FL (ref 37–54)
EGFRCR SERPLBLD CKD-EPI 2021: 13.2 ML/MIN/1.73
EOSINOPHIL # BLD AUTO: 0.3 10*3/MM3 (ref 0–0.4)
EOSINOPHIL NFR BLD AUTO: 2.9 % (ref 0.3–6.2)
ERYTHROCYTE [DISTWIDTH] IN BLOOD BY AUTOMATED COUNT: 14.6 % (ref 12.3–15.4)
GLOBULIN UR ELPH-MCNC: 3.8 GM/DL
GLUCOSE SERPL-MCNC: 102 MG/DL (ref 65–99)
HCT VFR BLD AUTO: 31.7 % (ref 34–46.6)
HGB BLD-MCNC: 9.9 G/DL (ref 12–15.9)
IMM GRANULOCYTES # BLD AUTO: 0.05 10*3/MM3 (ref 0–0.05)
IMM GRANULOCYTES NFR BLD AUTO: 0.5 % (ref 0–0.5)
LYMPHOCYTES # BLD AUTO: 1.5 10*3/MM3 (ref 0.7–3.1)
LYMPHOCYTES NFR BLD AUTO: 14.7 % (ref 19.6–45.3)
MCH RBC QN AUTO: 28.9 PG (ref 26.6–33)
MCHC RBC AUTO-ENTMCNC: 31.2 G/DL (ref 31.5–35.7)
MCV RBC AUTO: 92.7 FL (ref 79–97)
MONOCYTES # BLD AUTO: 0.79 10*3/MM3 (ref 0.1–0.9)
MONOCYTES NFR BLD AUTO: 7.7 % (ref 5–12)
NEUTROPHILS NFR BLD AUTO: 7.5 10*3/MM3 (ref 1.7–7)
NEUTROPHILS NFR BLD AUTO: 73.4 % (ref 42.7–76)
NRBC BLD AUTO-RTO: 0 /100 WBC (ref 0–0.2)
PLATELET # BLD AUTO: 181 10*3/MM3 (ref 140–450)
PMV BLD AUTO: 10.7 FL (ref 6–12)
POTASSIUM SERPL-SCNC: 4.6 MMOL/L (ref 3.5–5.2)
PROT SERPL-MCNC: 7.3 G/DL (ref 6–8.5)
RBC # BLD AUTO: 3.42 10*6/MM3 (ref 3.77–5.28)
SODIUM SERPL-SCNC: 140 MMOL/L (ref 136–145)
WBC NRBC COR # BLD: 10.22 10*3/MM3 (ref 3.4–10.8)

## 2022-11-27 PROCEDURE — 70450 CT HEAD/BRAIN W/O DYE: CPT

## 2022-11-27 PROCEDURE — 85025 COMPLETE CBC W/AUTO DIFF WBC: CPT | Performed by: NURSE PRACTITIONER

## 2022-11-27 PROCEDURE — 80053 COMPREHEN METABOLIC PANEL: CPT | Performed by: NURSE PRACTITIONER

## 2022-11-27 PROCEDURE — 99285 EMERGENCY DEPT VISIT HI MDM: CPT

## 2022-11-27 PROCEDURE — 99284 EMERGENCY DEPT VISIT MOD MDM: CPT

## 2022-11-27 RX ORDER — SODIUM CHLORIDE 0.9 % (FLUSH) 0.9 %
10 SYRINGE (ML) INJECTION AS NEEDED
Status: DISCONTINUED | OUTPATIENT
Start: 2022-11-27 | End: 2022-11-27 | Stop reason: HOSPADM

## 2022-12-30 ENCOUNTER — APPOINTMENT (OUTPATIENT)
Dept: CT IMAGING | Facility: HOSPITAL | Age: 47
End: 2022-12-30
Payer: MEDICAID

## 2022-12-30 ENCOUNTER — APPOINTMENT (OUTPATIENT)
Dept: GENERAL RADIOLOGY | Facility: HOSPITAL | Age: 47
End: 2022-12-30
Payer: MEDICAID

## 2022-12-30 ENCOUNTER — HOSPITAL ENCOUNTER (EMERGENCY)
Facility: HOSPITAL | Age: 47
Discharge: ANOTHER HOSPITAL | End: 2022-12-30
Attending: STUDENT IN AN ORGANIZED HEALTH CARE EDUCATION/TRAINING PROGRAM | Admitting: STUDENT IN AN ORGANIZED HEALTH CARE EDUCATION/TRAINING PROGRAM
Payer: MEDICAID

## 2022-12-30 VITALS
HEIGHT: 61 IN | TEMPERATURE: 98.1 F | OXYGEN SATURATION: 97 % | WEIGHT: 204 LBS | RESPIRATION RATE: 12 BRPM | HEART RATE: 92 BPM | SYSTOLIC BLOOD PRESSURE: 102 MMHG | BODY MASS INDEX: 38.51 KG/M2 | DIASTOLIC BLOOD PRESSURE: 82 MMHG

## 2022-12-30 DIAGNOSIS — R41.82 ALTERED MENTAL STATUS, UNSPECIFIED ALTERED MENTAL STATUS TYPE: Primary | ICD-10-CM

## 2022-12-30 LAB
ALBUMIN SERPL-MCNC: 3.9 G/DL (ref 3.5–5.2)
ALBUMIN/GLOB SERPL: 1 G/DL
ALP SERPL-CCNC: 102 U/L (ref 39–117)
ALT SERPL W P-5'-P-CCNC: 15 U/L (ref 1–33)
ANION GAP SERPL CALCULATED.3IONS-SCNC: 14.8 MMOL/L (ref 5–15)
APTT PPP: 37 SECONDS (ref 70–100)
AST SERPL-CCNC: 14 U/L (ref 1–32)
BASOPHILS # BLD AUTO: 0.11 10*3/MM3 (ref 0–0.2)
BASOPHILS NFR BLD AUTO: 1.4 % (ref 0–1.5)
BILIRUB SERPL-MCNC: 0.4 MG/DL (ref 0–1.2)
BILIRUB UR QL STRIP: NEGATIVE
BUN SERPL-MCNC: 44 MG/DL (ref 6–20)
BUN/CREAT SERPL: 12.7 (ref 7–25)
CALCIUM SPEC-SCNC: 9.4 MG/DL (ref 8.6–10.5)
CHLORIDE SERPL-SCNC: 102 MMOL/L (ref 98–107)
CK SERPL-CCNC: 275 U/L (ref 20–180)
CLARITY UR: ABNORMAL
CO2 SERPL-SCNC: 18.2 MMOL/L (ref 22–29)
COLOR UR: ABNORMAL
CREAT SERPL-MCNC: 3.47 MG/DL (ref 0.57–1)
CRP SERPL-MCNC: 6.38 MG/DL (ref 0–0.5)
D-LACTATE SERPL-SCNC: 1.4 MMOL/L (ref 0.5–2)
DEPRECATED RDW RBC AUTO: 46.2 FL (ref 37–54)
EGFRCR SERPLBLD CKD-EPI 2021: 15.6 ML/MIN/1.73
EOSINOPHIL # BLD AUTO: 0.07 10*3/MM3 (ref 0–0.4)
EOSINOPHIL NFR BLD AUTO: 0.9 % (ref 0.3–6.2)
ERYTHROCYTE [DISTWIDTH] IN BLOOD BY AUTOMATED COUNT: 14.5 % (ref 12.3–15.4)
FLUAV RNA RESP QL NAA+PROBE: NOT DETECTED
FLUBV RNA RESP QL NAA+PROBE: NOT DETECTED
GLOBULIN UR ELPH-MCNC: 4 GM/DL
GLUCOSE BLDC GLUCOMTR-MCNC: 127 MG/DL (ref 70–130)
GLUCOSE SERPL-MCNC: 102 MG/DL (ref 65–99)
GLUCOSE UR STRIP-MCNC: NEGATIVE MG/DL
HCT VFR BLD AUTO: 34.1 % (ref 34–46.6)
HGB BLD-MCNC: 10.9 G/DL (ref 12–15.9)
HGB UR QL STRIP.AUTO: NEGATIVE
HOLD SPECIMEN: NORMAL
HOLD SPECIMEN: NORMAL
IMM GRANULOCYTES # BLD AUTO: 0.03 10*3/MM3 (ref 0–0.05)
IMM GRANULOCYTES NFR BLD AUTO: 0.4 % (ref 0–0.5)
INR PPP: 1.15 (ref 0.9–1.1)
KETONES UR QL STRIP: NEGATIVE
LEUKOCYTE ESTERASE UR QL STRIP.AUTO: NEGATIVE
LYMPHOCYTES # BLD AUTO: 1.83 10*3/MM3 (ref 0.7–3.1)
LYMPHOCYTES NFR BLD AUTO: 22.9 % (ref 19.6–45.3)
MAGNESIUM SERPL-MCNC: 2.3 MG/DL (ref 1.6–2.6)
MCH RBC QN AUTO: 27.5 PG (ref 26.6–33)
MCHC RBC AUTO-ENTMCNC: 32 G/DL (ref 31.5–35.7)
MCV RBC AUTO: 86.1 FL (ref 79–97)
MONOCYTES # BLD AUTO: 0.88 10*3/MM3 (ref 0.1–0.9)
MONOCYTES NFR BLD AUTO: 11 % (ref 5–12)
NEUTROPHILS NFR BLD AUTO: 5.06 10*3/MM3 (ref 1.7–7)
NEUTROPHILS NFR BLD AUTO: 63.4 % (ref 42.7–76)
NITRITE UR QL STRIP: NEGATIVE
NRBC BLD AUTO-RTO: 0 /100 WBC (ref 0–0.2)
PH UR STRIP.AUTO: <=5 [PH] (ref 5–8)
PHOSPHATE SERPL-MCNC: 6.1 MG/DL (ref 2.5–4.5)
PLATELET # BLD AUTO: 375 10*3/MM3 (ref 140–450)
PMV BLD AUTO: 10.4 FL (ref 6–12)
POTASSIUM SERPL-SCNC: 5.3 MMOL/L (ref 3.5–5.2)
PROCALCITONIN SERPL-MCNC: 0.08 NG/ML (ref 0–0.25)
PROT SERPL-MCNC: 7.9 G/DL (ref 6–8.5)
PROT UR QL STRIP: ABNORMAL
PROTHROMBIN TIME: 15.1 SECONDS (ref 12.5–14.5)
RBC # BLD AUTO: 3.96 10*6/MM3 (ref 3.77–5.28)
SARS-COV-2 RNA RESP QL NAA+PROBE: NOT DETECTED
SODIUM SERPL-SCNC: 135 MMOL/L (ref 136–145)
SP GR UR STRIP: 1.02 (ref 1–1.03)
TROPONIN T SERPL-MCNC: 0.02 NG/ML (ref 0–0.03)
UROBILINOGEN UR QL STRIP: ABNORMAL
VALPROATE SERPL-MCNC: <2.8 MCG/ML (ref 50–125)
WBC NRBC COR # BLD: 7.98 10*3/MM3 (ref 3.4–10.8)
WHOLE BLOOD HOLD COAG: NORMAL
WHOLE BLOOD HOLD SPECIMEN: NORMAL

## 2022-12-30 PROCEDURE — 85025 COMPLETE CBC W/AUTO DIFF WBC: CPT | Performed by: STUDENT IN AN ORGANIZED HEALTH CARE EDUCATION/TRAINING PROGRAM

## 2022-12-30 PROCEDURE — 80053 COMPREHEN METABOLIC PANEL: CPT | Performed by: STUDENT IN AN ORGANIZED HEALTH CARE EDUCATION/TRAINING PROGRAM

## 2022-12-30 PROCEDURE — 36415 COLL VENOUS BLD VENIPUNCTURE: CPT

## 2022-12-30 PROCEDURE — 71046 X-RAY EXAM CHEST 2 VIEWS: CPT

## 2022-12-30 PROCEDURE — 84145 PROCALCITONIN (PCT): CPT | Performed by: STUDENT IN AN ORGANIZED HEALTH CARE EDUCATION/TRAINING PROGRAM

## 2022-12-30 PROCEDURE — 85610 PROTHROMBIN TIME: CPT | Performed by: STUDENT IN AN ORGANIZED HEALTH CARE EDUCATION/TRAINING PROGRAM

## 2022-12-30 PROCEDURE — 84484 ASSAY OF TROPONIN QUANT: CPT | Performed by: STUDENT IN AN ORGANIZED HEALTH CARE EDUCATION/TRAINING PROGRAM

## 2022-12-30 PROCEDURE — 84100 ASSAY OF PHOSPHORUS: CPT | Performed by: STUDENT IN AN ORGANIZED HEALTH CARE EDUCATION/TRAINING PROGRAM

## 2022-12-30 PROCEDURE — 70498 CT ANGIOGRAPHY NECK: CPT

## 2022-12-30 PROCEDURE — 71045 X-RAY EXAM CHEST 1 VIEW: CPT

## 2022-12-30 PROCEDURE — 86140 C-REACTIVE PROTEIN: CPT | Performed by: STUDENT IN AN ORGANIZED HEALTH CARE EDUCATION/TRAINING PROGRAM

## 2022-12-30 PROCEDURE — 99285 EMERGENCY DEPT VISIT HI MDM: CPT

## 2022-12-30 PROCEDURE — 25010000002 IOPAMIDOL 61 % SOLUTION: Performed by: STUDENT IN AN ORGANIZED HEALTH CARE EDUCATION/TRAINING PROGRAM

## 2022-12-30 PROCEDURE — 93005 ELECTROCARDIOGRAM TRACING: CPT | Performed by: STUDENT IN AN ORGANIZED HEALTH CARE EDUCATION/TRAINING PROGRAM

## 2022-12-30 PROCEDURE — 80164 ASSAY DIPROPYLACETIC ACD TOT: CPT | Performed by: STUDENT IN AN ORGANIZED HEALTH CARE EDUCATION/TRAINING PROGRAM

## 2022-12-30 PROCEDURE — P9612 CATHETERIZE FOR URINE SPEC: HCPCS

## 2022-12-30 PROCEDURE — 87040 BLOOD CULTURE FOR BACTERIA: CPT | Performed by: STUDENT IN AN ORGANIZED HEALTH CARE EDUCATION/TRAINING PROGRAM

## 2022-12-30 PROCEDURE — 70250 X-RAY EXAM OF SKULL: CPT

## 2022-12-30 PROCEDURE — 25010000002 LEVETRIRACETAM PER 10 MG: Performed by: STUDENT IN AN ORGANIZED HEALTH CARE EDUCATION/TRAINING PROGRAM

## 2022-12-30 PROCEDURE — 70496 CT ANGIOGRAPHY HEAD: CPT

## 2022-12-30 PROCEDURE — 87636 SARSCOV2 & INF A&B AMP PRB: CPT | Performed by: STUDENT IN AN ORGANIZED HEALTH CARE EDUCATION/TRAINING PROGRAM

## 2022-12-30 PROCEDURE — 83605 ASSAY OF LACTIC ACID: CPT | Performed by: STUDENT IN AN ORGANIZED HEALTH CARE EDUCATION/TRAINING PROGRAM

## 2022-12-30 PROCEDURE — 85730 THROMBOPLASTIN TIME PARTIAL: CPT | Performed by: STUDENT IN AN ORGANIZED HEALTH CARE EDUCATION/TRAINING PROGRAM

## 2022-12-30 PROCEDURE — 80177 DRUG SCRN QUAN LEVETIRACETAM: CPT | Performed by: STUDENT IN AN ORGANIZED HEALTH CARE EDUCATION/TRAINING PROGRAM

## 2022-12-30 PROCEDURE — 81003 URINALYSIS AUTO W/O SCOPE: CPT | Performed by: STUDENT IN AN ORGANIZED HEALTH CARE EDUCATION/TRAINING PROGRAM

## 2022-12-30 PROCEDURE — 96374 THER/PROPH/DIAG INJ IV PUSH: CPT

## 2022-12-30 PROCEDURE — 74018 RADEX ABDOMEN 1 VIEW: CPT

## 2022-12-30 PROCEDURE — 82962 GLUCOSE BLOOD TEST: CPT

## 2022-12-30 PROCEDURE — 83735 ASSAY OF MAGNESIUM: CPT | Performed by: STUDENT IN AN ORGANIZED HEALTH CARE EDUCATION/TRAINING PROGRAM

## 2022-12-30 PROCEDURE — 70450 CT HEAD/BRAIN W/O DYE: CPT

## 2022-12-30 PROCEDURE — 82550 ASSAY OF CK (CPK): CPT | Performed by: STUDENT IN AN ORGANIZED HEALTH CARE EDUCATION/TRAINING PROGRAM

## 2022-12-30 PROCEDURE — 0042T HC CT CEREBRAL PERFUSION W/WO CONTRAST: CPT

## 2022-12-30 RX ORDER — SODIUM CHLORIDE 0.9 % (FLUSH) 0.9 %
10 SYRINGE (ML) INJECTION AS NEEDED
Status: DISCONTINUED | OUTPATIENT
Start: 2022-12-30 | End: 2022-12-30 | Stop reason: HOSPADM

## 2022-12-30 RX ADMIN — LEVETIRACETAM 2000 MG: 500 INJECTION, SOLUTION, CONCENTRATE INTRAVENOUS at 14:21

## 2022-12-30 RX ADMIN — IOPAMIDOL 100 ML: 612 INJECTION, SOLUTION INTRAVENOUS at 12:19

## 2022-12-30 RX ADMIN — IOPAMIDOL 50 ML: 612 INJECTION, SOLUTION INTRAVENOUS at 12:18

## 2022-12-30 NOTE — ED PROVIDER NOTES
Subjective  History of Present Illness:    Chief Complaint: Altered mental status  History of Present Illness: Patient is a 47-year-old female with history of TBI, epilepsy secondary to this as well as  shunt which has had to undergo several revisions who presents today with altered mental status.  Recently admitted to [facility] for altered mental status ultimately requiring intubation for GCS of less than 8.  Believed to be secondary to underlying seizure disorder.  Loaded with Keppra at that time.  Found to have urinary tract infection leading to sepsis.  Thought to be the etiology of patient's altered mental status.  Discharged several days ago.  Father states that she had been in her normal state of health which is nonverbal and with right-sided paralysis but responsive to external stimuli and able to move her left side.  States that this morning, he went to arouse her and she was unresponsive.  He called her neurosurgeon at the [facility] and was told to present to the nearest emergency department for evaluation.  Here, patient is afebrile.  Father states that she has not had any fever or chills at home.  No nausea, vomiting, diarrhea.  Denies cough or shortness of breath.  States that she had been behaving to her baseline this morning.      Nurses Notes reviewed and agree, including vitals, allergies, social history and prior medical history.     REVIEW OF SYSTEMS: All systems reviewed and not pertinent unless noted.  Review of Systems   Unable to perform ROS: Acuity of condition       Past Medical History:   Diagnosis Date   • COPD (chronic obstructive pulmonary disease) (HCC)    • Hypertension    • Paralysis due to old stroke (HCC)     right sided   • Stroke (HCC)    • TBI (traumatic brain injury)        Allergies:    Codeine and Penicillins      Past Surgical History:   Procedure Laterality Date   • BRAIN SURGERY     • TUBAL ABDOMINAL LIGATION           Social  History     Socioeconomic History   • Marital status:    Tobacco Use   • Smoking status: Unknown   Vaping Use   • Vaping Use: Unknown   Substance and Sexual Activity   • Alcohol use: No   • Drug use: No   • Sexual activity: Defer         History reviewed. No pertinent family history.    Objective  Physical Exam:  /87 (Patient Position: Sitting)   Pulse 95   Temp 98.1 °F (36.7 °C) (Oral)   Resp 16   Ht 154.9 cm (61\")   Wt 92.5 kg (204 lb)   LMP 06/10/2019   SpO2 97%   BMI 38.55 kg/m²      Physical Exam  Constitutional:       General: She is not in acute distress.     Appearance: Normal appearance. She is well-developed. She is obese. She is ill-appearing.   HENT:      Head: Normocephalic.      Comments: Surgical scars to the right side of the head, no new trauma noted     Nose: Nose normal. No congestion or rhinorrhea.      Mouth/Throat:      Mouth: Mucous membranes are moist.      Pharynx: Oropharynx is clear. No oropharyngeal exudate or posterior oropharyngeal erythema.   Eyes:      Conjunctiva/sclera: Conjunctivae normal.      Pupils: Pupils are equal, round, and reactive to light.   Cardiovascular:      Rate and Rhythm: Normal rate and regular rhythm.      Pulses: Normal pulses.      Heart sounds: Normal heart sounds.   Pulmonary:      Effort: Pulmonary effort is normal. No respiratory distress.      Breath sounds: Normal breath sounds.   Abdominal:      General: Abdomen is flat. Bowel sounds are normal. There is no distension.      Palpations: Abdomen is soft.      Tenderness: There is no abdominal tenderness.   Musculoskeletal:         General: No swelling or tenderness. Normal range of motion.      Cervical back: Normal range of motion and neck supple.   Skin:     General: Skin is warm and dry.      Capillary Refill: Capillary refill takes less than 2 seconds.   Neurological:      General: No focal deficit present.      Mental Status: She is lethargic, disoriented and confused.      GCS:  GCS eye subscore is 2. GCS verbal subscore is 2. GCS motor subscore is 5.      Cranial Nerves: No cranial nerve deficit.      Motor: Weakness present.      Comments: Patient with GCS of 9.  Not responding to verbal direction.  Will grab hand and move away from chest with the left hand.  Not moving the right hand but this is consistent with the patient's history per father at bedside.   Psychiatric:         Thought Content: Thought content normal.         Judgment: Judgment normal.      Comments: Unable to assess secondary to the patient's nonverbal status.         Procedures    ED Course:    ED Course as of 12/30/22 1514   Fri Dec 30, 2022   1149 EKG interpreted by myself.  Normal sinus rhythm, rate of 94, no concerning ST segment changes [JE]      ED Course User Index  [JE] Jose Maria Guaman MD       Lab Results (last 24 hours)     Procedure Component Value Units Date/Time    CBC & Differential [[id number]]  (Abnormal) Collected: 12/30/22 1117    Specimen: Blood Updated: 12/30/22 1123    Narrative:      The following orders were created for panel order CBC & Differential.  Procedure                               Abnormality         Status                     ---------                               -----------         ------                     CBC Auto Differential[[id number]]        Abnormal            Final result                 Please view results for these tests on the individual orders.    Magnesium [[id number]]  (Normal) Collected: 12/30/22 1117    Specimen: Blood Updated: 12/30/22 1144     Magnesium 2.3 mg/dL     CBC Auto Differential [[id number]]  (Abnormal) Collected: 12/30/22 1117    Specimen: Blood Updated: 12/30/22 1123     WBC 7.98 10*3/mm3      RBC 3.96 10*6/mm3      Hemoglobin 10.9 g/dL      Hematocrit 34.1 %      MCV 86.1 fL      MCH 27.5 pg      MCHC 32.0 g/dL      RDW 14.5 %      RDW-SD 46.2 fl      MPV 10.4 fL      Platelets 375 10*3/mm3      Neutrophil % 63.4 %      Lymphocyte % 22.9 %       Monocyte % 11.0 %      Eosinophil % 0.9 %      Basophil % 1.4 %      Immature Grans % 0.4 %      Neutrophils, Absolute 5.06 10*3/mm3      Lymphocytes, Absolute 1.83 10*3/mm3      Monocytes, Absolute 0.88 10*3/mm3      Eosinophils, Absolute 0.07 10*3/mm3      Basophils, Absolute 0.11 10*3/mm3      Immature Grans, Absolute 0.03 10*3/mm3      nRBC 0.0 /100 WBC     POC Glucose Once [[id number]]  (Normal) Collected: 12/30/22 1125    Specimen: Blood Updated: 12/30/22 1233     Glucose 127 mg/dL      Comment: Serial Number: [id number]:  [id number]       Protime-INR [[id number]]  (Abnormal) Collected: 12/30/22 1138    Specimen: Blood Updated: 12/30/22 1345     Protime 15.1 Seconds      INR 1.15    Narrative:      Suggested INR therapeutic range for stable oral anticoagulant therapy:    Low Intensity therapy:   1.5-2.0  Moderate Intensity therapy:   2.0-3.0  High Intensity therapy:   2.5-4.0    aPTT [[id number]]  (Abnormal) Collected: 12/30/22 1138    Specimen: Blood Updated: 12/30/22 1345     PTT 37.0 seconds     Urinalysis With Culture If Indicated - Indwelling Urethral Catheter [[id number]]  (Abnormal) Collected: 12/30/22 1153    Specimen: Urine from Indwelling Urethral Catheter Updated: 12/30/22 1200     Color, UA Dark Yellow     Appearance, UA Cloudy     pH, UA <=5.0     Specific Gravity, UA 1.023     Glucose, UA Negative     Ketones, UA Negative     Bilirubin, UA Negative     Blood, UA Negative     Protein, UA Trace     Leuk Esterase, UA Negative     Nitrite, UA Negative     Urobilinogen, UA 0.2 E.U./dL    Narrative:      In absence of clinical symptoms, the presence of pyuria, bacteria, and/or nitrites on the urinalysis result does not correlate with infection.  Urine microscopic not indicated.    COVID-19 and FLU A/B PCR - Swab, Nasopharynx [[id number]]  (Normal) Collected: 12/30/22 1156    Specimen: Swab from Nasopharynx Updated: 12/30/22 1225     COVID19 Not Detected     Influenza A PCR Not Detected     Influenza B  PCR Not Detected    Narrative:      Fact sheet for providers: https://www.fda.gov/media/037454/download    Fact sheet for patients: https://www.fda.gov/media/569888/download    Test performed by PCR.    Comprehensive Metabolic Panel [[id number]]  (Abnormal) Collected: 12/30/22 1249    Specimen: Blood Updated: 12/30/22 1347     Glucose 102 mg/dL      BUN 44 mg/dL      Creatinine 3.47 mg/dL      Sodium 135 mmol/L      Potassium 5.3 mmol/L      Chloride 102 mmol/L      CO2 18.2 mmol/L      Calcium 9.4 mg/dL      Total Protein 7.9 g/dL      Albumin 3.9 g/dL      ALT (SGPT) 15 U/L      AST (SGOT) 14 U/L      Alkaline Phosphatase 102 U/L      Total Bilirubin 0.4 mg/dL      Globulin 4.0 gm/dL      A/G Ratio 1.0 g/dL      BUN/Creatinine Ratio 12.7     Anion Gap 14.8 mmol/L      eGFR 15.6 mL/min/1.73      Comment: National Kidney Foundation and American Society of Nephrology (ASN) Task Force recommended calculation based on the Chronic Kidney Disease Epidemiology Collaboration (CKD-EPI) equation refit without adjustment for race.       Narrative:      GFR Normal >60  Chronic Kidney Disease <60  Kidney Failure <15      Troponin [[id number]]  (Normal) Collected: 12/30/22 1249    Specimen: Blood Updated: 12/30/22 1352     Troponin T 0.021 ng/mL     Narrative:      Troponin T Reference Range:  <= 0.03 ng/mL-   Negative for AMI  >0.03 ng/mL-     Abnormal for myocardial necrosis.  Clinicians would have to utilize clinical acumen, EKG, Troponin and serial changes to determine if it is an Acute Myocardial Infarction or myocardial injury due to an underlying chronic condition.       Results may be falsely decreased if patient taking Biotin.      Lactic Acid, Plasma [[id number]]  (Normal) Collected: 12/30/22 1249    Specimen: Blood Updated: 12/30/22 1346     Lactate 1.4 mmol/L     Procalcitonin [[id number]]  (Normal) Collected: 12/30/22 1249    Specimen: Blood Updated: 12/30/22 1406     Procalcitonin 0.08 ng/mL     Narrative:      As a  Marker for Sepsis (Non-Neonates):    1. <0.5 ng/mL represents a low risk of severe sepsis and/or septic shock.  2. >2 ng/mL represents a high risk of severe sepsis and/or septic shock.    As a Marker for Lower Respiratory Tract Infections that require antibiotic therapy:    PCT on Admission    Antibiotic Therapy       6-12 Hrs later    >0.5                Strongly Recommended  >0.25 - <0.5        Recommended   0.1 - 0.25          Discouraged              Remeasure/reassess PCT  <0.1                Strongly Discouraged     Remeasure/reassess PCT    As 28 day mortality risk marker: \"Change in Procalcitonin Result\" (>80% or <=80%) if Day 0 (or Day 1) and Day 4 values are available. Refer to http://www[facility]p[place]cal[place]    Change in PCT <=80%  A decrease of PCT levels below or equal to 80% defines a positive change in PCT test result representing a higher risk for 28-day all-cause mortality of patients diagnosed with severe sepsis for septic shock.    Change in PCT >80%  A decrease of PCT levels of more than 80% defines a negative change in PCT result representing a lower risk for 28-day all-cause mortality of patients diagnosed with severe sepsis or septic shock.       C-reactive Protein [[id number]]  (Abnormal) Collected: 12/30/22 1249    Specimen: Blood Updated: 12/30/22 1347     C-Reactive Protein 6.38 mg/dL     CK [[id number]]  (Abnormal) Collected: 12/30/22 1249    Specimen: Blood Updated: 12/30/22 1347     Creatine Kinase 275 U/L     Phosphorus [[id number]]  (Abnormal) Collected: 12/30/22 1249    Specimen: Blood Updated: 12/30/22 1347     Phosphorus 6.1 mg/dL     Valproic Acid Level, Total [[id number]]  (Abnormal) Collected: 12/30/22 1249    Specimen: Blood Updated: 12/30/22 1351     Valproic Acid <2.8 mcg/mL     Narrative:      Therapeutic Ranges for Valproic Acid    Epilepsy:       mcg/ml  Bipolar/Anjana  up to 125 mcg/ml      Blood Culture - Blood, Hand, Left [[id number]] Collected: 12/30/22 1300     Specimen: Blood from Hand, Left Updated: 12/30/22 1317    Blood Culture - Blood, Arm, Right [[id number]] Collected: 12/30/22 1306    Specimen: Blood from Arm, Right Updated: 12/30/22 1317    Levetiracetam Level (Keppra) [[id number]] Collected: 12/30/22 1335    Specimen: Blood Updated: 12/30/22 1335           XR Chest 2 View    Result Date: 12/30/2022  CLINICAL INDICATION:  Altered mental status evaluate for pneumonia  EXAMINATION TECHNIQUE: XR CHEST 2 VW-  COMPARISON: Chest radiograph dated 12/30/2022.  FINDINGS: Low lung volumes. Bilateral perihilar and bibasilar atelectasis. Mild bronchial wall thickening. Perihilar haziness. Heart and mediastinal contours are within normal limits. No acute osseous or soft tissue abnormality.  shunt catheter in place.      Impression: Hypoventilatory changes with bibasilar and perihilar atelectasis. Probable bronchitis. No dense focal airspace consolidation.    Images personally reviewed, interpreted and dictated by AMY Garcia.       This report was signed and finalized on 12/30/2022 12:48 PM by AMY Garcia.    CT Head Without Contrast    Result Date: 12/30/2022  CTA HEAD AND NECK WITH IV CONTRAST 12/30/2022 12:03 PM  HISTORY: Neuro deficit, acute, stroke suspected  altered mental status. History of epilepsy and TBI.  TECHNIQUE: Axial CT scan images were obtained from the skull base to the vertex without IV contrast. Following this, axial CTA images of the head and neck were performed following IV contrast administration in the arterial phase. Multiplanar reformatted images and 3D reconstructions of the head and neck were generated from the axial data set and provided for interpretation. This study was performed with techniques to keep radiation doses as low as reasonably achievable, (ALARA). Individualized dose reduction techniques using automated exposure control or adjustment of mA and/or kV according to the patient size were employed.  COMPARISON: CT head  dated 11/27/2022.  FINDINGS:  CT Head: Postoperative changes from left craniectomy with encephalomalacia of the left cerebral hemisphere involving posterior frontal lobe, parietal lobe and anterior temporal lobe. There is ex vacuo dilatation of the left lateral ventricle. There is interval remodeling of the left cerebral hemisphere with a bulging contour. Right frontal approach ventriculostomy catheter in place with the tip terminating near the septum pellucidum.  No acute intracranial hemorrhage or large acute cortical infarct. The remaining brain volume is normal for the patient's age. Remaining ventricles are normal in size and configuration. No midline shift. The basal cisterns are patent.  CTA Head: The major intracranial arterial system is patent without hemodynamically significant stenosis or major vessel occlusion. No aneurysm is identified.  CTA Neck: Aortic arch: Arch shows no significant narrowing. Great vessel origins are widely patent.  Right carotid: No significant atherosclerotic plaque is seen of the cervical common or internal carotid artery. 0% stenosis according to the NASCET criteria.  Left carotid: No significant atherosclerotic plaque is seen of the cervical common or internal carotid artery. 0% stenosis according to the NASCET criteria.  Vertebrals: The vertebral arteries are patent. Dominant right vertebral artery. Diminutive caliber left vertebral artery. No significant stenosis is present.  Other findings: Ventriculoperitoneal shunt tubing in the right neck.      Impression: Redemonstration of postoperative changes from left craniectomy and chronic encephalomalacia of the left cerebral hemisphere. There is interval remodeling of the left cerebral hemisphere with bulging contour and ex vacuo dilatation of the left lateral ventricle. Findings are concerning for possible ventriculoperitoneal shunt malfunction. Correlate clinically.  No acute intracranial hemorrhage or large cortical infarct.   No evidence of vascular injury, aneurysm, hemodynamically significant stenosis or major vessel occlusion of the intracranial arterial system.  0% stenosis of the carotid bulbs by NASCET criteria.  Patent vertebral arteries.    Images personally reviewed, interpreted and dictated by AMY Garcia.       Images personally reviewed, interpreted and dictated by AMY Garcia.     3764.83 mGy.cm   This study was performed with techniques to keep radiation doses as low as reasonably achievable (ALARA). Individualized dose reduction techniques using automated exposure control or adjustment of mA and/or kV according to the patient size were employed.       HEAD CT PERFUSION WITH CONTRAST    12/30/2022 12:03 PM  HISTORY: CVA.  COMPARISON: None.  TECHNIQUE: Multiple axial CT images were performed from the foramen magnum to the vertex. Limited dynamic postcontrast images were obtained. Calculations of cerebral blood flow, mean transit time, cerebral blood volume were performed and evaluated. This study was performed with techniques to keep radiation doses as low as reasonably achievable, (ALARA). Individualized dose reduction techniques using automated exposure control or adjustment of mA and/or kV according to the patient size were employed.  FINDINGS: Cerebral blood flow, mean transit time, cerebral blood volume demonstrate no evidence of large core infarct or penumbra.  IMPRESSION: No large vessel occlusion.     3764.83 mGy.cm   This study was performed with techniques to keep radiation doses as low as reasonably achievable (ALARA). Individualized dose reduction techniques using automated exposure control or adjustment of mA and/or kV according to the patient size were employed.       This report was signed and finalized on 12/30/2022 12:36 PM by AMY Garcia.    CT Angiogram Neck    Result Date: 12/30/2022  CTA HEAD AND NECK WITH IV CONTRAST 12/30/2022 12:03 PM  HISTORY: Neuro deficit, acute,  stroke suspected  altered mental status. History of epilepsy and TBI.  TECHNIQUE: Axial CT scan images were obtained from the skull base to the vertex without IV contrast. Following this, axial CTA images of the head and neck were performed following IV contrast administration in the arterial phase. Multiplanar reformatted images and 3D reconstructions of the head and neck were generated from the axial data set and provided for interpretation. This study was performed with techniques to keep radiation doses as low as reasonably achievable, (ALARA). Individualized dose reduction techniques using automated exposure control or adjustment of mA and/or kV according to the patient size were employed.  COMPARISON: CT head dated 11/27/2022.  FINDINGS:  CT Head: Postoperative changes from left craniectomy with encephalomalacia of the left cerebral hemisphere involving posterior frontal lobe, parietal lobe and anterior temporal lobe. There is ex vacuo dilatation of the left lateral ventricle. There is interval remodeling of the left cerebral hemisphere with a bulging contour. Right frontal approach ventriculostomy catheter in place with the tip terminating near the septum pellucidum.  No acute intracranial hemorrhage or large acute cortical infarct. The remaining brain volume is normal for the patient's age. Remaining ventricles are normal in size and configuration. No midline shift. The basal cisterns are patent.  CTA Head: The major intracranial arterial system is patent without hemodynamically significant stenosis or major vessel occlusion. No aneurysm is identified.  CTA Neck: Aortic arch: Arch shows no significant narrowing. Great vessel origins are widely patent.  Right carotid: No significant atherosclerotic plaque is seen of the cervical common or internal carotid artery. 0% stenosis according to the NASCET criteria.  Left carotid: No significant atherosclerotic plaque is seen of the cervical common or internal  carotid artery. 0% stenosis according to the NASCET criteria.  Vertebrals: The vertebral arteries are patent. Dominant right vertebral artery. Diminutive caliber left vertebral artery. No significant stenosis is present.  Other findings: Ventriculoperitoneal shunt tubing in the right neck.      Impression: Redemonstration of postoperative changes from left craniectomy and chronic encephalomalacia of the left cerebral hemisphere. There is interval remodeling of the left cerebral hemisphere with bulging contour and ex vacuo dilatation of the left lateral ventricle. Findings are concerning for possible ventriculoperitoneal shunt malfunction. Correlate clinically.  No acute intracranial hemorrhage or large cortical infarct.  No evidence of vascular injury, aneurysm, hemodynamically significant stenosis or major vessel occlusion of the intracranial arterial system.  0% stenosis of the carotid bulbs by NASCET criteria.  Patent vertebral arteries.    Images personally reviewed, interpreted and dictated by AMY Garcia.       Images personally reviewed, interpreted and dictated by AMY Garcia.     3764.83 mGy.cm   This study was performed with techniques to keep radiation doses as low as reasonably achievable (ALARA). Individualized dose reduction techniques using automated exposure control or adjustment of mA and/or kV according to the patient size were employed.       HEAD CT PERFUSION WITH CONTRAST    12/30/2022 12:03 PM  HISTORY: CVA.  COMPARISON: None.  TECHNIQUE: Multiple axial CT images were performed from the foramen magnum to the vertex. Limited dynamic postcontrast images were obtained. Calculations of cerebral blood flow, mean transit time, cerebral blood volume were performed and evaluated. This study was performed with techniques to keep radiation doses as low as reasonably achievable, (ALARA). Individualized dose reduction techniques using automated exposure control or adjustment of mA  and/or kV according to the patient size were employed.  FINDINGS: Cerebral blood flow, mean transit time, cerebral blood volume demonstrate no evidence of large core infarct or penumbra.  IMPRESSION: No large vessel occlusion.     3764.83 mGy.cm   This study was performed with techniques to keep radiation doses as low as reasonably achievable (ALARA). Individualized dose reduction techniques using automated exposure control or adjustment of mA and/or kV according to the patient size were employed.       This report was signed and finalized on 12/30/2022 12:36 PM by AMY Garcia.    XR Chest 1 View    Result Date: 12/30/2022  CLINICAL INDICATION:  Weak/Dizzy/AMS triage protocol  EXAMINATION TECHNIQUE: XR CHEST 1 VW-  COMPARISON: Radiographs 11/04/2022.  FINDINGS: Mildly low lung volumes with bibasilar and perihilar atelectasis. Left lung basilar/retrocardiac opacity, likely atelectasis. No pneumothorax or pleural effusion. Heart and mediastinal contours are within normal limits. Right-sided ventriculoperitoneal shunt in place.      Impression: Hypoventilatory changes with bibasilar and perihilar atelectasis. Left lung basilar/retrocardiac opacity, likely atelectasis or developing airspace disease. Correlate clinically.  Images personally reviewed, interpreted and dictated by AMY Garcia.       This report was signed and finalized on 12/30/2022 11:30 AM by AMY Garcia.    CT Angiogram Head    Result Date: 12/30/2022  CTA HEAD AND NECK WITH IV CONTRAST 12/30/2022 12:03 PM  HISTORY: Neuro deficit, acute, stroke suspected  altered mental status. History of epilepsy and TBI.  TECHNIQUE: Axial CT scan images were obtained from the skull base to the vertex without IV contrast. Following this, axial CTA images of the head and neck were performed following IV contrast administration in the arterial phase. Multiplanar reformatted images and 3D reconstructions of the head and neck were generated  from the axial data set and provided for interpretation. This study was performed with techniques to keep radiation doses as low as reasonably achievable, (ALARA). Individualized dose reduction techniques using automated exposure control or adjustment of mA and/or kV according to the patient size were employed.  COMPARISON: CT head dated 11/27/2022.  FINDINGS:  CT Head: Postoperative changes from left craniectomy with encephalomalacia of the left cerebral hemisphere involving posterior frontal lobe, parietal lobe and anterior temporal lobe. There is ex vacuo dilatation of the left lateral ventricle. There is interval remodeling of the left cerebral hemisphere with a bulging contour. Right frontal approach ventriculostomy catheter in place with the tip terminating near the septum pellucidum.  No acute intracranial hemorrhage or large acute cortical infarct. The remaining brain volume is normal for the patient's age. Remaining ventricles are normal in size and configuration. No midline shift. The basal cisterns are patent.  CTA Head: The major intracranial arterial system is patent without hemodynamically significant stenosis or major vessel occlusion. No aneurysm is identified.  CTA Neck: Aortic arch: Arch shows no significant narrowing. Great vessel origins are widely patent.  Right carotid: No significant atherosclerotic plaque is seen of the cervical common or internal carotid artery. 0% stenosis according to the NASCET criteria.  Left carotid: No significant atherosclerotic plaque is seen of the cervical common or internal carotid artery. 0% stenosis according to the NASCET criteria.  Vertebrals: The vertebral arteries are patent. Dominant right vertebral artery. Diminutive caliber left vertebral artery. No significant stenosis is present.  Other findings: Ventriculoperitoneal shunt tubing in the right neck.      Impression: Redemonstration of postoperative changes from left craniectomy and chronic  encephalomalacia of the left cerebral hemisphere. There is interval remodeling of the left cerebral hemisphere with bulging contour and ex vacuo dilatation of the left lateral ventricle. Findings are concerning for possible ventriculoperitoneal shunt malfunction. Correlate clinically.  No acute intracranial hemorrhage or large cortical infarct.  No evidence of vascular injury, aneurysm, hemodynamically significant stenosis or major vessel occlusion of the intracranial arterial system.  0% stenosis of the carotid bulbs by NASCET criteria.  Patent vertebral arteries.    Images personally reviewed, interpreted and dictated by AMY Garcia.       Images personally reviewed, interpreted and dictated by AMY Garcia.     3764.83 mGy.cm   This study was performed with techniques to keep radiation doses as low as reasonably achievable (ALARA). Individualized dose reduction techniques using automated exposure control or adjustment of mA and/or kV according to the patient size were employed.       HEAD CT PERFUSION WITH CONTRAST    12/30/2022 12:03 PM  HISTORY: CVA.  COMPARISON: None.  TECHNIQUE: Multiple axial CT images were performed from the foramen magnum to the vertex. Limited dynamic postcontrast images were obtained. Calculations of cerebral blood flow, mean transit time, cerebral blood volume were performed and evaluated. This study was performed with techniques to keep radiation doses as low as reasonably achievable, (ALARA). Individualized dose reduction techniques using automated exposure control or adjustment of mA and/or kV according to the patient size were employed.  FINDINGS: Cerebral blood flow, mean transit time, cerebral blood volume demonstrate no evidence of large core infarct or penumbra.  IMPRESSION: No large vessel occlusion.     3764.83 mGy.cm   This study was performed with techniques to keep radiation doses as low as reasonably achievable (ALARA). Individualized dose reduction  techniques using automated exposure control or adjustment of mA and/or kV according to the patient size were employed.       This report was signed and finalized on 12/30/2022 12:36 PM by AMY Garcia.    CT CEREBRAL PERFUSION WITH & WITHOUT CONTRAST    Result Date: 12/30/2022  CTA HEAD AND NECK WITH IV CONTRAST 12/30/2022 12:03 PM  HISTORY: Neuro deficit, acute, stroke suspected  altered mental status. History of epilepsy and TBI.  TECHNIQUE: Axial CT scan images were obtained from the skull base to the vertex without IV contrast. Following this, axial CTA images of the head and neck were performed following IV contrast administration in the arterial phase. Multiplanar reformatted images and 3D reconstructions of the head and neck were generated from the axial data set and provided for interpretation. This study was performed with techniques to keep radiation doses as low as reasonably achievable, (ALARA). Individualized dose reduction techniques using automated exposure control or adjustment of mA and/or kV according to the patient size were employed.  COMPARISON: CT head dated 11/27/2022.  FINDINGS:  CT Head: Postoperative changes from left craniectomy with encephalomalacia of the left cerebral hemisphere involving posterior frontal lobe, parietal lobe and anterior temporal lobe. There is ex vacuo dilatation of the left lateral ventricle. There is interval remodeling of the left cerebral hemisphere with a bulging contour. Right frontal approach ventriculostomy catheter in place with the tip terminating near the septum pellucidum.  No acute intracranial hemorrhage or large acute cortical infarct. The remaining brain volume is normal for the patient's age. Remaining ventricles are normal in size and configuration. No midline shift. The basal cisterns are patent.  CTA Head: The major intracranial arterial system is patent without hemodynamically significant stenosis or major vessel occlusion. No aneurysm is  identified.  CTA Neck: Aortic arch: Arch shows no significant narrowing. Great vessel origins are widely patent.  Right carotid: No significant atherosclerotic plaque is seen of the cervical common or internal carotid artery. 0% stenosis according to the NASCET criteria.  Left carotid: No significant atherosclerotic plaque is seen of the cervical common or internal carotid artery. 0% stenosis according to the NASCET criteria.  Vertebrals: The vertebral arteries are patent. Dominant right vertebral artery. Diminutive caliber left vertebral artery. No significant stenosis is present.  Other findings: Ventriculoperitoneal shunt tubing in the right neck.      Impression: Redemonstration of postoperative changes from left craniectomy and chronic encephalomalacia of the left cerebral hemisphere. There is interval remodeling of the left cerebral hemisphere with bulging contour and ex vacuo dilatation of the left lateral ventricle. Findings are concerning for possible ventriculoperitoneal shunt malfunction. Correlate clinically.  No acute intracranial hemorrhage or large cortical infarct.  No evidence of vascular injury, aneurysm, hemodynamically significant stenosis or major vessel occlusion of the intracranial arterial system.  0% stenosis of the carotid bulbs by NASCET criteria.  Patent vertebral arteries.    Images personally reviewed, interpreted and dictated by AMY Garcia.       Images personally reviewed, interpreted and dictated by AMY Garcia.     3764.83 mGy.cm   This study was performed with techniques to keep radiation doses as low as reasonably achievable (ALARA). Individualized dose reduction techniques using automated exposure control or adjustment of mA and/or kV according to the patient size were employed.       HEAD CT PERFUSION WITH CONTRAST    12/30/2022 12:03 PM  HISTORY: CVA.  COMPARISON: None.  TECHNIQUE: Multiple axial CT images were performed from the foramen magnum to the  vertex. Limited dynamic postcontrast images were obtained. Calculations of cerebral blood flow, mean transit time, cerebral blood volume were performed and evaluated. This study was performed with techniques to keep radiation doses as low as reasonably achievable, (ALARA). Individualized dose reduction techniques using automated exposure control or adjustment of mA and/or kV according to the patient size were employed.  FINDINGS: Cerebral blood flow, mean transit time, cerebral blood volume demonstrate no evidence of large core infarct or penumbra.  IMPRESSION: No large vessel occlusion.     3764.83 mGy.cm   This study was performed with techniques to keep radiation doses as low as reasonably achievable (ALARA). Individualized dose reduction techniques using automated exposure control or adjustment of mA and/or kV according to the patient size were employed.       This report was signed and finalized on 12/30/2022 12:36 PM by AMY Garcia.    XR Shunt Series    Result Date: 12/30/2022  CLINICAL INDICATION:  Altered mental status, evaluate for ventriculoperitoneal shunt  EXAMINATION TECHNIQUE: XR SHUNT SERIES-  COMPARISON: None.  FINDINGS: Right frontal approach ventriculostomy peritoneal catheter coursing over the right side of the neck, right anterior chest, and terminates in the right hemiabdomen and pelvis. No discontinuity or kinking of the tubing identified.  Brennan catheter in place. IVC filter in place. Residual contrast material in the bilateral renal collecting systems and urinary bladder. Nonobstructive bowel gas pattern.      Impression: No discontinuity or kinking of the  shunt tubing identified.    Images personally reviewed, interpreted and dictated by AMY Garcia.       This report was signed and finalized on 12/30/2022 12:46 PM by AMY Garcia.         MDM    Initial impression of presenting illness: Altered mental status    DDX: includes but is not limited to:  Intracranial hemorrhage, C-spine fracture, status epilepticus, shunt malfunction    Patient arrives guarded with vitals interpreted by myself.     Pertinent features from physical exam: Not following commands, will localize to pain,  hand with left arm to move away from the chest.    Initial diagnostic plan: CT head and neck perfusion, shunt series, CBC, CMP, lactate, CRP, Trop, EKG, UA    Results from initial plan were reviewed and interpreted by me revealing concern for shunt malfunction on CT provided here.  CK elevated not significantly, no lactate elevation indicative of tonic-clonic seizure.  Patient is noted to have elevation in creatinine.  Baseline of 0.5 now 3.47.  Tolerating p.o. to her baseline per father who was at bedside.  Recent medication change during past admission from VPA to Keppra.  Had been on Levaquin as an inpatient as treatment for septic shock.    Diagnostic information from other sources: Discussed with father at bedside and given corroborating data    Interventions / Re-evaluation: Given 2 g of Keppra given past concern for seizure is inciting factor for last admission.  On reassessment, patient's GCS is improved from 9-11.  Now opening eyes to voice, following commands.  Nonverbal at baseline    Results/clinical rationale were discussed with father at bedside    Consultations/Discussion of results with other physicians: Discussed with .  Given our concern for shunt malfunction as well as recent admission from their service, they accept the patient as ED to ED transfer to the [facility] emergency department.  Patient has maintained mental status, stable vital signs.  Stable for transfer.    Disposition plan: Transfer to [facility] ER.  -----    Final diagnoses:   Altered mental status, unspecified altered mental status type        Jose Maria Guaman MD  12/30/22 1507       Jose Maria Guaman MD  12/30/22 1512       Jose Maria Guaman MD  12/30/22 1516

## 2023-01-02 LAB — LEVETIRACETAM SERPL-MCNC: 74.7 UG/ML (ref 10–40)

## 2023-01-04 LAB
BACTERIA SPEC AEROBE CULT: NORMAL
BACTERIA SPEC AEROBE CULT: NORMAL

## 2023-10-29 ENCOUNTER — APPOINTMENT (OUTPATIENT)
Dept: GENERAL RADIOLOGY | Facility: HOSPITAL | Age: 48
End: 2023-10-29
Payer: MEDICAID

## 2023-10-29 ENCOUNTER — APPOINTMENT (OUTPATIENT)
Dept: CT IMAGING | Facility: HOSPITAL | Age: 48
End: 2023-10-29
Payer: MEDICAID

## 2023-10-29 ENCOUNTER — HOSPITAL ENCOUNTER (EMERGENCY)
Facility: HOSPITAL | Age: 48
Discharge: HOME | End: 2023-10-30
Attending: EMERGENCY MEDICINE | Admitting: EMERGENCY MEDICINE
Payer: MEDICAID

## 2023-10-29 DIAGNOSIS — R93.0 ABNORMAL HEAD CT: ICD-10-CM

## 2023-10-29 DIAGNOSIS — R40.4 EPISODE OF ALTERED CONSCIOUSNESS: Primary | ICD-10-CM

## 2023-10-29 DIAGNOSIS — T39.1X4A ACETAMINOPHEN POISONING OF UNDETERMINED INTENT, INITIAL ENCOUNTER: ICD-10-CM

## 2023-10-29 LAB
A-A DO2: 35 MMHG
ARTERIAL PATENCY WRIST A: POSITIVE
ATMOSPHERIC PRESS: 732 MMHG
BASE EXCESS BLDA CALC-SCNC: -2.9 MMOL/L (ref 0–2)
BDY SITE: ABNORMAL
COHGB MFR BLD: 5.9 % (ref 0–2)
HCO3 BLDA-SCNC: 21.1 MMOL/L (ref 22–28)
HCT VFR BLD CALC: 45.3 %
INHALED O2 CONCENTRATION: 21 %
Lab: ABNORMAL
METHGB BLD QL: 0.4 % (ref 0–1.5)
MODALITY: ABNORMAL
OXYHGB MFR BLDV: 89.3 % (ref 94–99)
PCO2 BLDA: 34.1 MM HG (ref 35–45)
PCO2 TEMP ADJ BLD: ABNORMAL MM[HG]
PH BLDA: 7.4 PH UNITS (ref 7.35–7.45)
PH, TEMP CORRECTED: ABNORMAL
PO2 BLDA: 70.4 MM HG (ref 75–100)
PO2 TEMP ADJ BLD: ABNORMAL MM[HG]
SAO2 % BLDCOA: 95.4 % (ref 94–100)
VENTILATOR MODE: ABNORMAL

## 2023-10-29 PROCEDURE — 80179 DRUG ASSAY SALICYLATE: CPT | Performed by: EMERGENCY MEDICINE

## 2023-10-29 PROCEDURE — 36415 COLL VENOUS BLD VENIPUNCTURE: CPT

## 2023-10-29 PROCEDURE — 84484 ASSAY OF TROPONIN QUANT: CPT | Performed by: EMERGENCY MEDICINE

## 2023-10-29 PROCEDURE — 80050 GENERAL HEALTH PANEL: CPT | Performed by: EMERGENCY MEDICINE

## 2023-10-29 PROCEDURE — 94799 UNLISTED PULMONARY SVC/PX: CPT

## 2023-10-29 PROCEDURE — 82077 ASSAY SPEC XCP UR&BREATH IA: CPT | Performed by: EMERGENCY MEDICINE

## 2023-10-29 PROCEDURE — 36600 WITHDRAWAL OF ARTERIAL BLOOD: CPT

## 2023-10-29 PROCEDURE — 80164 ASSAY DIPROPYLACETIC ACD TOT: CPT | Performed by: EMERGENCY MEDICINE

## 2023-10-29 PROCEDURE — 70250 X-RAY EXAM OF SKULL: CPT

## 2023-10-29 PROCEDURE — 82805 BLOOD GASES W/O2 SATURATION: CPT

## 2023-10-29 PROCEDURE — 74018 RADEX ABDOMEN 1 VIEW: CPT

## 2023-10-29 PROCEDURE — 99284 EMERGENCY DEPT VISIT MOD MDM: CPT

## 2023-10-29 PROCEDURE — 71046 X-RAY EXAM CHEST 2 VIEWS: CPT

## 2023-10-29 PROCEDURE — 84145 PROCALCITONIN (PCT): CPT | Performed by: EMERGENCY MEDICINE

## 2023-10-29 PROCEDURE — 83050 HGB METHEMOGLOBIN QUAN: CPT

## 2023-10-29 PROCEDURE — 82140 ASSAY OF AMMONIA: CPT | Performed by: EMERGENCY MEDICINE

## 2023-10-29 PROCEDURE — 70450 CT HEAD/BRAIN W/O DYE: CPT

## 2023-10-29 PROCEDURE — 83605 ASSAY OF LACTIC ACID: CPT | Performed by: EMERGENCY MEDICINE

## 2023-10-29 PROCEDURE — 93005 ELECTROCARDIOGRAM TRACING: CPT | Performed by: EMERGENCY MEDICINE

## 2023-10-29 PROCEDURE — 82375 ASSAY CARBOXYHB QUANT: CPT

## 2023-10-29 PROCEDURE — 87040 BLOOD CULTURE FOR BACTERIA: CPT | Performed by: EMERGENCY MEDICINE

## 2023-10-30 VITALS
OXYGEN SATURATION: 98 % | WEIGHT: 205 LBS | DIASTOLIC BLOOD PRESSURE: 99 MMHG | HEART RATE: 95 BPM | SYSTOLIC BLOOD PRESSURE: 154 MMHG | BODY MASS INDEX: 38.71 KG/M2 | TEMPERATURE: 98.2 F | HEIGHT: 61 IN | RESPIRATION RATE: 14 BRPM

## 2023-10-30 LAB
ALBUMIN SERPL-MCNC: 4.9 G/DL (ref 3.5–5.2)
ALBUMIN/GLOB SERPL: 1.5 G/DL
ALP SERPL-CCNC: 95 U/L (ref 39–117)
ALT SERPL W P-5'-P-CCNC: 25 U/L (ref 1–33)
AMMONIA BLD-SCNC: 13 UMOL/L (ref 11–51)
AMPHET+METHAMPHET UR QL: NEGATIVE
AMPHETAMINES UR QL: NEGATIVE
ANION GAP SERPL CALCULATED.3IONS-SCNC: 15.5 MMOL/L (ref 5–15)
APAP SERPL-MCNC: 113.3 MCG/ML (ref 0–30)
AST SERPL-CCNC: 23 U/L (ref 1–32)
BACTERIA UR QL AUTO: ABNORMAL /HPF
BARBITURATES UR QL SCN: POSITIVE
BASOPHILS # BLD AUTO: 0.05 10*3/MM3 (ref 0–0.2)
BASOPHILS NFR BLD AUTO: 0.8 % (ref 0–1.5)
BENZODIAZ UR QL SCN: POSITIVE
BILIRUB SERPL-MCNC: 0.6 MG/DL (ref 0–1.2)
BILIRUB UR QL STRIP: ABNORMAL
BUN SERPL-MCNC: 17 MG/DL (ref 6–20)
BUN/CREAT SERPL: 21 (ref 7–25)
BUPRENORPHINE SERPL-MCNC: NEGATIVE NG/ML
CALCIUM SPEC-SCNC: 9.8 MG/DL (ref 8.6–10.5)
CANNABINOIDS SERPL QL: NEGATIVE
CHLORIDE SERPL-SCNC: 102 MMOL/L (ref 98–107)
CLARITY UR: CLEAR
CO2 SERPL-SCNC: 19.5 MMOL/L (ref 22–29)
COCAINE UR QL: NEGATIVE
COLOR UR: ABNORMAL
CREAT SERPL-MCNC: 0.81 MG/DL (ref 0.57–1)
D-LACTATE SERPL-SCNC: 1.3 MMOL/L (ref 0.5–2)
DEPRECATED RDW RBC AUTO: 45.1 FL (ref 37–54)
EGFRCR SERPLBLD CKD-EPI 2021: 88.6 ML/MIN/1.73
EOSINOPHIL # BLD AUTO: 0.05 10*3/MM3 (ref 0–0.4)
EOSINOPHIL NFR BLD AUTO: 0.8 % (ref 0.3–6.2)
ERYTHROCYTE [DISTWIDTH] IN BLOOD BY AUTOMATED COUNT: 13.5 % (ref 12.3–15.4)
ETHANOL BLD-MCNC: <10 MG/DL (ref 0–10)
ETHANOL UR QL: <0.01 %
FENTANYL UR-MCNC: NEGATIVE NG/ML
GLOBULIN UR ELPH-MCNC: 3.2 GM/DL
GLUCOSE SERPL-MCNC: 112 MG/DL (ref 65–99)
GLUCOSE UR STRIP-MCNC: NEGATIVE MG/DL
HCT VFR BLD AUTO: 45.5 % (ref 34–46.6)
HGB BLD-MCNC: 15 G/DL (ref 12–15.9)
HGB UR QL STRIP.AUTO: NEGATIVE
HYALINE CASTS UR QL AUTO: ABNORMAL /LPF
IMM GRANULOCYTES # BLD AUTO: 0.01 10*3/MM3 (ref 0–0.05)
IMM GRANULOCYTES NFR BLD AUTO: 0.2 % (ref 0–0.5)
KETONES UR QL STRIP: ABNORMAL
LEUKOCYTE ESTERASE UR QL STRIP.AUTO: NEGATIVE
LYMPHOCYTES # BLD AUTO: 2.13 10*3/MM3 (ref 0.7–3.1)
LYMPHOCYTES NFR BLD AUTO: 32.3 % (ref 19.6–45.3)
MCH RBC QN AUTO: 30 PG (ref 26.6–33)
MCHC RBC AUTO-ENTMCNC: 33 G/DL (ref 31.5–35.7)
MCV RBC AUTO: 91 FL (ref 79–97)
METHADONE UR QL SCN: NEGATIVE
MONOCYTES # BLD AUTO: 0.59 10*3/MM3 (ref 0.1–0.9)
MONOCYTES NFR BLD AUTO: 8.9 % (ref 5–12)
MUCOUS THREADS URNS QL MICRO: ABNORMAL /HPF
NEUTROPHILS NFR BLD AUTO: 3.77 10*3/MM3 (ref 1.7–7)
NEUTROPHILS NFR BLD AUTO: 57 % (ref 42.7–76)
NITRITE UR QL STRIP: NEGATIVE
NRBC BLD AUTO-RTO: 0 /100 WBC (ref 0–0.2)
OPIATES UR QL: NEGATIVE
OXYCODONE UR QL SCN: NEGATIVE
PCP UR QL SCN: NEGATIVE
PH UR STRIP.AUTO: <=5 [PH] (ref 5–8)
PLATELET # BLD AUTO: 235 10*3/MM3 (ref 140–450)
PMV BLD AUTO: 10.4 FL (ref 6–12)
POTASSIUM SERPL-SCNC: 3.9 MMOL/L (ref 3.5–5.2)
PROCALCITONIN SERPL-MCNC: 0.05 NG/ML (ref 0–0.25)
PROT SERPL-MCNC: 8.1 G/DL (ref 6–8.5)
PROT UR QL STRIP: ABNORMAL
RBC # BLD AUTO: 5 10*6/MM3 (ref 3.77–5.28)
RBC # UR STRIP: ABNORMAL /HPF
REF LAB TEST METHOD: ABNORMAL
SALICYLATES SERPL-MCNC: <0.3 MG/DL
SODIUM SERPL-SCNC: 137 MMOL/L (ref 136–145)
SP GR UR STRIP: >1.03 (ref 1–1.03)
SQUAMOUS #/AREA URNS HPF: ABNORMAL /HPF
TRICYCLICS UR QL SCN: POSITIVE
TROPONIN T SERPL HS-MCNC: 8 NG/L
TSH SERPL DL<=0.05 MIU/L-ACNC: 1.07 UIU/ML (ref 0.27–4.2)
UROBILINOGEN UR QL STRIP: ABNORMAL
VALPROATE SERPL-MCNC: <2.8 MCG/ML (ref 50–125)
WBC # UR STRIP: ABNORMAL /HPF
WBC NRBC COR # BLD: 6.6 10*3/MM3 (ref 3.4–10.8)

## 2023-10-30 PROCEDURE — 25810000003 SODIUM CHLORIDE 0.9 % SOLUTION: Performed by: EMERGENCY MEDICINE

## 2023-10-30 PROCEDURE — 81001 URINALYSIS AUTO W/SCOPE: CPT | Performed by: EMERGENCY MEDICINE

## 2023-10-30 PROCEDURE — 80307 DRUG TEST PRSMV CHEM ANLYZR: CPT | Performed by: EMERGENCY MEDICINE

## 2023-10-30 PROCEDURE — 80143 DRUG ASSAY ACETAMINOPHEN: CPT | Performed by: EMERGENCY MEDICINE

## 2023-10-30 PROCEDURE — P9612 CATHETERIZE FOR URINE SPEC: HCPCS

## 2023-10-30 RX ADMIN — SODIUM CHLORIDE 1000 ML: 9 INJECTION, SOLUTION INTRAVENOUS at 00:09

## 2023-10-30 NOTE — DISCHARGE INSTRUCTIONS
Call the  neurosurgery clinic for follow-up.  Continue home medications as prescribed.  Return to the emergency department for any new or worsening symptoms.

## 2023-10-30 NOTE — ED PROVIDER NOTES
HPI: Roxi Valerio is a 48 y.o. female who presents to the emergency department complaining of altered mental status.  Patient cannot provide any history.  Apparently EMS was called about an hour and a half ago as patient had fallen and needed help getting up.  She apparently declined transport at that time.  About 45 minutes ago patient's daughter called back stating that patient was unresponsive.  EMS reports that patient's daughter showed them an empty bottle of Tylenol stating that it was just previously full.  There is concern of overdose.  Patient unable to provide any other history as she is nonverbal at this time.  EMS also reports that patient had pinpoint pupils on their arrival, she was administered Narcan with no change in symptoms.      REVIEW OF SYSTEMS: Unable to obtain secondary to nonverbal    PAST MEDICAL HISTORY:   Past Medical History:   Diagnosis Date    COPD (chronic obstructive pulmonary disease)     Hypertension     Paralysis due to old stroke     right sided    Stroke     TBI (traumatic brain injury)         FAMILY HISTORY:   History reviewed. No pertinent family history.     SOCIAL HISTORY:   Social History     Socioeconomic History    Marital status:    Tobacco Use    Smoking status: Unknown     Passive exposure: Never   Vaping Use    Vaping Use: Unknown   Substance and Sexual Activity    Alcohol use: No    Drug use: No    Sexual activity: Defer        SURGICAL HISTORY:   Past Surgical History:   Procedure Laterality Date    BRAIN SURGERY      TUBAL ABDOMINAL LIGATION          ALLERGIES: Codeine and Penicillins       PHYSICAL EXAM:   VITAL SIGNS:   Vitals:    10/30/23 0253   BP: 154/99   Pulse: 95   Resp:    Temp:    SpO2: 98%      CONSTITUTIONAL: Chronically ill-appearing, wakes to verbal and noxious stimuli  HENT: Atraumatic, chronic deformity left calvarium, oral mucosa moist, airway patent. Nares patent without drainage. External ears normal.   EYES: Conjunctivae clear, EOMI,  "PERRL   NECK: Trachea midline, nontender, supple   CARDIOVASCULAR: Tachycardia, Normal rhythm.  PULMONARY/CHEST: Normal work of breathing. Clear to auscultation, no rhonchi, wheezes, or rales.  ABDOMINAL: Nondistended, soft, nontender, no rebound or guarding.  NEUROLOGIC: Chronic right-sided weakness,E3M2V5 = 10  EXTREMITIES: No clubbing, cyanosis, or edema   SKIN: Warm, Dry, No erythema, No rash       ED COURSE / MEDICAL DECISION MAKING:     Roxi Valerio is a 48 y.o. female who presents to the emergency department for evaluation of altered mental status, possible overdose.  Chronically ill-appearing obese lady with exam as above.  Her vital signs are notable for tachycardia.  Her exam is notable for depressed mental status.  Based on the history I suspect this is some sort of polypharmacy overdose.  Will obtain broad labs, CT of the head.  Will give IV fluids.  Will need 4-hour APAP level.  We will continue with supportive measures and observation at this time.  Disposition pending.    Differential diagnosis includes overdose, concussion, ICH, electrolyte derangement, intoxication, UTI among other etiologies.    EKG interpreted by me: Sinus tachycardia, some nonspecific ST/T changes, this is an abnormal EKG, morphology appears similar to previous    Lab work is nonactionable.  4-hour Tylenol level was below threshold.  Apparently patient's daughter showed up and stated that what she told EMS initially was wrong, there is apparently no concern about an overdose.  She did note that patient has been depressed seeming recently.  Daughter discussed with behavioral health and they are going to pursue memory care or long-term placement.  Over the course of patient's observation she has returned to her baseline.  Head CT did reveal compression of the right ventricle with concern about \"over shunting\".  I discussed this with the on-call neurosurgeon at  where patient gets her care.  Patient is going to follow-up in " clinic.  Patient will be discharged with family.    Final diagnoses:   Episode of altered consciousness   Acetaminophen poisoning of undetermined intent, initial encounter   Abnormal head CT        Augusto Alvarez MD  10/30/23 [id number]
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
## 2023-10-30 NOTE — ED NOTES
Jose Manuel Valerio, , contacted at this time to report pt status is stable. Pt will be returning home with instructions to f/u with memory care and neurological surgery.  acknowledges.

## 2023-10-30 NOTE — NURSING NOTE
Poison control contacted at this time.  S/s can include: N/V, abdominal cramping  No s/s until ESRD  Check: BP, O2  Supportive care    0245 - tylenol level  4hr post ingestion acetaminophen <150, no requirement. >150 requires acetylcysteine

## 2023-11-04 LAB
BACTERIA SPEC AEROBE CULT: NORMAL
BACTERIA SPEC AEROBE CULT: NORMAL

## 2024-07-20 ENCOUNTER — HOSPITAL ENCOUNTER (OUTPATIENT)
Facility: HOSPITAL | Age: 49
Setting detail: OBSERVATION
Discharge: NURSING, REHAB OR LONG-TERM CARE | End: 2024-07-26
Attending: STUDENT IN AN ORGANIZED HEALTH CARE EDUCATION/TRAINING PROGRAM | Admitting: INTERNAL MEDICINE
Payer: MEDICAID

## 2024-07-20 ENCOUNTER — APPOINTMENT (OUTPATIENT)
Dept: GENERAL RADIOLOGY | Facility: HOSPITAL | Age: 49
End: 2024-07-20
Payer: MEDICAID

## 2024-07-20 ENCOUNTER — APPOINTMENT (OUTPATIENT)
Dept: CT IMAGING | Facility: HOSPITAL | Age: 49
End: 2024-07-20
Payer: MEDICAID

## 2024-07-20 DIAGNOSIS — G89.29 OTHER CHRONIC PAIN: ICD-10-CM

## 2024-07-20 DIAGNOSIS — Z78.9 UNABLE TO CARE FOR SELF: Primary | ICD-10-CM

## 2024-07-20 LAB
ALBUMIN SERPL-MCNC: 4.3 G/DL (ref 3.5–5.2)
ALBUMIN/GLOB SERPL: 1.7 G/DL
ALP SERPL-CCNC: 103 U/L (ref 39–117)
ALT SERPL W P-5'-P-CCNC: 14 U/L (ref 1–33)
ANION GAP SERPL CALCULATED.3IONS-SCNC: 12.8 MMOL/L (ref 5–15)
AST SERPL-CCNC: 17 U/L (ref 1–32)
BACTERIA UR QL AUTO: ABNORMAL /HPF
BASOPHILS # BLD AUTO: 0.07 10*3/MM3 (ref 0–0.2)
BASOPHILS NFR BLD AUTO: 0.9 % (ref 0–1.5)
BILIRUB SERPL-MCNC: 0.3 MG/DL (ref 0–1.2)
BILIRUB UR QL STRIP: NEGATIVE
BUN SERPL-MCNC: 12 MG/DL (ref 6–20)
BUN/CREAT SERPL: 16.7 (ref 7–25)
CALCIUM SPEC-SCNC: 9 MG/DL (ref 8.6–10.5)
CHLORIDE SERPL-SCNC: 108 MMOL/L (ref 98–107)
CLARITY UR: ABNORMAL
CO2 SERPL-SCNC: 23.2 MMOL/L (ref 22–29)
COLOR UR: YELLOW
CREAT SERPL-MCNC: 0.72 MG/DL (ref 0.57–1)
DEPRECATED RDW RBC AUTO: 41.7 FL (ref 37–54)
EGFRCR SERPLBLD CKD-EPI 2021: 101.4 ML/MIN/1.73
EOSINOPHIL # BLD AUTO: 0.27 10*3/MM3 (ref 0–0.4)
EOSINOPHIL NFR BLD AUTO: 3.5 % (ref 0.3–6.2)
ERYTHROCYTE [DISTWIDTH] IN BLOOD BY AUTOMATED COUNT: 12.9 % (ref 12.3–15.4)
FLUAV SUBTYP SPEC NAA+PROBE: NOT DETECTED
FLUBV RNA ISLT QL NAA+PROBE: NOT DETECTED
GLOBULIN UR ELPH-MCNC: 2.6 GM/DL
GLUCOSE SERPL-MCNC: 91 MG/DL (ref 65–99)
GLUCOSE UR STRIP-MCNC: NEGATIVE MG/DL
HCT VFR BLD AUTO: 42.2 % (ref 34–46.6)
HGB BLD-MCNC: 14.3 G/DL (ref 12–15.9)
HGB UR QL STRIP.AUTO: NEGATIVE
HOLD SPECIMEN: NORMAL
HOLD SPECIMEN: NORMAL
HYALINE CASTS UR QL AUTO: ABNORMAL /LPF
IMM GRANULOCYTES # BLD AUTO: 0.2 10*3/MM3 (ref 0–0.05)
IMM GRANULOCYTES NFR BLD AUTO: 2.6 % (ref 0–0.5)
KETONES UR QL STRIP: NEGATIVE
LEUKOCYTE ESTERASE UR QL STRIP.AUTO: NEGATIVE
LYMPHOCYTES # BLD AUTO: 3.25 10*3/MM3 (ref 0.7–3.1)
LYMPHOCYTES NFR BLD AUTO: 41.9 % (ref 19.6–45.3)
MCH RBC QN AUTO: 29.7 PG (ref 26.6–33)
MCHC RBC AUTO-ENTMCNC: 33.9 G/DL (ref 31.5–35.7)
MCV RBC AUTO: 87.7 FL (ref 79–97)
MONOCYTES # BLD AUTO: 0.54 10*3/MM3 (ref 0.1–0.9)
MONOCYTES NFR BLD AUTO: 7 % (ref 5–12)
NEUTROPHILS NFR BLD AUTO: 3.42 10*3/MM3 (ref 1.7–7)
NEUTROPHILS NFR BLD AUTO: 44.1 % (ref 42.7–76)
NITRITE UR QL STRIP: POSITIVE
NRBC BLD AUTO-RTO: 0 /100 WBC (ref 0–0.2)
PH UR STRIP.AUTO: 6 [PH] (ref 5–8)
PLATELET # BLD AUTO: 219 10*3/MM3 (ref 140–450)
PMV BLD AUTO: 11.5 FL (ref 6–12)
POTASSIUM SERPL-SCNC: 4.6 MMOL/L (ref 3.5–5.2)
PROT SERPL-MCNC: 6.9 G/DL (ref 6–8.5)
PROT UR QL STRIP: ABNORMAL
RBC # BLD AUTO: 4.81 10*6/MM3 (ref 3.77–5.28)
RBC # UR STRIP: ABNORMAL /HPF
REF LAB TEST METHOD: ABNORMAL
SARS-COV-2 RNA RESP QL NAA+PROBE: NOT DETECTED
SODIUM SERPL-SCNC: 144 MMOL/L (ref 136–145)
SP GR UR STRIP: >=1.03 (ref 1–1.03)
SQUAMOUS #/AREA URNS HPF: ABNORMAL /HPF
UROBILINOGEN UR QL STRIP: ABNORMAL
WBC # UR STRIP: ABNORMAL /HPF
WBC NRBC COR # BLD AUTO: 7.75 10*3/MM3 (ref 3.4–10.8)
WHOLE BLOOD HOLD COAG: NORMAL
WHOLE BLOOD HOLD SPECIMEN: NORMAL

## 2024-07-20 PROCEDURE — 80053 COMPREHEN METABOLIC PANEL: CPT | Performed by: PHYSICIAN ASSISTANT

## 2024-07-20 PROCEDURE — 85025 COMPLETE CBC W/AUTO DIFF WBC: CPT | Performed by: PHYSICIAN ASSISTANT

## 2024-07-20 PROCEDURE — 81001 URINALYSIS AUTO W/SCOPE: CPT | Performed by: PHYSICIAN ASSISTANT

## 2024-07-20 PROCEDURE — 93005 ELECTROCARDIOGRAM TRACING: CPT | Performed by: STUDENT IN AN ORGANIZED HEALTH CARE EDUCATION/TRAINING PROGRAM

## 2024-07-20 PROCEDURE — 70450 CT HEAD/BRAIN W/O DYE: CPT

## 2024-07-20 PROCEDURE — P9612 CATHETERIZE FOR URINE SPEC: HCPCS

## 2024-07-20 PROCEDURE — 87636 SARSCOV2 & INF A&B AMP PRB: CPT | Performed by: PHYSICIAN ASSISTANT

## 2024-07-20 PROCEDURE — G0378 HOSPITAL OBSERVATION PER HR: HCPCS

## 2024-07-20 PROCEDURE — 71045 X-RAY EXAM CHEST 1 VIEW: CPT

## 2024-07-20 PROCEDURE — 36415 COLL VENOUS BLD VENIPUNCTURE: CPT

## 2024-07-20 PROCEDURE — 99285 EMERGENCY DEPT VISIT HI MDM: CPT

## 2024-07-20 RX ORDER — LOSARTAN POTASSIUM 25 MG/1
25 TABLET ORAL ONCE
Status: COMPLETED | OUTPATIENT
Start: 2024-07-21 | End: 2024-07-21

## 2024-07-20 RX ORDER — SODIUM CHLORIDE 0.9 % (FLUSH) 0.9 %
10 SYRINGE (ML) INJECTION AS NEEDED
Status: DISCONTINUED | OUTPATIENT
Start: 2024-07-20 | End: 2024-07-26 | Stop reason: HOSPADM

## 2024-07-21 PROBLEM — Z78.9 UNABLE TO CARE FOR SELF: Status: ACTIVE | Noted: 2024-07-21

## 2024-07-21 PROBLEM — I16.0 HYPERTENSIVE URGENCY: Status: ACTIVE | Noted: 2024-07-21

## 2024-07-21 LAB
ANION GAP SERPL CALCULATED.3IONS-SCNC: 12.9 MMOL/L (ref 5–15)
BUN SERPL-MCNC: 11 MG/DL (ref 6–20)
BUN/CREAT SERPL: 16.4 (ref 7–25)
CALCIUM SPEC-SCNC: 9 MG/DL (ref 8.6–10.5)
CHLORIDE SERPL-SCNC: 109 MMOL/L (ref 98–107)
CO2 SERPL-SCNC: 23.1 MMOL/L (ref 22–29)
CREAT SERPL-MCNC: 0.67 MG/DL (ref 0.57–1)
DEPRECATED RDW RBC AUTO: 42.2 FL (ref 37–54)
EGFRCR SERPLBLD CKD-EPI 2021: 106 ML/MIN/1.73
ERYTHROCYTE [DISTWIDTH] IN BLOOD BY AUTOMATED COUNT: 12.9 % (ref 12.3–15.4)
GLUCOSE SERPL-MCNC: 89 MG/DL (ref 65–99)
HCT VFR BLD AUTO: 41.9 % (ref 34–46.6)
HGB BLD-MCNC: 14 G/DL (ref 12–15.9)
MAGNESIUM SERPL-MCNC: 1.8 MG/DL (ref 1.6–2.6)
MCH RBC QN AUTO: 29.7 PG (ref 26.6–33)
MCHC RBC AUTO-ENTMCNC: 33.4 G/DL (ref 31.5–35.7)
MCV RBC AUTO: 89 FL (ref 79–97)
PLATELET # BLD AUTO: 227 10*3/MM3 (ref 140–450)
PMV BLD AUTO: 11.7 FL (ref 6–12)
POTASSIUM SERPL-SCNC: 2.6 MMOL/L (ref 3.5–5.2)
RBC # BLD AUTO: 4.71 10*6/MM3 (ref 3.77–5.28)
SODIUM SERPL-SCNC: 145 MMOL/L (ref 136–145)
WBC NRBC COR # BLD AUTO: 6.97 10*3/MM3 (ref 3.4–10.8)

## 2024-07-21 PROCEDURE — 83735 ASSAY OF MAGNESIUM: CPT | Performed by: FAMILY MEDICINE

## 2024-07-21 PROCEDURE — 94640 AIRWAY INHALATION TREATMENT: CPT

## 2024-07-21 PROCEDURE — 97161 PT EVAL LOW COMPLEX 20 MIN: CPT

## 2024-07-21 PROCEDURE — 80048 BASIC METABOLIC PNL TOTAL CA: CPT | Performed by: INTERNAL MEDICINE

## 2024-07-21 PROCEDURE — 94799 UNLISTED PULMONARY SVC/PX: CPT

## 2024-07-21 PROCEDURE — G0378 HOSPITAL OBSERVATION PER HR: HCPCS

## 2024-07-21 PROCEDURE — 25010000002 HYDRALAZINE PER 20 MG: Performed by: INTERNAL MEDICINE

## 2024-07-21 PROCEDURE — 96372 THER/PROPH/DIAG INJ SC/IM: CPT

## 2024-07-21 PROCEDURE — 25010000002 ENOXAPARIN PER 10 MG: Performed by: INTERNAL MEDICINE

## 2024-07-21 PROCEDURE — 94761 N-INVAS EAR/PLS OXIMETRY MLT: CPT

## 2024-07-21 PROCEDURE — 96374 THER/PROPH/DIAG INJ IV PUSH: CPT

## 2024-07-21 PROCEDURE — 85027 COMPLETE CBC AUTOMATED: CPT | Performed by: INTERNAL MEDICINE

## 2024-07-21 PROCEDURE — 99222 1ST HOSP IP/OBS MODERATE 55: CPT | Performed by: INTERNAL MEDICINE

## 2024-07-21 PROCEDURE — 84132 ASSAY OF SERUM POTASSIUM: CPT | Performed by: FAMILY MEDICINE

## 2024-07-21 RX ORDER — DIVALPROEX SODIUM 500 MG/1
500 TABLET, DELAYED RELEASE ORAL 2 TIMES DAILY
Status: DISCONTINUED | OUTPATIENT
Start: 2024-07-21 | End: 2024-07-26 | Stop reason: HOSPADM

## 2024-07-21 RX ORDER — FONDAPARINUX SODIUM 7.5 MG/.6ML
7.5 INJECTION SUBCUTANEOUS
COMMUNITY

## 2024-07-21 RX ORDER — POLYETHYLENE GLYCOL 3350 17 G/17G
17 POWDER, FOR SOLUTION ORAL DAILY PRN
Status: DISCONTINUED | OUTPATIENT
Start: 2024-07-21 | End: 2024-07-26 | Stop reason: HOSPADM

## 2024-07-21 RX ORDER — ASPIRIN 81 MG/1
81 TABLET, CHEWABLE ORAL DAILY
Status: DISCONTINUED | OUTPATIENT
Start: 2024-07-21 | End: 2024-07-26 | Stop reason: HOSPADM

## 2024-07-21 RX ORDER — ACETAMINOPHEN 650 MG/1
650 SUPPOSITORY RECTAL EVERY 4 HOURS PRN
Status: DISCONTINUED | OUTPATIENT
Start: 2024-07-21 | End: 2024-07-26 | Stop reason: HOSPADM

## 2024-07-21 RX ORDER — HYDRALAZINE HYDROCHLORIDE 20 MG/ML
10 INJECTION INTRAMUSCULAR; INTRAVENOUS EVERY 4 HOURS PRN
Status: DISCONTINUED | OUTPATIENT
Start: 2024-07-21 | End: 2024-07-26 | Stop reason: HOSPADM

## 2024-07-21 RX ORDER — SODIUM CHLORIDE 9 MG/ML
40 INJECTION, SOLUTION INTRAVENOUS AS NEEDED
Status: DISCONTINUED | OUTPATIENT
Start: 2024-07-21 | End: 2024-07-26 | Stop reason: HOSPADM

## 2024-07-21 RX ORDER — SODIUM CHLORIDE 0.9 % (FLUSH) 0.9 %
10 SYRINGE (ML) INJECTION EVERY 12 HOURS SCHEDULED
Status: DISCONTINUED | OUTPATIENT
Start: 2024-07-21 | End: 2024-07-26 | Stop reason: HOSPADM

## 2024-07-21 RX ORDER — BISACODYL 5 MG/1
5 TABLET, DELAYED RELEASE ORAL DAILY PRN
Status: DISCONTINUED | OUTPATIENT
Start: 2024-07-21 | End: 2024-07-26 | Stop reason: HOSPADM

## 2024-07-21 RX ORDER — LOSARTAN POTASSIUM 25 MG/1
25 TABLET ORAL ONCE
Status: COMPLETED | OUTPATIENT
Start: 2024-07-21 | End: 2024-07-21

## 2024-07-21 RX ORDER — DANTROLENE SODIUM 25 MG/1
25 CAPSULE ORAL 2 TIMES DAILY
COMMUNITY

## 2024-07-21 RX ORDER — ACETAMINOPHEN 325 MG/1
650 TABLET ORAL EVERY 4 HOURS PRN
Status: DISCONTINUED | OUTPATIENT
Start: 2024-07-21 | End: 2024-07-26 | Stop reason: HOSPADM

## 2024-07-21 RX ORDER — POTASSIUM CHLORIDE 1.5 G/1.58G
40 POWDER, FOR SOLUTION ORAL EVERY 4 HOURS
Status: DISCONTINUED | OUTPATIENT
Start: 2024-07-21 | End: 2024-07-21

## 2024-07-21 RX ORDER — FAMOTIDINE 20 MG/1
20 TABLET, FILM COATED ORAL DAILY
Status: DISCONTINUED | OUTPATIENT
Start: 2024-07-21 | End: 2024-07-26 | Stop reason: HOSPADM

## 2024-07-21 RX ORDER — LOSARTAN POTASSIUM 25 MG/1
25 TABLET ORAL DAILY
Status: DISCONTINUED | OUTPATIENT
Start: 2024-07-21 | End: 2024-07-21

## 2024-07-21 RX ORDER — POTASSIUM CHLORIDE 750 MG/1
40 CAPSULE, EXTENDED RELEASE ORAL EVERY 4 HOURS
Status: COMPLETED | OUTPATIENT
Start: 2024-07-21 | End: 2024-07-21

## 2024-07-21 RX ORDER — AMOXICILLIN 250 MG
2 CAPSULE ORAL 2 TIMES DAILY
Status: DISCONTINUED | OUTPATIENT
Start: 2024-07-21 | End: 2024-07-26 | Stop reason: HOSPADM

## 2024-07-21 RX ORDER — CITALOPRAM 20 MG/1
10 TABLET ORAL DAILY
Status: DISCONTINUED | OUTPATIENT
Start: 2024-07-21 | End: 2024-07-26 | Stop reason: HOSPADM

## 2024-07-21 RX ORDER — IPRATROPIUM BROMIDE AND ALBUTEROL SULFATE 2.5; .5 MG/3ML; MG/3ML
3 SOLUTION RESPIRATORY (INHALATION)
Status: DISCONTINUED | OUTPATIENT
Start: 2024-07-21 | End: 2024-07-25

## 2024-07-21 RX ORDER — ONDANSETRON 2 MG/ML
4 INJECTION INTRAMUSCULAR; INTRAVENOUS EVERY 6 HOURS PRN
Status: DISCONTINUED | OUTPATIENT
Start: 2024-07-21 | End: 2024-07-26 | Stop reason: HOSPADM

## 2024-07-21 RX ORDER — LOSARTAN POTASSIUM 50 MG/1
50 TABLET ORAL DAILY
Status: DISCONTINUED | OUTPATIENT
Start: 2024-07-22 | End: 2024-07-26 | Stop reason: HOSPADM

## 2024-07-21 RX ORDER — BISACODYL 10 MG
10 SUPPOSITORY, RECTAL RECTAL DAILY PRN
Status: DISCONTINUED | OUTPATIENT
Start: 2024-07-21 | End: 2024-07-26 | Stop reason: HOSPADM

## 2024-07-21 RX ORDER — LEVETIRACETAM 500 MG/1
500 TABLET ORAL 2 TIMES DAILY
Status: DISCONTINUED | OUTPATIENT
Start: 2024-07-21 | End: 2024-07-26 | Stop reason: HOSPADM

## 2024-07-21 RX ORDER — LABETALOL HYDROCHLORIDE 5 MG/ML
10 INJECTION, SOLUTION INTRAVENOUS EVERY 4 HOURS PRN
Status: DISCONTINUED | OUTPATIENT
Start: 2024-07-21 | End: 2024-07-26 | Stop reason: HOSPADM

## 2024-07-21 RX ORDER — PREGABALIN 75 MG/1
300 CAPSULE ORAL 2 TIMES DAILY
Status: DISCONTINUED | OUTPATIENT
Start: 2024-07-21 | End: 2024-07-26 | Stop reason: HOSPADM

## 2024-07-21 RX ORDER — ENOXAPARIN SODIUM 100 MG/ML
40 INJECTION SUBCUTANEOUS EVERY 24 HOURS
Status: DISCONTINUED | OUTPATIENT
Start: 2024-07-21 | End: 2024-07-25

## 2024-07-21 RX ORDER — IPRATROPIUM BROMIDE AND ALBUTEROL SULFATE 2.5; .5 MG/3ML; MG/3ML
3 SOLUTION RESPIRATORY (INHALATION) EVERY 4 HOURS PRN
Status: DISCONTINUED | OUTPATIENT
Start: 2024-07-21 | End: 2024-07-26 | Stop reason: HOSPADM

## 2024-07-21 RX ORDER — ACETAMINOPHEN 160 MG/5ML
650 SOLUTION ORAL EVERY 4 HOURS PRN
Status: DISCONTINUED | OUTPATIENT
Start: 2024-07-21 | End: 2024-07-26 | Stop reason: HOSPADM

## 2024-07-21 RX ORDER — PANTOPRAZOLE SODIUM 40 MG/1
40 TABLET, DELAYED RELEASE ORAL DAILY
COMMUNITY
End: 2024-07-26 | Stop reason: HOSPADM

## 2024-07-21 RX ORDER — DOCUSATE SODIUM 100 MG/1
100 CAPSULE, LIQUID FILLED ORAL 2 TIMES DAILY
COMMUNITY

## 2024-07-21 RX ORDER — SODIUM CHLORIDE 0.9 % (FLUSH) 0.9 %
10 SYRINGE (ML) INJECTION AS NEEDED
Status: DISCONTINUED | OUTPATIENT
Start: 2024-07-21 | End: 2024-07-26 | Stop reason: HOSPADM

## 2024-07-21 RX ORDER — FLUTICASONE PROPIONATE 44 UG/1
1 AEROSOL, METERED RESPIRATORY (INHALATION)
COMMUNITY

## 2024-07-21 RX ORDER — METOPROLOL SUCCINATE 25 MG/1
50 TABLET, EXTENDED RELEASE ORAL DAILY
Status: DISCONTINUED | OUTPATIENT
Start: 2024-07-21 | End: 2024-07-23

## 2024-07-21 RX ORDER — BUDESONIDE 0.5 MG/2ML
0.5 INHALANT ORAL
Status: DISCONTINUED | OUTPATIENT
Start: 2024-07-21 | End: 2024-07-26 | Stop reason: HOSPADM

## 2024-07-21 RX ADMIN — LEVETIRACETAM 500 MG: 500 TABLET, FILM COATED ORAL at 22:26

## 2024-07-21 RX ADMIN — ENOXAPARIN SODIUM 40 MG: 100 INJECTION SUBCUTANEOUS at 06:06

## 2024-07-21 RX ADMIN — Medication 10 ML: at 09:27

## 2024-07-21 RX ADMIN — BUDESONIDE 0.5 MG: 0.5 INHALANT RESPIRATORY (INHALATION) at 06:54

## 2024-07-21 RX ADMIN — LOSARTAN POTASSIUM 25 MG: 25 TABLET, FILM COATED ORAL at 00:06

## 2024-07-21 RX ADMIN — CITALOPRAM HYDROBROMIDE 10 MG: 20 TABLET ORAL at 09:27

## 2024-07-21 RX ADMIN — DIVALPROEX SODIUM 500 MG: 500 TABLET, DELAYED RELEASE ORAL at 11:05

## 2024-07-21 RX ADMIN — PREGABALIN 300 MG: 75 CAPSULE ORAL at 11:05

## 2024-07-21 RX ADMIN — ASPIRIN 81 MG CHEWABLE TABLET 81 MG: 81 TABLET CHEWABLE at 09:26

## 2024-07-21 RX ADMIN — SENNOSIDES AND DOCUSATE SODIUM 2 TABLET: 50; 8.6 TABLET ORAL at 01:54

## 2024-07-21 RX ADMIN — POTASSIUM CHLORIDE 40 MEQ: 750 CAPSULE, EXTENDED RELEASE ORAL at 18:02

## 2024-07-21 RX ADMIN — LEVETIRACETAM 500 MG: 500 TABLET, FILM COATED ORAL at 11:05

## 2024-07-21 RX ADMIN — POTASSIUM CHLORIDE 40 MEQ: 1.5 POWDER, FOR SOLUTION ORAL at 09:27

## 2024-07-21 RX ADMIN — IPRATROPIUM BROMIDE AND ALBUTEROL SULFATE 3 ML: .5; 3 SOLUTION RESPIRATORY (INHALATION) at 06:54

## 2024-07-21 RX ADMIN — IPRATROPIUM BROMIDE AND ALBUTEROL SULFATE 3 ML: .5; 3 SOLUTION RESPIRATORY (INHALATION) at 19:04

## 2024-07-21 RX ADMIN — DIVALPROEX SODIUM 500 MG: 500 TABLET, DELAYED RELEASE ORAL at 01:54

## 2024-07-21 RX ADMIN — LOSARTAN POTASSIUM 25 MG: 25 TABLET, FILM COATED ORAL at 15:58

## 2024-07-21 RX ADMIN — Medication 10 ML: at 22:25

## 2024-07-21 RX ADMIN — Medication 10 ML: at 21:59

## 2024-07-21 RX ADMIN — Medication 10 ML: at 01:55

## 2024-07-21 RX ADMIN — IPRATROPIUM BROMIDE AND ALBUTEROL SULFATE 3 ML: .5; 3 SOLUTION RESPIRATORY (INHALATION) at 12:30

## 2024-07-21 RX ADMIN — DIVALPROEX SODIUM 500 MG: 500 TABLET, DELAYED RELEASE ORAL at 22:26

## 2024-07-21 RX ADMIN — LOSARTAN POTASSIUM 25 MG: 25 TABLET, FILM COATED ORAL at 09:26

## 2024-07-21 RX ADMIN — LEVETIRACETAM 500 MG: 500 TABLET, FILM COATED ORAL at 01:54

## 2024-07-21 RX ADMIN — PREGABALIN 300 MG: 75 CAPSULE ORAL at 22:26

## 2024-07-21 RX ADMIN — POTASSIUM CHLORIDE 40 MEQ: 750 CAPSULE, EXTENDED RELEASE ORAL at 14:17

## 2024-07-21 RX ADMIN — FAMOTIDINE 20 MG: 20 TABLET, FILM COATED ORAL at 09:26

## 2024-07-21 RX ADMIN — METOPROLOL SUCCINATE 50 MG: 25 TABLET, EXTENDED RELEASE ORAL at 09:26

## 2024-07-21 RX ADMIN — BUDESONIDE 0.5 MG: 0.5 INHALANT RESPIRATORY (INHALATION) at 19:04

## 2024-07-21 RX ADMIN — PREGABALIN 300 MG: 75 CAPSULE ORAL at 01:54

## 2024-07-21 RX ADMIN — HYDRALAZINE HYDROCHLORIDE 10 MG: 20 INJECTION INTRAMUSCULAR; INTRAVENOUS at 11:26

## 2024-07-21 NOTE — PLAN OF CARE
Goal Outcome Evaluation:  Plan of Care Reviewed With: patient, spouse           Outcome Evaluation: Patient had no acute events today.

## 2024-07-21 NOTE — THERAPY EVALUATION
Patient Name: Roxi Valerio  : 1975    MRN: [id number]                              Today's Date: 2024       Admit Date: 2024    Visit Dx:     ICD-10-CM ICD-9-CM   1. Unable to care for self  Z78.9 V49.89     Patient Active Problem List   Diagnosis    Influenza A    Unable to care for self    Hypertensive urgency     Past Medical History:   Diagnosis Date    COPD (chronic obstructive pulmonary disease)     Hypertension     Paralysis due to old stroke     right sided    Stroke     TBI (traumatic brain injury)      Past Surgical History:   Procedure Laterality Date    BRAIN SURGERY      TUBAL ABDOMINAL LIGATION        General Information       Row Name 24 1103          Physical Therapy Time and Intention    Document Type evaluation  -TW     Mode of Treatment physical therapy  -TW       Row Name 24 1103          General Information    Patient Profile Reviewed yes  Pt is only able to day Yea and No, but this is inconsistent. No family present. It appears pt used w/c for mobility and transferred to w/c with hemiwalker. Per pt no R AFO.  -TW     Prior Level of Function min assist:;max assist:;all household mobility;w/c or scooter;ADL's  -TW     Existing Precautions/Restrictions fall;seizures  -TW     Barriers to Rehab medically complex;previous functional deficit;cognitive status;impaired sensation;contractures  -TW       Row Name 24 1103          Living Environment    People in Home other (see comments)  Family. Per pt her sister? but this is not clear due to pt's difficulty with verbalizing.  -TW       Row Name 24 1103          Home Main Entrance    Number of Stairs, Main Entrance none  -TW       Row Name 24 1103          Stairs Within Home, Primary    Number of Stairs, Within Home, Primary none  -TW       Row Name 24 1103          Cognition    Orientation Status (Cognition) oriented to;person;unable/difficult to assess  pt expressively aphasic but did say Yea when  renzo and date of birth provided.  -       Row Name 07/21/24 1103          Safety Issues, Functional Mobility    Safety Issues Affecting Function (Mobility) friction/shear risk;problem-solving;safety precaution awareness;safety precautions follow-through/compliance;sequencing abilities  -TW     Impairments Affecting Function (Mobility) balance;cognition;grasp;muscle tone abnormal;motor planning;range of motion (ROM);strength;endurance/activity tolerance;coordination;motor control;pain  -TW     Cognitive Impairments, Mobility Safety/Performance safety precaution awareness;safety precaution follow-through;sequencing abilities  -TW               User Key  (r) = Recorded By, (t) = Taken By, (c) = Cosigned By      Initials Name Provider Type    TW Cherelle Herrera, PT Physical Therapist                   Mobility       Row Name 07/21/24 1103          Bed Mobility    Bed Mobility supine-sit  -TW     Supine-Sit [place] (Bed Mobility) moderate assist (50% patient effort);verbal cues;nonverbal cues (demo/gesture)  -TW     Assistive Device (Bed Mobility) head of bed elevated  -TW     Comment, (Bed Mobility) once pt was sitting on EOB she was able to maintain midline sitting x 6 min once RUE placed beside her. Pt was not observed to be impulsive and attempted to assist with all mobility.  -       Row Name 07/21/24 1103          Bed-Chair Transfer    Bed-Chair [place] (Transfers) minimum assist (75% patient effort);nonverbal cues (demo/gesture);verbal cues  -TW     Assistive Device (Bed-Chair Transfers) walker, lourdes  -TW     Comment, (Bed-Chair Transfer) going to the L. Limited wt placed through RLE and limited stepping with RLE.  -       Row Name 07/21/24 1103          Sit-Stand Transfer    Sit-Stand [place] (Transfers) minimum assist (75% patient effort);nonverbal cues (demo/gesture);verbal cues  -TW     Assistive Device (Sit-Stand Transfers) walker, lourdes  -TW     Comment, (Sit-Stand Transfer) pt's wt  primarily on the LLE. Pt had extensor tone throughout RLE. R ankle plantar flexed and supinated with wt on lateral aspect of R foot  -TW       Row Name 07/21/24 1103          Gait/Stairs (Locomotion)    [place] Level (Gait) not tested  -TW     Comment, (Gait/Stairs) per pt she hasn't been ambulating  -TW               User Key  (r) = Recorded By, (t) = Taken By, (c) = Cosigned By      Initials Name Provider Type    TW Cherelle Herrera, PT Physical Therapist                   Obj/Interventions       Row Name 07/21/24 1103          Range of Motion Comprehensive    Comment, General Range of Motion Pt has extensor tone pattern in RLE and UE. When supine therapist not able to dorsiflex R ankle at all. When sitting on EOB pt was able to position R ankle in -10 degrees dorsiflexion.  Pt has limited ROM in RUE as well.  -       Row Name 07/21/24 1103          Strength Comprehensive (MMT)    Comment, General Manual Muscle Testing (MMT) Assessment No isolated movement seen in RLE. Pt's LLE grossly 3+/5 to 4+/5  -       Row Name 07/21/24 1103          Motor Skills    Motor Skills muscle tone  -TW     Muscle Tone right;upper extremity(s);lower extremity(s);hypertonia;severe impairment  -       Row Name 07/21/24 1103          Balance    Balance Assessment sitting static balance;sit to stand dynamic balance;sitting dynamic balance;standing static balance;standing dynamic balance  -TW     Static Sitting Balance standby assist  -TW     Dynamic Sitting Balance standby assist  -TW     Position, Sitting Balance unsupported;sitting edge of bed  -TW     Sit to Stand Dynamic Balance minimal assist;non-verbal cues (demo/gesture);verbal cues  -TW     Static Standing Balance minimal assist;verbal cues;non-verbal cues (demo/gesture)  -TW     Dynamic Standing Balance minimal assist;verbal cues;non-verbal cues (demo/gesture)  -TW     Position/Device Used, Standing Balance walker, lourdes  -TW               User Key  (r) = Recorded By, (t)  = Taken By, (c) = Cosigned By      Initials Name Provider Type    TW Sharon, Cherelle, PT Physical Therapist                   Goals/Plan       Row Name 07/21/24 1103          Bed Mobility Goal 1 (PT)    Activity/Assistive Device (Bed Mobility Goal 1, PT) bed mobility activities, all  -TW     [place] Level/Cues Needed (Bed Mobility Goal 1, PT) contact guard required  -TW     Time Frame (Bed Mobility Goal 1, PT) long term goal (LTG);2 weeks  -TW     Strategies/Barriers (Bed Mobility Goal 1, PT) with use of hospital bed as needed  -TW     Progress/Outcomes (Bed Mobility Goal 1, PT) goal ongoing  -TW       Row Name 07/21/24 1103          Transfer Goal 1 (PT)    Activity/Assistive Device (Transfer Goal 1, PT) sit-to-stand/stand-to-sit;bed-to-chair/chair-to-bed;toilet;walker, lourdes  -TW     [place] Level/Cues Needed (Transfer Goal 1, PT) contact guard required  -TW     Time Frame (Transfer Goal 1, PT) short term goal (STG);1 week  -TW     Progress/Outcome (Transfer Goal 1, PT) goal ongoing  -TW       Row Name 07/21/24 1103          Gait Training Goal 1 (PT)    Activity/Assistive Device (Gait Training Goal 1, PT) other (see comments)  gait goal not being set at this time as pt appears to been nonambulatory. If gait is determined to be appropriate a R double upright orthotic would be appropriate due to pt's strong   spasticity in RLE.  -TW       Row Name 07/21/24 1103          Problem Specific Goal 1 (PT)    Problem Specific Goal 1 (PT) pt to tolerate standing with hemiwalker for 30 sec x 3 with CGA  -TW     Time Frame (Problem Specific Goal 1, PT) short-term goal (STG)  -TW     Progress/Outcome (Problem Specific Goal 1, PT) goal ongoing  -TW       Row Name 07/21/24 1103          Patient Education Goal (PT)    Activity (Patient Education Goal, PT) BLE ther ex 1 x 10  -TW     [place]/Cues/Accuracy (Memory Goal 2, PT) demonstrates adequately  -TW     Time Frame (Patient Education Goal, PT) long term goal (LTG);2  weeks  -TW     Progress/Outcome (Patient Education Goal, PT) goal ongoing  -TW       Row Name 07/21/24 1103          Therapy Assessment/Plan (PT)    Planned Therapy Interventions (PT) balance training;bed mobility training;transfer training;patient/family education;strengthening  -TW               User Key  (r) = Recorded By, (t) = Taken By, (c) = Cosigned By      Initials Name Provider Type    TW Cherelle Herrera, PT Physical Therapist                   Clinical Impression       Row Name 07/21/24 1103          Pain    Pretreatment Pain Rating 7/10  -TW     Posttreatment Pain Rating 7/10  -TW     Pain Location - Side/Orientation Bilateral  -TW     Pain Location anterior  -TW     Pain Location - head  -TW     Pre/Posttreatment Pain Comment Pt c/o HA that didn't change during evaluation. Pt also had c/o increased pain with any R  shoulder movement.  -TW     Pain Intervention(s) Repositioned  -TW       Row Name 07/21/24 1103          Plan of Care Review    Plan of Care Reviewed With patient  -TW     Outcome Evaluation PT evaluation completed this date with pt presenting supine in bed while on room air. Pt expressively aphasic but did say yes/no to simple questions. No family present and pt's prior functional mobility level not clear. Pt did indicate she had w/c at home and hemiwalker. This morning, pt indicated she did have a headache rated 7/10. Pt presented with R sided spasticity in an extensor pattern with no isolated RUE movement observed and with pt expressing pain with R shoulder movement. RLE has limited R dorsiflexion and tends to pull into supination with mobility. Pt needed mod assist to come to sit on EOB and once in sitting could maintain midline with close SBA x 6 min. Pt came to stand with hemiwalker and min assist with most of her wt through the LLE. Pt pivoted to chair to the L on her LLE with limited isolated RLE movement and extensor tone assisting in keeping R knee from buckling. Pt indicates she does  not have a AFO for RLE. Pt does present with deficits in isolated strength on R side, decreased balance, and endurance with activity. Pt is expected to improve her functional mobility with continued PT services prior to d/c.  -TW       Row Name 07/21/24 1103          Therapy Assessment/Plan (PT)    Rehab Potential (PT) good, to achieve stated therapy goals  -TW     Criteria for Skilled Interventions Met (PT) yes;meets criteria;skilled treatment is necessary  -TW     Therapy Frequency (PT) 5 times/wk  -TW     Predicted Duration of Therapy Intervention (PT) 2 wks  -TW       Row Name 07/21/24 1103          Vital Signs    O2 Delivery Pre Treatment room air  -TW     Pre Patient Position Supine  -TW     Intra Patient Position Standing  -TW     Post Patient Position Sitting  -TW       Row Name 07/21/24 1103          Positioning and Restraints    Pre-Treatment Position in bed  -TW     Post Treatment Position chair  -TW     In Chair notified nsg;reclined;call light within reach;encouraged to call for assist;exit alarm on;legs elevated  -TW               User Key  (r) = Recorded By, (t) = Taken By, (c) = Cosigned By      Initials Name Provider Type    TW Chreelle Herrera, PT Physical Therapist                   Outcome Measures       Row Name 07/21/24 1103 07/21/24 0800       How much help from another person do you currently need...    Turning from your back to your side while in flat bed without using bedrails? 3  -TW 3  -HN    Moving from lying on back to sitting on the side of a flat bed without bedrails? 2  -TW 3  -HN    Moving to and from a bed to a chair (including a wheelchair)? 3  -TW 2  -HN    Standing up from a chair using your arms (e.g., wheelchair, bedside chair)? 3  -TW 2  -HN    Climbing 3-5 steps with a railing? 1  -TW 1  -HN    To walk in hospital room? 1  -TW 1  -HN    AM-PAC 6 Clicks Score (PT) 13  -TW 12  -HN    Highest Level of Mobility Goal 4 --> Transfer to chair/commode  -TW 4 --> Transfer to  chair/commode  -HN      Row Name 07/21/24 1103          Functional Assessment    Outcome Measure Options AM-PAC 6 Clicks Basic Mobility (PT)  -TW               User Key  (r) = Recorded By, (t) = Taken By, (c) = Cosigned By      Initials Name Provider Type    TW Cherelle Herrera, [facility] Physical Therapist    Krystal Son, RN Registered Nurse                                 Physical Therapy Education       Title: PT OT SLP Therapies (In Progress)       Topic: Physical Therapy (In Progress)       Point: Mobility training (Done)       Learning Progress Summary             Patient Acceptance, E, VU by TW at 7/21/2024 1103    Comment: pt education on purpose of PT evaluation and her inpt PT POC                         Point: Home exercise program (Not Started)       Learner Progress:  Not documented in this visit.              Point: Body mechanics (Not Started)       Learner Progress:  Not documented in this visit.              Point: Precautions (Not Started)       Learner Progress:  Not documented in this visit.                              User Key       Initials Effective Dates Name Provider Type Discipline     06/16/21 -  Cherelle Herrera PT Physical Therapist PT                  PT Recommendation and Plan  Planned Therapy Interventions (PT): balance training, bed mobility training, transfer training, patient/family education, strengthening  Plan of Care Reviewed With: patient  Outcome Evaluation: PT evaluation completed this date with pt presenting supine in bed while on room air. Pt expressively aphasic but did say yes/no to simple questions. No family present and pt's prior functional mobility level not clear. Pt did indicate she had w/c at home and hemiwalker. This morning, pt indicated she did have a headache rated 7/10. Pt presented with R sided spasticity in an extensor pattern with no isolated RUE movement observed and with pt expressing pain with R shoulder movement. RLE has limited R dorsiflexion and tends to  pull into supination with mobility. Pt needed mod assist to come to sit on EOB and once in sitting could maintain midline with close SBA x 6 min. Pt came to stand with hemiwalker and min assist with most of her wt through the LLE. Pt pivoted to chair to the L on her LLE with limited isolated RLE movement and extensor tone assisting in keeping R knee from buckling. Pt indicates she does not have a AFO for RLE. Pt does present with deficits in isolated strength on R side, decreased balance, and endurance with activity. Pt is expected to improve her functional mobility with continued PT services prior to d/c.     Time Calculation:   PT Evaluation Complexity  History, PT Evaluation Complexity: 3 or more personal factors and/or comorbidities  Examination of Body Systems (PT Eval Complexity): total of 4 or more elements  Clinical Presentation (PT Evaluation Complexity): stable  Clinical Decision Making (PT Evaluation Complexity): low complexity  Overall Complexity (PT Evaluation Complexity): low complexity     PT Charges       Row Name 07/21/24 1103             Time Calculation    Stop Time 1103  -TW      PT Received On 07/21/24 -TW      PT Goal Re-Cert Due Date 07/31/24 -TW                User Key  (r) = Recorded By, (t) = Taken By, (c) = Cosigned By      Initials Name Provider Type    TW Cherelle Herrera PT Physical Therapist                  Therapy Charges for Today       Code Description Service Date Service Provider Modifiers Qty    [id number]  PT EVAL LOW COMPLEXITY 3 7/21/2024 Cherelle Herrera PT GP 1            PT G-Codes  Outcome Measure Options: AM-PAC 6 Clicks Basic Mobility (PT)  AM-PAC 6 Clicks Score (PT): 13  PT Discharge Summary  Anticipated Discharge Disposition (PT): other (see comments) (Not clear at this time. Pt does need assist with all mobility and ADLs.)    Cherelle Herrera PT  7/21/2024

## 2024-07-21 NOTE — PLAN OF CARE
Goal Outcome Evaluation:  Plan of Care Reviewed With: patient           Outcome Evaluation: PT evaluation completed this date with pt presenting supine in bed while on room air. Pt expressively aphasic but did say yes/no to simple questions. No family present and pt's prior functional mobility level not clear. Pt did indicate she had w/c at home and hemiwalker. This morning, pt indicated she did have a headache rated 7/10. Pt presented with R sided spasticity in an extensor pattern with no isolated RUE movement observed and with pt expressing pain with R shoulder movement. RLE has limited R dorsiflexion and tends to pull into supination with mobility. Pt needed mod assist to come to sit on EOB and once in sitting could maintain midline with close SBA x 6 min. Pt came to stand with hemiwalker and min assist with most of her wt through the LLE. Pt pivoted to chair to the L on her LLE with limited isolated RLE movement and extensor tone assisting in keeping R knee from buckling. Pt indicates she does not have a AFO for RLE. Pt does present with deficits in isolated strength on R side, decreased balance, and endurance with activity. Pt is expected to improve her functional mobility with continued PT services prior to d/c.      Anticipated Discharge Disposition (PT): other (see comments) (Not clear at this time. Pt does need assist with all mobility and ADLs.)
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
## 2024-07-21 NOTE — ED NOTES
"Spoke with Wendy with [facility] regarding pts concern. Wendy states the \"case has been created\" and she is reaching out to the [facility] .   "

## 2024-07-21 NOTE — PROGRESS NOTES
"Pharmacy Consult - Enoxaparin Dosing    Roxi Valerio is a 49 y.o. female who has been consulted to dose enoxaparin for VTE prophylaxis.     Allergies    Codeine and Penicillins    Relevant clinical data and objective history reviewed:     [Ht: 154.9 cm (61\"); Wt: 93 kg (205 lb 0.4 oz)]  Body mass index is 38.74 kg/m².    Estimated Creatinine Clearance: 96.2 mL/min (by C-G formula based on SCr of 0.72 mg/dL).    Results from last 7 days   Lab Units 07/20/24  2304   HEMOGLOBIN g/dL 14.3   HEMATOCRIT % 42.2   PLATELETS 10*3/mm3 219   CREATININE mg/dL 0.72       Asessment/Plan    Initiate Enoxaparin 40 mg SQ every 24 hours  Pharmacy will monitor Ms. Valerio's renal function and clinical status and adjust the enoxaparin dose and/or frequency as needed.    Thank you,  Gillian Barksdale RP,PharmD  7/21/2024  01:05 EDT      "
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
## 2024-07-21 NOTE — PAYOR COMM NOTE
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 tablet 10 mg  10 mg Oral Daily Syed Suarez MD        divalproex (DEPAKOTE) DR tablet 500 mg  500 mg Oral BID Syed Suarez MD   500 mg at 07/21/24 0154    Enoxaparin Sodium (LOVENOX) syringe 40 mg  40 mg Subcutaneous Q24H Syed Suarez MD   40 mg at 07/21/24 0606    famotidine (PEPCID) tablet 20 mg  20 mg Oral Daily Syed Suarez MD        hydrALAZINE (APRESOLINE) injection 10 mg  10 mg Intravenous Q4H PRN Syed Suarez MD        ipratropium-albuterol (DUO-NEB) nebulizer solution 3 mL  3 mL Nebulization Q4H PRN Syed Suarez MD        ipratropium-albuterol (DUO-NEB) nebulizer solution 3 mL  3 mL Nebulization Q6H - RT Syed Suarez MD   3 mL at 07/21/24 0654    levETIRAcetam (KEPPRA) tablet 500 mg  500 mg Oral BID Syed Suarez MD   500 mg at 07/21/24 0154    losartan (COZAAR) tablet 25 mg  25 mg Oral Daily Syed Suarez MD        Magnesium Standard Dose Replacement - Follow Nurse / BPA Driven Protocol   Does not apply PRN Luzmaria Cat MD        metoprolol succinate XL (TOPROL-XL) 24 hr tablet 50 mg  50 mg Oral Daily Syed Suarez MD         ondansetron (ZOFRAN) injection 4 mg  4 mg Intravenous Q6H PRN Syed Suarez MD        Pharmacy to Dose enoxaparin (LOVENOX)   Does not apply Continuous PRN Syed Suarez MD        Potassium Replacement - Follow Nurse / BPA Driven Protocol   Does not apply PRN Luzmaria Cat MD        pregabalin (LYRICA) capsule 300 mg  300 mg Oral BID Syed Suarez MD   300 mg at 07/21/24 0154    sodium chloride 0.9 % flush 10 mL  10 mL Intravenous PRN Jim Alcala MD        sodium chloride 0.9 % flush 10 mL  10 mL Intravenous Q12H Syed Suarez MD   10 mL at 07/21/24 0155    sodium chloride 0.9 % flush 10 mL  10 mL Intravenous PRN Syed Suarez MD        sodium chloride 0.9 % infusion 40 mL  40 mL Intravenous PRN Syed Suarez MD         Lab Results (last 24 hours)       Procedure Component Value Units Date/Time    Basic Metabolic Panel [[id number]]  (Abnormal) Collected: 07/21/24 0643    Specimen: Blood Updated: 07/21/24 0817     Glucose 89 mg/dL      BUN 11 mg/dL      Creatinine 0.67 mg/dL      Sodium 145 mmol/L      Potassium 2.6 mmol/L      Chloride 109 mmol/L      CO2 23.1 mmol/L      Calcium 9.0 mg/dL      BUN/Creatinine Ratio 16.4     Anion Gap 12.9 mmol/L      eGFR 106.0 mL/min/1.73     Narrative:      GFR Normal >60  Chronic Kidney Disease <60  Kidney Failure <15      CBC (No Diff) [[id number]]  (Normal) Collected: 07/21/24 0643    Specimen: Blood Updated: 07/21/24 0727     WBC 6.97 10*3/mm3      RBC 4.71 10*6/mm3      Hemoglobin 14.0 g/dL      Hematocrit 41.9 %      MCV 89.0 fL      MCH 29.7 pg      MCHC 33.4 g/dL      RDW 12.9 %      RDW-SD 42.2 fl      MPV 11.7 fL      Platelets 227 10*3/mm3     Comprehensive Metabolic Panel [[id number]]  (Abnormal) Collected: 07/20/24 2304    Specimen: Blood Updated: 07/20/24 2356     Glucose 91 mg/dL      BUN 12 mg/dL      Creatinine 0.72 mg/dL      Sodium 144 mmol/L      Potassium 4.6 mmol/L      Comment: Specimen hemolyzed.  Result may be falsely elevated.        Chloride 108  mmol/L      CO2 23.2 mmol/L      Calcium 9.0 mg/dL      Total Protein 6.9 g/dL      Albumin 4.3 g/dL      ALT (SGPT) 14 U/L      Comment: Specimen hemolyzed.  Result may  be falsely elevated.        AST (SGOT) 17 U/L      Comment: Specimen hemolyzed.  Result may be falsely elevated.        Alkaline Phosphatase 103 U/L      Total Bilirubin 0.3 mg/dL      Globulin 2.6 gm/dL      A/G Ratio 1.7 g/dL      BUN/Creatinine Ratio 16.7     Anion Gap 12.8 mmol/L      eGFR 101.4 mL/min/1.73     Narrative:      GFR Normal >60  Chronic Kidney Disease <60  Kidney Failure <15      Urinalysis With Culture If Indicated - Straight Cath [[id number]]  (Abnormal) Collected: 07/20/24 2315    Specimen: Urine from Straight Cath Updated: 07/20/24 2345     Color, UA Yellow     Appearance, UA Cloudy     pH, UA 6.0     Specific Gravity, UA >=1.030     Glucose, UA Negative     Ketones, UA Negative     Bilirubin, UA Negative     Blood, UA Negative     Protein, UA Trace     Leuk Esterase, UA Negative     Nitrite, UA Positive     Urobilinogen, UA 0.2 E.U./dL    Narrative:      In absence of clinical symptoms, the presence of pyuria, bacteria, and/or nitrites on the urinalysis result does not correlate with infection.    Urinalysis, Microscopic Only - Straight Cath [[id number]]  (Abnormal) Collected: 07/20/24 2315    Specimen: Urine from Straight Cath Updated: 07/20/24 2345     RBC, UA None Seen /HPF      WBC, UA 0-2 /HPF      Comment: Urine culture not indicated.        Bacteria, UA 3+ /HPF      Squamous Epithelial Cells, UA 0-2 /HPF      Hyaline Casts, UA 0-2 /LPF      Methodology Manual Light Microscopy    CBC & Differential [[id number]]  (Abnormal) Collected: 07/20/24 2304    Specimen: Blood Updated: 07/20/24 2327    Narrative:      The following orders were created for panel order CBC & Differential.  Procedure                               Abnormality         Status                     ---------                               -----------          ------                     CBC Auto Differential[[id number]]        Abnormal            Final result               Scan Slide[[id number]]                                                                    Please view results for these tests on the individual orders.    CBC Auto Differential [[id number]]  (Abnormal) Collected: 07/20/24 2304    Specimen: Blood Updated: 07/20/24 2326     WBC 7.75 10*3/mm3      RBC 4.81 10*6/mm3      Hemoglobin 14.3 g/dL      Hematocrit 42.2 %      MCV 87.7 fL      MCH 29.7 pg      MCHC 33.9 g/dL      RDW 12.9 %      RDW-SD 41.7 fl      MPV 11.5 fL      Platelets 219 10*3/mm3      Neutrophil % 44.1 %      Lymphocyte % 41.9 %      Monocyte % 7.0 %      Eosinophil % 3.5 %      Basophil % 0.9 %      Immature Grans % 2.6 %      Neutrophils, Absolute 3.42 10*3/mm3      Lymphocytes, Absolute 3.25 10*3/mm3      Monocytes, Absolute 0.54 10*3/mm3      Eosinophils, Absolute 0.27 10*3/mm3      Basophils, Absolute 0.07 10*3/mm3      Immature Grans, Absolute 0.20 10*3/mm3      nRBC 0.0 /100 WBC     COVID-19 and FLU A/B PCR, 1 HR TAT - Swab, Nasopharynx [[id number]]  (Normal) Collected: 07/20/24 2250    Specimen: Swab from Nasopharynx Updated: 07/20/24 2314     COVID19 Not Detected     Influenza A PCR Not Detected     Influenza B PCR Not Detected    Narrative:      Fact sheet for providers: https://www.fda.gov/media/245684/download    Fact sheet for patients: https://www.fda.gov/media/803459/download    Test performed by PCR.    [place] Draw [[id number]] Collected: 07/20/24 2228    Specimen: Blood Updated: 07/20/24 2232    Narrative:      The following orders were created for panel order [place] Draw.  Procedure                               Abnormality         Status                     ---------                               -----------         ------                     Green Top (Gel)[[id number]]                                  Final result               Lavender Top[[id number]]                                      Final result               Gold Top - SST[[id number]]                                   Final result               Light Blue Top[[id number]]                                   Final result                 Please view results for these tests on the individual orders.    Green Top (Gel) [[id number]] Collected: 07/20/24 2228    Specimen: Blood Updated: 07/20/24 2232     Extra Tube Hold for add-ons.     Comment: Auto resulted.       Lavender Top [[id number]] Collected: 07/20/24 2228    Specimen: Blood Updated: 07/20/24 2232     Extra Tube hold for add-on     Comment: Auto resulted       Gold Top - SST [[id number]] Collected: 07/20/24 2228    Specimen: Blood Updated: 07/20/24 2232     Extra Tube Hold for add-ons.     Comment: Auto resulted.       Light Blue Top [[id number]] Collected: 07/20/24 2228    Specimen: Blood Updated: 07/20/24 2232     Extra Tube Hold for add-ons.     Comment: Auto resulted             Imaging Results (Last 24 Hours)       Procedure Component Value Units Date/Time    CT Head Without Contrast [[id number]] Collected: 07/20/24 2311     Updated: 07/21/24 0010    Narrative:      FINAL REPORT    TECHNIQUE:  Multiple axial CT images were performed from the foramen magnum  to the vertex. This study was performed with techniques to keep  radiation doses as low as reasonably achievable (ALARA).  Individualized dose reduction techniques using automated  exposure control or adjustment of mA and/or kV according to the  patient's size were employed.    CLINICAL HISTORY:  headache, hx of TBI    COMPARISON:  10/30/2023    FINDINGS:  There is a right frontal approach ventriculoperitoneal shunt.  There is significant left cerebral encephalomalacia, unchanged.  The patient is status post left craniectomy.  No acute  intracranial hemorrhage.      Impression:      No acute intracranial hemorrhage.    Authenticated and Electronically Signed by Keven Quevedo MD on  07/21/2024 12:09:02 AM    XR Chest 1 View  "[[id number]] Resulted: 07/20/24 2241     Updated: 07/20/24 2242          Orders (last 24 hrs)        Start     Ordered    07/21/24 0900  aspirin chewable tablet 81 mg  Daily         07/21/24 0042    07/21/24 0900  citalopram (CeleXA) tablet 10 mg  Daily         07/21/24 0042    07/21/24 0900  famotidine (PEPCID) tablet 20 mg  Daily         07/21/24 0042    07/21/24 0900  losartan (COZAAR) tablet 25 mg  Daily         07/21/24 0042    07/21/24 0900  metoprolol succinate XL (TOPROL-XL) 24 hr tablet 50 mg  Daily         07/21/24 0042    07/21/24 0821  Magnesium  STAT         07/21/24 0820 07/21/24 0820  Magnesium Standard Dose Replacement - Follow Nurse / BPA Driven Protocol  As Needed         07/21/24 0820    07/21/24 0820  Potassium Replacement - Follow Nurse / BPA Driven Protocol  As Needed         07/21/24 0820    07/21/24 0800  Oral Care  2 Times Daily       07/21/24 0043    07/21/24 0600  Incentive Spirometry  Every 4 Hours While Awake       07/21/24 0043    07/21/24 0600  Basic Metabolic Panel  Morning Draw         07/21/24 0043    07/21/24 0600  CBC (No Diff)  Morning Draw         07/21/24 0043    07/21/24 0600  Enoxaparin Sodium (LOVENOX) syringe 40 mg  Every 24 Hours         07/21/24 0103    07/21/24 0400  Vital Signs  Every 4 Hours       07/21/24 0043    07/21/24 0100  budesonide (PULMICORT) nebulizer solution 0.5 mg  2 Times Daily - RT         07/21/24 0044    07/21/24 0100  ipratropium-albuterol (DUO-NEB) nebulizer solution 3 mL  Every 6 Hours - RT         07/21/24 0044 07/21/24 0059  sodium chloride 0.9 % flush 10 mL  Every 12 Hours Scheduled         07/21/24 0043    07/21/24 0059  sennosides-docusate (PERICOLACE) 8.6-50 MG per tablet 2 tablet  2 Times Daily        Placed in \"And\" Linked Group    07/21/24 0043    07/21/24 0058  divalproex (DEPAKOTE) DR tablet 500 mg  2 Times Daily         07/21/24 0042    07/21/24 0058  levETIRAcetam (KEPPRA) tablet 500 mg  2 Times Daily        Note to Pharmacy: For " "tube route administration disperse crushed tablets in 10 mL of water, shake for 5 minutes to dissolve, and administer immediately via enteral feeding tube.    07/21/24 0042    07/21/24 0058  pregabalin (LYRICA) capsule 300 mg  2 Times Daily         07/21/24 0042    07/21/24 0044  ipratropium-albuterol (DUO-NEB) nebulizer solution 3 mL  Every 4 Hours PRN         07/21/24 0044    07/21/24 0044  PT Consult: Eval & Treat As Tolerated; Discharge Placement Assessment, Functional Mobility Below Baseline  Once         07/21/24 0043    07/21/24 0043  hydrALAZINE (APRESOLINE) injection 10 mg  Every 4 Hours PRN         07/21/24 0043    07/21/24 0043  polyethylene glycol (MIRALAX) packet 17 g  Daily PRN        Placed in \"And\" Linked Group    07/21/24 0043    07/21/24 0043  bisacodyl (DULCOLAX) EC tablet 5 mg  Daily PRN        Placed in \"And\" Linked Group    07/21/24 0043    07/21/24 0043  bisacodyl (DULCOLAX) suppository 10 mg  Daily PRN        Placed in \"And\" Linked Group    07/21/24 0043    07/21/24 0043  Intake & Output  Every Shift       07/21/24 0043    07/21/24 0043  Weigh patient  Once         07/21/24 0043    07/21/24 0043  Fall Precautions  Continuous         07/21/24 0043    07/21/24 0043  Insert Peripheral IV  Once         07/21/24 0043    07/21/24 0043  Saline Lock & Maintain IV Access  Continuous         07/21/24 0043    07/21/24 0043  Code Status and Medical Interventions:  Continuous         07/21/24 0043    07/21/24 0043  Diet: Cardiac; Healthy Heart (2-3 Na+); Fluid Consistency: Thin (IDDSI 0)  Diet Effective Now         07/21/24 0043    07/21/24 0043  Inpatient Case Management  Consult  Once        Provider:  (Not yet assigned)    07/21/24 0043    07/21/24 0043  OT Consult: Eval & Treat ADL Performance Below Baseline, Early Mobility Assessment  Once         07/21/24 0043    07/21/24 0042  Pharmacy to Dose enoxaparin (LOVENOX)  Continuous PRN         07/21/24 0043    07/21/24 0042  sodium " "chloride 0.9 % flush 10 mL  As Needed         07/21/24 0043    07/21/24 0042  sodium chloride 0.9 % infusion 40 mL  As Needed         07/21/24 0043    07/21/24 0042  acetaminophen (TYLENOL) tablet 650 mg  Every 4 Hours PRN        Placed in \"Or\" Linked Group    07/21/24 0043    07/21/24 0042  acetaminophen (TYLENOL) 160 MG/5ML oral solution 650 mg  Every 4 Hours PRN        Placed in \"Or\" Linked Group    07/21/24 0043    07/21/24 0042  acetaminophen (TYLENOL) suppository 650 mg  Every 4 Hours PRN        Placed in \"Or\" Linked Group    07/21/24 0043    07/21/24 0042  ondansetron (ZOFRAN) injection 4 mg  Every 6 Hours PRN         07/21/24 0043    07/21/24 0017  Initiate Observation Status  Once         07/21/24 0016    07/21/24 0011  losartan (COZAAR) tablet 25 mg  Once         07/20/24 2355    07/20/24 2321  Urinalysis, Microscopic Only - Straight Cath  Once         07/20/24 2320    07/20/24 2316  Scan Slide  Once,   Status:  Canceled         07/20/24 2315    07/20/24 2205  COVID-19 and FLU A/B PCR, 1 HR TAT - Swab, Nasopharynx  Once         07/20/24 2204    07/20/24 2205  CT Head Without Contrast  1 Time Imaging         07/20/24 2204    07/20/24 2204  CBC & Differential  Once         07/20/24 2204    07/20/24 2204  Comprehensive Metabolic Panel  Once         07/20/24 2204    07/20/24 2204  XR Chest 1 View  1 Time Imaging         07/20/24 2204    07/20/24 2204  Urinalysis With Culture If Indicated - Straight Cath  Once         07/20/24 2204    07/20/24 2204  CBC Auto Differential  PROCEDURE ONCE         07/20/24 2204 07/20/24 2149  Insert peripheral IV  Once         07/20/24 2148 07/20/24 2149  [place] Draw  Once         07/20/24 2148 07/20/24 2149  ECG 12 Lead Other; pain  Once         07/20/24 2148 07/20/24 2149  Green Top (Gel)  PROCEDURE ONCE         07/20/24 2148 07/20/24 2149  Lavender Top  PROCEDURE ONCE         07/20/24 2148 07/20/24 2149  Gold Top - SST  PROCEDURE ONCE         07/20/24 2148    " 07/20/24 2149  Light Blue Top  PROCEDURE ONCE         07/20/24 2148    07/20/24 2148  sodium chloride 0.9 % flush 10 mL  As Needed         07/20/24 2148    07/20/24 2145  Contact Adult Protection  Once         07/20/24 2145    Unscheduled  Up With Assistance  As Needed       07/21/24 0043                  Physician Progress Notes (most recent note)    No notes of this type exist for this encounter.       Consult Notes (most recent note)    No notes of this type exist for this encounter.
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
## 2024-07-22 LAB
ANION GAP SERPL CALCULATED.3IONS-SCNC: 17.1 MMOL/L (ref 5–15)
BUN SERPL-MCNC: 14 MG/DL (ref 6–20)
BUN/CREAT SERPL: 21.9 (ref 7–25)
CALCIUM SPEC-SCNC: 9.1 MG/DL (ref 8.6–10.5)
CHLORIDE SERPL-SCNC: 109 MMOL/L (ref 98–107)
CO2 SERPL-SCNC: 19.9 MMOL/L (ref 22–29)
CREAT SERPL-MCNC: 0.64 MG/DL (ref 0.57–1)
DEPRECATED RDW RBC AUTO: 42.4 FL (ref 37–54)
EGFRCR SERPLBLD CKD-EPI 2021: 107.1 ML/MIN/1.73
ERYTHROCYTE [DISTWIDTH] IN BLOOD BY AUTOMATED COUNT: 12.9 % (ref 12.3–15.4)
GLUCOSE SERPL-MCNC: 93 MG/DL (ref 65–99)
HCT VFR BLD AUTO: 42.3 % (ref 34–46.6)
HGB BLD-MCNC: 13.9 G/DL (ref 12–15.9)
MCH RBC QN AUTO: 29.3 PG (ref 26.6–33)
MCHC RBC AUTO-ENTMCNC: 32.9 G/DL (ref 31.5–35.7)
MCV RBC AUTO: 89.2 FL (ref 79–97)
PLATELET # BLD AUTO: 225 10*3/MM3 (ref 140–450)
PMV BLD AUTO: 11.8 FL (ref 6–12)
POTASSIUM SERPL-SCNC: 3.6 MMOL/L (ref 3.5–5.2)
POTASSIUM SERPL-SCNC: 3.8 MMOL/L (ref 3.5–5.2)
POTASSIUM SERPL-SCNC: 4 MMOL/L (ref 3.5–5.2)
RBC # BLD AUTO: 4.74 10*6/MM3 (ref 3.77–5.28)
SODIUM SERPL-SCNC: 146 MMOL/L (ref 136–145)
WBC NRBC COR # BLD AUTO: 6.09 10*3/MM3 (ref 3.4–10.8)

## 2024-07-22 PROCEDURE — 94664 DEMO&/EVAL PT USE INHALER: CPT

## 2024-07-22 PROCEDURE — 96372 THER/PROPH/DIAG INJ SC/IM: CPT

## 2024-07-22 PROCEDURE — 84132 ASSAY OF SERUM POTASSIUM: CPT | Performed by: STUDENT IN AN ORGANIZED HEALTH CARE EDUCATION/TRAINING PROGRAM

## 2024-07-22 PROCEDURE — 94799 UNLISTED PULMONARY SVC/PX: CPT

## 2024-07-22 PROCEDURE — 80048 BASIC METABOLIC PNL TOTAL CA: CPT | Performed by: FAMILY MEDICINE

## 2024-07-22 PROCEDURE — G0378 HOSPITAL OBSERVATION PER HR: HCPCS

## 2024-07-22 PROCEDURE — 85027 COMPLETE CBC AUTOMATED: CPT | Performed by: FAMILY MEDICINE

## 2024-07-22 PROCEDURE — 94761 N-INVAS EAR/PLS OXIMETRY MLT: CPT

## 2024-07-22 PROCEDURE — 97166 OT EVAL MOD COMPLEX 45 MIN: CPT

## 2024-07-22 PROCEDURE — 97530 THERAPEUTIC ACTIVITIES: CPT

## 2024-07-22 PROCEDURE — 25010000002 ENOXAPARIN PER 10 MG: Performed by: INTERNAL MEDICINE

## 2024-07-22 PROCEDURE — 97110 THERAPEUTIC EXERCISES: CPT

## 2024-07-22 PROCEDURE — 99232 SBSQ HOSP IP/OBS MODERATE 35: CPT | Performed by: STUDENT IN AN ORGANIZED HEALTH CARE EDUCATION/TRAINING PROGRAM

## 2024-07-22 RX ORDER — POTASSIUM CHLORIDE 20 MEQ/1
40 TABLET, EXTENDED RELEASE ORAL EVERY 4 HOURS
Status: COMPLETED | OUTPATIENT
Start: 2024-07-22 | End: 2024-07-22

## 2024-07-22 RX ORDER — PANTOPRAZOLE SODIUM 40 MG/1
40 TABLET, DELAYED RELEASE ORAL DAILY
Status: DISCONTINUED | OUTPATIENT
Start: 2024-07-22 | End: 2024-07-26 | Stop reason: HOSPADM

## 2024-07-22 RX ORDER — DANTROLENE SODIUM 25 MG/1
25 CAPSULE ORAL 2 TIMES DAILY
Status: DISCONTINUED | OUTPATIENT
Start: 2024-07-22 | End: 2024-07-26 | Stop reason: HOSPADM

## 2024-07-22 RX ORDER — HYDROCODONE BITARTRATE AND ACETAMINOPHEN 7.5; 325 MG/1; MG/1
1 TABLET ORAL EVERY 8 HOURS PRN
Status: DISCONTINUED | OUTPATIENT
Start: 2024-07-22 | End: 2024-07-26 | Stop reason: HOSPADM

## 2024-07-22 RX ADMIN — ACETAMINOPHEN 650 MG: 325 TABLET, FILM COATED ORAL at 21:11

## 2024-07-22 RX ADMIN — Medication 10 ML: at 08:13

## 2024-07-22 RX ADMIN — ASPIRIN 81 MG CHEWABLE TABLET 81 MG: 81 TABLET CHEWABLE at 08:12

## 2024-07-22 RX ADMIN — LEVETIRACETAM 500 MG: 500 TABLET, FILM COATED ORAL at 22:11

## 2024-07-22 RX ADMIN — LOSARTAN POTASSIUM 50 MG: 50 TABLET, FILM COATED ORAL at 08:13

## 2024-07-22 RX ADMIN — PANTOPRAZOLE SODIUM 40 MG: 40 TABLET, DELAYED RELEASE ORAL at 09:26

## 2024-07-22 RX ADMIN — LEVETIRACETAM 500 MG: 500 TABLET, FILM COATED ORAL at 11:28

## 2024-07-22 RX ADMIN — DIVALPROEX SODIUM 500 MG: 500 TABLET, DELAYED RELEASE ORAL at 11:28

## 2024-07-22 RX ADMIN — IPRATROPIUM BROMIDE AND ALBUTEROL SULFATE 3 ML: .5; 3 SOLUTION RESPIRATORY (INHALATION) at 07:01

## 2024-07-22 RX ADMIN — Medication 10 ML: at 21:12

## 2024-07-22 RX ADMIN — IPRATROPIUM BROMIDE AND ALBUTEROL SULFATE 3 ML: .5; 3 SOLUTION RESPIRATORY (INHALATION) at 19:21

## 2024-07-22 RX ADMIN — PREGABALIN 300 MG: 75 CAPSULE ORAL at 22:11

## 2024-07-22 RX ADMIN — PREGABALIN 300 MG: 75 CAPSULE ORAL at 11:28

## 2024-07-22 RX ADMIN — DANTROLENE SODIUM 25 MG: 25 CAPSULE ORAL at 09:26

## 2024-07-22 RX ADMIN — HYDROCODONE BITARTRATE AND ACETAMINOPHEN 1 TABLET: 7.5; 325 TABLET ORAL at 09:26

## 2024-07-22 RX ADMIN — BUDESONIDE 0.5 MG: 0.5 INHALANT RESPIRATORY (INHALATION) at 07:01

## 2024-07-22 RX ADMIN — CITALOPRAM HYDROBROMIDE 10 MG: 20 TABLET ORAL at 08:12

## 2024-07-22 RX ADMIN — POTASSIUM CHLORIDE 40 MEQ: 1500 TABLET, EXTENDED RELEASE ORAL at 08:12

## 2024-07-22 RX ADMIN — POTASSIUM CHLORIDE 40 MEQ: 1500 TABLET, EXTENDED RELEASE ORAL at 13:08

## 2024-07-22 RX ADMIN — DANTROLENE SODIUM 25 MG: 25 CAPSULE ORAL at 21:11

## 2024-07-22 RX ADMIN — IPRATROPIUM BROMIDE AND ALBUTEROL SULFATE 3 ML: .5; 3 SOLUTION RESPIRATORY (INHALATION) at 12:00

## 2024-07-22 RX ADMIN — DIVALPROEX SODIUM 500 MG: 500 TABLET, DELAYED RELEASE ORAL at 22:11

## 2024-07-22 RX ADMIN — METOPROLOL SUCCINATE 50 MG: 25 TABLET, EXTENDED RELEASE ORAL at 08:13

## 2024-07-22 RX ADMIN — BUDESONIDE 0.5 MG: 0.5 INHALANT RESPIRATORY (INHALATION) at 19:21

## 2024-07-22 RX ADMIN — IPRATROPIUM BROMIDE AND ALBUTEROL SULFATE 3 ML: .5; 3 SOLUTION RESPIRATORY (INHALATION) at 00:31

## 2024-07-22 RX ADMIN — FAMOTIDINE 20 MG: 20 TABLET, FILM COATED ORAL at 08:13

## 2024-07-22 RX ADMIN — ENOXAPARIN SODIUM 40 MG: 100 INJECTION SUBCUTANEOUS at 06:12

## 2024-07-22 NOTE — PLAN OF CARE
Goal Outcome Evaluation:  Plan of Care Reviewed With: patient        Progress: improving  Outcome Evaluation: Pt  supine in bed and willing to participate with treatment. Pt performed bed mobility with min a. Pt with use of lourdes walker performed STS with min a and pt performed pivot transfer from bed to chair with min a with lourdes walker. Pt performed exercises per flowsheet. Cont PT per POC progressing to goals as pt tolerates.

## 2024-07-22 NOTE — THERAPY EVALUATION
Patient Name: Roxi Valerio  : 1975    MRN: [id number]                              Today's Date: 2024       Admit Date: 2024    Visit Dx:     ICD-10-CM ICD-9-CM   1. Unable to care for self  Z78.9 V49.89     Patient Active Problem List   Diagnosis    Influenza A    Unable to care for self    Hypertensive urgency     Past Medical History:   Diagnosis Date    COPD (chronic obstructive pulmonary disease)     Hypertension     Paralysis due to old stroke     right sided    Stroke     TBI (traumatic brain injury)      Past Surgical History:   Procedure Laterality Date    BRAIN SURGERY      TUBAL ABDOMINAL LIGATION        General Information       Row Name 24 1606          OT Time and Intention    Document Type evaluation  -DB     Mode of Treatment occupational therapy  -DB       Row Name 24 1606          General Information    Patient Profile Reviewed yes  -DB     Prior Level of Function max assist:;ADL's;dependent:  -DB     Existing Precautions/Restrictions fall;seizures  -DB     Barriers to Rehab medically complex;previous functional deficit;cognitive status  -DB       Row Name 24 1606          Occupational Profile    Reason for Services/Referral (Occupational Profile) ADL decline  -DB       Row Name 24 1606          Living Environment    People in Home spouse;child(beti), adult;grandchild(beti)  -DB       Row Name 24 1606          Home Main Entrance    Number of Stairs, Main Entrance none  -DB       Row Name 24 1606          Cognition    Orientation Status (Cognition) oriented to;person;unable/difficult to assess  -DB       Row Name 24 1606          Safety Issues, Functional Mobility    Safety Issues Affecting Function (Mobility) friction/shear risk;safety precaution awareness;safety precautions follow-through/compliance;insight into deficits/self-awareness;problem-solving  -DB     Impairments Affecting Function (Mobility) balance;cognition;grasp;muscle tone  abnormal;motor planning;range of motion (ROM);strength;endurance/activity tolerance;coordination;motor control;pain  -DB     Cognitive Impairments, Mobility Safety/Performance safety precaution awareness;safety precaution follow-through;sequencing abilities  -DB               User Key  (r) = Recorded By, (t) = Taken By, (c) = Cosigned By      Initials Name Provider Type    DB Michael Schofield OT Occupational Therapist                     Mobility/ADL's       Row Name 07/22/24 1610          Bed Mobility    Bed Mobility supine-sit  -DB     Supine-Sit [place] (Bed Mobility) minimum assist (75% patient effort);verbal cues;nonverbal cues (demo/gesture)  -DB     Assistive Device (Bed Mobility) head of bed elevated;bed rails  -DB       Row Name 07/22/24 1610          Bed-Chair Transfer    Bed-Chair [place] (Transfers) minimum assist (75% patient effort);nonverbal cues (demo/gesture);verbal cues  -DB     Assistive Device (Bed-Chair Transfers) walker, lourdes  -DB       Row Name 07/22/24 1610          Sit-Stand Transfer    Sit-Stand [place] (Transfers) minimum assist (75% patient effort);nonverbal cues (demo/gesture);verbal cues  -DB     Assistive Device (Sit-Stand Transfers) walker, lourdes  -DB       Row Name 07/22/24 1610          Functional Mobility    Patient was able to Ambulate no, other medical factors prevent ambulation  -DB       Row Name 07/22/24 1610          Activities of Daily Living    BADL Assessment/Intervention bathing;upper body dressing;lower body dressing;grooming;feeding;toileting  -DB       Row Name 07/22/24 1610          Bathing Assessment/Intervention    [place] Level (Bathing) bathing skills;maximum assist (25% patient effort)  -DB       Row Name 07/22/24 1610          Upper Body Dressing Assessment/Training    [place] Level (Upper Body Dressing) upper body dressing skills;maximum assist (25% patient effort)  -DB       Row Name 07/22/24 1610          Lower Body Dressing  Assessment/Training    [place] Level (Lower Body Dressing) lower body dressing skills;maximum assist (25% patient effort)  -DB       Row Name 07/22/24 1610          Grooming Assessment/Training    [place] Level (Grooming) grooming skills;hair care, combing/brushing;wash face, hands;set up;verbal cues;nonverbal cues (demo/gesture)  -DB       Row Name 07/22/24 1610          Toileting Assessment/Training    [place] Level (Toileting) toileting skills;change pad/brief;perform perineal hygiene;maximum assist (25% patient effort)  -DB               User Key  (r) = Recorded By, (t) = Taken By, (c) = Cosigned By      Initials Name Provider Type    DB Michael Schofield OT Occupational Therapist                   Obj/Interventions       Row Name 07/22/24 1612          Range of Motion Comprehensive    General Range of Motion upper extremity range of motion deficits identified  -DB     Comment, General Range of Motion Increased spasticity of RUE. (R) hand flexed. Placed rolled up wash cloth in hand and educated pt on need for splint for contracture mgmt.  -DB       Row Name 07/22/24 1612          Strength Comprehensive (MMT)    General Manual Muscle Testing (MMT) Assessment upper extremity strength deficits identified  -DB     Comment, General Manual Muscle Testing (MMT) Assessment LUE grossly 3+/5, RUE-0/5  -DB               User Key  (r) = Recorded By, (t) = Taken By, (c) = Cosigned By      Initials Name Provider Type    Michael Latham OT Occupational Therapist                   Goals/Plan       Row Name 07/22/24 1621          Bed Mobility Goal 1 (OT)    Activity/Assistive Device (Bed Mobility Goal 1, OT) bed mobility activities, all  -DB     [place] Level/Cues Needed (Bed Mobility Goal 1, OT) standby assist  -DB     Time Frame (Bed Mobility Goal 1, OT) by discharge  -DB     Progress/Outcomes (Bed Mobility Goal 1, OT) goal ongoing  -DB       Row Name 07/22/24 1621          Transfer Goal 1  (OT)    Activity/Assistive Device (Transfer Goal 1, OT) toilet;walker, lourdes  -DB     [place] Level/Cues Needed (Transfer Goal 1, OT) contact guard required  -DB     Time Frame (Transfer Goal 1, OT) by discharge  -DB     Progress/Outcome (Transfer Goal 1, OT) goal ongoing  -DB       Row Name 07/22/24 1621          Bathing Goal 1 (OT)    Activity/Device (Bathing Goal 1, OT) upper body bathing  -DB     [place] Level/Cues Needed (Bathing Goal 1, OT) set-up required  -DB     Time Frame (Bathing Goal 1, OT) by discharge  -DB     Progress/Outcomes (Bathing Goal 1, OT) goal ongoing  -DB       Row Name 07/22/24 1621          Toileting Goal 1 (OT)    Activity/Device (Toileting Goal 1, OT) toileting skills, all  -DB     [place] Level/Cues Needed (Toileting Goal 1, OT) minimum assist (75% or more patient effort)  -DB     Time Frame (Toileting Goal 1, OT) by discharge  -DB     Progress/Outcome (Toileting Goal 1, OT) goal ongoing  -DB       Row Name 07/22/24 1621          Grooming Goal 1 (OT)    Activity/Device (Grooming Goal 1, OT) grooming skills, all  -DB     [place] (Grooming Goal 1, OT) modified independence  -DB     Time Frame (Grooming Goal 1, OT) by discharge  -DB     Progress/Outcome (Grooming Goal 1, OT) goal ongoing  -DB       Row Name 07/22/24 1621          ROM Goal 1 (OT)    ROM Goal 1 (OT) Pt to tolerate PROM to RUE for contracture mgmt  -DB     Time Frame (ROM Goal 1, OT) long term goal (LTG)  -DB     Progress/Outcome (ROM Goal 1, OT) goal ongoing  -DB       Row Name 07/22/24 1621          Strength Goal 1 (OT)    Strength Goal 1 (OT) Pt to tolerate resistance band exercises to increase strength in LUE  -DB     Time Frame (Strength Goal 1, OT) long term goal (LTG)  -DB     Progress/Outcome (Strength Goal 1, OT) goal ongoing  -DB       Row Name 07/22/24 1621          Therapy Assessment/Plan (OT)    Planned Therapy Interventions (OT) activity tolerance training;adaptive equipment training;BADL  "retraining;functional balance retraining;occupation/activity based interventions;ROM/therapeutic exercise;strengthening exercise;transfer/mobility retraining;neuromuscular control/coordination retraining;passive ROM/stretching;patient/caregiver education/training  -DB               User Key  (r) = Recorded By, (t) = Taken By, (c) = Cosigned By      Initials Name Provider Type    DB Michael Schofield OT Occupational Therapist                   Clinical Impression       Row Name 07/22/24 1614          Pain Assessment    Pain Intervention(s) Repositioned  -DB       Row Name 07/22/24 1614          Pain Scale: FACES Pre/Post-Treatment    Pain: FACES Scale, Pretreatment 8-->hurts whole lot  -DB     Posttreatment Pain Rating 8-->hurts whole lot  -DB     Pain Location - head  -DB       Row Name 07/22/24 1614          Plan of Care Review    Plan of Care Reviewed With patient  -DB     Progress no change  -DB     Outcome Evaluation OT eval completed. Pt able to nod 'yes\" when asked if she was doing ok this date. Pt able to complete (L) <> (R) bed mobility with min A to complete brief change. Pt is max A for brief change and perineal hygiene. Pt able to complete supine to sit EOB with min A. Pt requires max A to don (B) socks. Pt able to complete STS with min A and lourdes walker. Pt able to complete stand pivot transfer bed to chair with min A and lourdes walker. Pt able to perform grooming routine of face washing and combing hair with set up and demonstration. Pt RUE is limited in ROM and noted to have increased spasticity. (R) hand flexed. OT placed rolled up wash cloth in hand for contracture mgmt. OT educated pt to remove wash cloth if it became painful. Pt able to nod head yes to verbalize understanding. Pt has potential to be more (I) with therapy when OT goals are addressed. Pt to benefit from skilled OT services to address ROM, strength, endurance, transfers, and ADL mgmt. Pt to benefit from d/c to STR/LTC once " medically appropriate.  -DB       Row Name 07/22/24 1614          Therapy Assessment/Plan (OT)    Rehab Potential (OT) good, to achieve stated therapy goals  -DB     Criteria for Skilled Therapeutic Interventions Met (OT) yes;skilled treatment is necessary  -DB     Therapy Frequency (OT) 5 times/wk  -DB       Row Name 07/22/24 1614          Therapy Plan Review/Discharge Plan (OT)    Anticipated Discharge Disposition (OT) inpatient rehabilitation facility;[facility] ([facility])  -DB       Row Name 07/22/24 1614          Vital Signs    O2 Delivery Pre Treatment room air  -DB     O2 Delivery Intra Treatment room air  -DB     O2 Delivery Post Treatment room air  -DB     Pre Patient Position Supine  -DB     Intra Patient Position Standing  -DB     Post Patient Position Sitting  -DB       Row Name 07/22/24 1614          Positioning and Restraints    Pre-Treatment Position in bed  -DB     Post Treatment Position chair  -DB     In Chair notified nsg;reclined;call light within reach;encouraged to call for assist;exit alarm on  -DB               User Key  (r) = Recorded By, (t) = Taken By, (c) = Cosigned By      Initials Name Provider Type    DB Michael Schofield, OT Occupational Therapist                   Outcome Measures       Row Name 07/22/24 1622          How much help from another is currently needed...    Putting on and taking off regular lower body clothing? 2  -DB     Bathing (including washing, rinsing, and drying) 2  -DB     Toileting (which includes using toilet bed pan or urinal) 2  -DB     Putting on and taking off regular upper body clothing 2  -DB     Taking care of personal grooming (such as brushing teeth) 2  -DB     Eating meals 3  -DB     AM-PAC 6 Clicks Score (OT) 13  -DB       Row Name 07/22/24 1535 07/22/24 0800       How much help from another person do you currently need...    Turning from your back to your side while in flat bed without using bedrails? 3  -RM 3  -HN    Moving from lying  on back to sitting on the side of a flat bed without bedrails? 3  -RM 2  -HN    Moving to and from a bed to a chair (including a wheelchair)? 3  -RM 3  -HN    Standing up from a chair using your arms (e.g., wheelchair, bedside chair)? 3  -RM 3  -HN    Climbing 3-5 steps with a railing? 1  -RM 1  -HN    To walk in hospital room? 1  -RM 1  -HN    AM-PAC 6 Clicks Score (PT) 14  -RM 13  -HN    Highest Level of Mobility Goal 4 --> Transfer to chair/commode  -RM 4 --> Transfer to chair/commode  -HN      Row Name 07/22/24 1622 07/22/24 1535       Functional Assessment    Outcome Measure Options AM-PAC 6 Clicks Daily Activity (OT)  -DB AM-PAC 6 Clicks Basic Mobility (PT)  -RM              User Key  (r) = Recorded By, (t) = Taken By, (c) = Cosigned By      Initials Name Provider Type    Eliceo López, PTA Physical Therapist Assistant    Krystal Son, RN Registered Nurse    Michael Latham, OT Occupational Therapist                    Occupational Therapy Education       Title: PT OT SLP Therapies (In Progress)       Topic: Occupational Therapy (In Progress)       Point: ADL training (Done)       Description:   Instruct learner(s) on proper safety adaptation and remediation techniques during self care or transfers.   Instruct in proper use of assistive devices.                  Learning Progress Summary             Patient Acceptance, E,TB, VU,NR by GREGORY at 7/22/2024 1623    Comment: Pt educated on ADL retraining. Pt will require further education for follow through.                         Point: Home exercise program (Not Started)       Description:   Instruct learner(s) on appropriate technique for monitoring, assisting and/or progressing therapeutic exercises/activities.                  Learner Progress:  Not documented in this visit.              Point: Precautions (Not Started)       Description:   Instruct learner(s) on prescribed precautions during self-care and functional transfers.              "     Learner Progress:  Not documented in this visit.              Point: Body mechanics (Not Started)       Description:   Instruct learner(s) on proper positioning and spine alignment during self-care, functional mobility activities and/or exercises.                  Learner Progress:  Not documented in this visit.                              User Key       Initials Effective Dates Name Provider Type Discipline     07/06/23 -  Michael Schofield OT Occupational Therapist OT                  OT Recommendation and Plan  Planned Therapy Interventions (OT): activity tolerance training, adaptive equipment training, BADL retraining, functional balance retraining, occupation/activity based interventions, ROM/therapeutic exercise, strengthening exercise, transfer/mobility retraining, neuromuscular control/coordination retraining, passive ROM/stretching, patient/caregiver education/training  Therapy Frequency (OT): 5 times/wk  Plan of Care Review  Plan of Care Reviewed With: patient  Progress: no change  Outcome Evaluation: OT eval completed. Pt able to nod 'yes\" when asked if she was doing ok this date. Pt able to complete (L) <> (R) bed mobility with min A to complete brief change. Pt is max A for brief change and perineal hygiene. Pt able to complete supine to sit EOB with min A. Pt requires max A to don (B) socks. Pt able to complete STS with min A and lourdes walker. Pt able to complete stand pivot transfer bed to chair with min A and lourdes walker. Pt able to perform grooming routine of face washing and combing hair with set up and demonstration. Pt RUE is limited in ROM and noted to have increased spasticity. (R) hand flexed. OT placed rolled up wash cloth in hand for contracture mgmt. OT educated pt to remove wash cloth if it became painful. Pt able to nod head yes to verbalize understanding. Pt has potential to be more (I) with therapy when OT goals are addressed. Pt to benefit from skilled OT services to " address ROM, strength, endurance, transfers, and ADL mgmt. Pt to benefit from d/c to STR/LTC once medically appropriate.     Time Calculation:   Evaluation Complexity (OT)  Review Occupational Profile/Medical/Therapy History Complexity: expanded/moderate complexity  Assessment, Occupational Performance/Identification of Deficit Complexity: 3-5 performance deficits  Clinical Decision Making Complexity (OT): detailed assessment/moderate complexity  Overall Complexity of Evaluation (OT): moderate complexity     Time Calculation- OT       Row Name 07/22/24 1623             Time Calculation- OT    OT Start Time 1337  -DB      OT Received On 07/22/24  -DB      OT Goal Re-Cert Due Date 08/01/24  -DB         Untimed Charges    OT Eval/Re-eval Minutes 40  -DB         Total Minutes    Untimed Charges Total Minutes 40  -DB       Total Minutes 40  -DB                User Key  (r) = Recorded By, (t) = Taken By, (c) = Cosigned By      Initials Name Provider Type    DB Michael Schofield, CELIO Occupational Therapist                  Therapy Charges for Today       Code Description Service Date Service Provider Modifiers Qty    [id number] HC OT EVAL MOD COMPLEXITY 3 7/22/2024 Michael Schofield OT GO 1                 Michael Schofield OT  7/22/2024

## 2024-07-22 NOTE — DISCHARGE PLACEMENT REQUEST
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 Recorded By, (t) = Taken By, (c) = Cosigned By      Initials Name Provider Type    TW Cherelle Herrera PT Physical Therapist    Krystal Son, RN Registered Nurse                                 Physical Therapy Education       Title: PT OT SLP Therapies (In Progress)       Topic: Physical Therapy (In Progress)       Point: Mobility training (Done)       Learning Progress Summary             Patient Acceptance, E, VU by TW at 7/21/2024 1103    Comment: pt education on purpose of PT evaluation and her inpt PT POC                         Point: Home exercise program (Not Started)       Learner Progress:  Not documented in this visit.              Point: Body mechanics (Not Started)       Learner Progress:  Not documented in this visit.              Point: Precautions (Not Started)       Learner Progress:  Not documented in this visit.                              User Key       Initials Effective Dates Name Provider Type Discipline     06/16/21 -  Cherelle Herrera PT Physical Therapist PT                  PT Recommendation and Plan  Planned Therapy Interventions (PT): balance training, bed mobility training, transfer training, patient/family education, strengthening  Plan  of Care Reviewed With: patient  Outcome Evaluation: PT evaluation completed this date with pt presenting supine in bed while on room air. Pt expressively aphasic but did say yes/no to simple questions. No family present and pt's prior functional mobility level not clear. Pt did indicate she had w/c at home and hemiwalker. This morning, pt indicated she did have a headache rated 7/10. Pt presented with R sided spasticity in an extensor pattern with no isolated RUE movement observed and with pt expressing pain with R shoulder movement. RLE has limited R dorsiflexion and tends to pull into supination with mobility. Pt needed mod assist to come to sit on EOB and once in sitting could maintain midline with close SBA x 6 min. Pt came to stand with hemiwalker and min assist with most of her wt through the LLE. Pt pivoted to chair to the L on her LLE with limited isolated RLE movement and extensor tone assisting in keeping R knee from buckling. Pt indicates she does not have a AFO for RLE. Pt does present with deficits in isolated strength on R side, decreased balance, and endurance with activity. Pt is expected to improve her functional mobility with continued PT services prior to d/c.     Time Calculation:   PT Evaluation Complexity  History, PT Evaluation Complexity: 3 or more personal factors and/or comorbidities  Examination of Body Systems (PT Eval Complexity): total of 4 or more elements  Clinical Presentation (PT Evaluation Complexity): stable  Clinical Decision Making (PT Evaluation Complexity): low complexity  Overall Complexity (PT Evaluation Complexity): low complexity     PT Charges       Row Name 07/21/24 1103             Time Calculation    Stop Time 1103  -TW      PT Received On 07/21/24  -TW      PT Goal Re-Cert Due Date 07/31/24  -TW                User Key  (r) = Recorded By, (t) = Taken By, (c) = Cosigned By      Initials Name Provider Type    Cherelle Smith, PT Physical Therapist                   Therapy Charges for Today       Code Description Service Date Service Provider Modifiers Qty    04438169471 HC PT EVAL LOW COMPLEXITY 3 7/21/2024 Cherelle Herrera, PT GP 1            PT G-Codes  Outcome Measure Options: AM-PAC 6 Clicks Basic Mobility (PT)  AM-PAC 6 Clicks Score (PT): 13  PT Discharge Summary  Anticipated Discharge Disposition (PT): other (see comments) (Not clear at this time. Pt does need assist with all mobility and ADLs.)    Cherelle Herrera PT  7/21/2024      Electronically signed by Cherelle Herrera PT at 07/21/24 1434       Occupational Therapy Notes (all)    No notes exist for this encounter.

## 2024-07-22 NOTE — CASE MANAGEMENT/SOCIAL WORK
1030: CM spoke with pt at bedside. Alert but unable to answers any questions at this time. All answers where yes to all questions.     1600: CM spoke with  per phone. /Jose Manuel stated wants [facility] for [facility]. Wants to bring home when able. SW updated on DCP.

## 2024-07-22 NOTE — PLAN OF CARE
"Goal Outcome Evaluation:  Plan of Care Reviewed With: patient        Progress: no change  Outcome Evaluation: OT eval completed. Pt able to nod 'yes\" when asked if she was doing ok this date. Pt able to complete (L) <> (R) bed mobility with min A to complete brief change. Pt is max A for brief change and perineal hygiene. Pt able to complete supine to sit EOB with min A. Pt requires max A to don (B) socks. Pt able to complete STS with min A and lourdes walker. Pt able to complete stand pivot transfer bed to chair with min A and lourdes walker. Pt able to perform grooming routine of face washing and combing hair with set up and demonstration. Pt RUE is limited in ROM and noted to have increased spasticity. (R) hand flexed. OT placed rolled up wash cloth in hand for contracture mgmt. OT educated pt to remove wash cloth if it became painful. Pt able to nod head yes to verbalize understanding. Pt has potential to be more (I) with therapy when OT goals are addressed. Pt to benefit from skilled OT services to address ROM, strength, endurance, transfers, and ADL mgmt. Pt to benefit from d/c to STR/LTC once medically appropriate.      Anticipated Discharge Disposition (OT): inpatient rehabilitation facility, LTCH (long-term care hospital)                        "

## 2024-07-22 NOTE — PLAN OF CARE
Goal Outcome Evaluation:  Plan of Care Reviewed With: patient           Outcome Evaluation: Patient had no acute events today. Awaiting placement at this time.

## 2024-07-22 NOTE — PROGRESS NOTES
"Dietitian Assessment    Patient Name: Roxi Valerio  YOB: 1975  MRN: [id number]  Admission date: 7/20/2024    Comment:        Clinical Nutrition Assessment      Reason for Assessment MST=3   H&P  Past Medical History:   Diagnosis Date    COPD (chronic obstructive pulmonary disease)     Hypertension     Paralysis due to old stroke     right sided    Stroke     TBI (traumatic brain injury)        Past Surgical History:   Procedure Laterality Date    BRAIN SURGERY      TUBAL ABDOMINAL LIGATION              Current Problems        Encounter Information        Trending Narrative     7/22: Pt admitted d/t hypertensive urgency. EMR shows no significant wt changes at this time to malnutrition criteria. Pt with good PO intake, average 93%. ONS not warranted at this time. RD to f/up PRN.     Anthropometrics        Current Height, Weight Height: 154.9 cm (61\")  Weight: 94.4 kg (208 lb 1.8 oz) (07/21/24 0059)   Trending Weight Hx     This admission:              PTA:     Wt Readings from Last 30 Encounters:   07/21/24 0059 94.4 kg (208 lb 1.8 oz)   07/20/24 2145 93 kg (205 lb 0.4 oz)   10/29/23 2334 93 kg (205 lb)   12/30/22 1108 92.5 kg (204 lb)   11/27/22 0930 92.5 kg (204 lb)   11/06/22 1404 92.5 kg (204 lb)   11/05/22 0358 99.3 kg (218 lb 14.7 oz)   11/05/22 0051 98 kg (216 lb 0.8 oz)   04/20/21 1248 84.8 kg (187 lb)   07/03/19 2359 90 kg (198 lb 6.4 oz)      BMI kg/m2 Body mass index is 39.32 kg/m².     Labs        Pertinent Labs     Results from last 7 days   Lab Units 07/22/24  0629 07/21/24  2320 07/21/24  0643 07/20/24  2304   SODIUM mmol/L 146*  --  145 144   POTASSIUM mmol/L 3.6 3.8 2.6* 4.6   CHLORIDE mmol/L 109*  --  109* 108*   CO2 mmol/L 19.9*  --  23.1 23.2   BUN mg/dL 14  --  11 12   CREATININE mg/dL 0.64  --  0.67 0.72   CALCIUM mg/dL 9.1  --  9.0 9.0   BILIRUBIN mg/dL  --   --   --  0.3   ALK PHOS U/L  --   --   --  103   ALT (SGPT) U/L  --   --   --  14   AST (SGOT) U/L  --   --   --  17 "   GLUCOSE mg/dL 93  --  89 91       Results from last 7 days   Lab Units 07/22/24  0629 07/21/24  0643   MAGNESIUM mg/dL  --  1.8   HEMOGLOBIN g/dL 13.9 14.0   HEMATOCRIT % 42.3 41.9       Lab Results   Component Value Date    HGBA1C 5.4 12/17/2022            Medications       Scheduled Medications aspirin, 81 mg, Oral, Daily  budesonide, 0.5 mg, Nebulization, BID - RT  citalopram, 10 mg, Oral, Daily  dantrolene, 25 mg, Oral, BID  divalproex, 500 mg, Oral, BID  enoxaparin, 40 mg, Subcutaneous, Q24H  famotidine, 20 mg, Oral, Daily  ipratropium-albuterol, 3 mL, Nebulization, Q6H - RT  levETIRAcetam, 500 mg, Oral, BID  losartan, 50 mg, Oral, Daily  metoprolol succinate XL, 50 mg, Oral, Daily  pantoprazole, 40 mg, Oral, Daily  potassium chloride ER, 40 mEq, Oral, Q4H  pregabalin, 300 mg, Oral, BID  senna-docusate sodium, 2 tablet, Oral, BID  sodium chloride, 10 mL, Intravenous, Q12H        Infusions Pharmacy to Dose enoxaparin (LOVENOX),          PRN Medications   acetaminophen **OR** acetaminophen **OR** acetaminophen    senna-docusate sodium **AND** polyethylene glycol **AND** bisacodyl **AND** bisacodyl    hydrALAZINE    HYDROcodone-acetaminophen    ipratropium-albuterol    labetalol    Magnesium Standard Dose Replacement - Follow Nurse / BPA Driven Protocol    ondansetron    Pharmacy to Dose enoxaparin (LOVENOX)    Potassium Replacement - Follow Nurse / BPA Driven Protocol    sodium chloride    sodium chloride    sodium chloride     Physical Findings        Trending Physical   Appearance, NFPE    --  Edema  None noted     Bowel Function LBM: 7/21     Tubes Peripheral IV     Chewing/Swallowing WNL     Skin No PI noted       Estimated/Assessed Needs       Energy Requirements    EST Needs, Method, Wt used 3257-7546 kcal (MSJ 1.2-1.3)       Protein Requirements    EST Needs, Method, Wt used 75-94 g pro (.8-1 g pro/kg CBW)       Fluid Requirements     Estimated Needs (mL/day) 2250-2400 mL       Current Nutrition Orders &  Evaluation of Intake       Oral Nutrition     Food Allergies NKFA   Current PO Diet Diet: Cardiac; Healthy Heart (2-3 Na+); Fluid Consistency: Thin (IDDSI 0)   Supplement    PO Evaluation     Trending % PO Intake 93% x 4 meals       Nutrition Diagnosis         Nutrition Dx Problem 1 Obesity r/t lifestyle/TBI AEB BMI=39.32.      Nutrition Dx Problem 2        Intervention Goal         Intervention Goal(s) PO intake meet >50% of estimated needs  No significant wt changes     Nutrition Intervention        RD Action No action at this time     Nutrition Prescription          Diet Prescription HH   Supplement Prescription      Enteral Prescription        TPN Prescription      Monitor/Evaluation        Monitor Per protocol, I&O, PO intake, Pertinent labs, Weight, Skin status, GI status, Symptoms, POC/GOC, Swallow function, Hemodynamic stability     RD to f/up PRN    Electronically signed by:  Glory Connor RD  07/22/24 10:13 EDT

## 2024-07-22 NOTE — THERAPY TREATMENT NOTE
Patient Name: Roxi Valerio  : 1975    MRN: [id number]                              Today's Date: 2024       Admit Date: 2024    Visit Dx:     ICD-10-CM ICD-9-CM   1. Unable to care for self  Z78.9 V49.89     Patient Active Problem List   Diagnosis    Influenza A    Unable to care for self    Hypertensive urgency     Past Medical History:   Diagnosis Date    COPD (chronic obstructive pulmonary disease)     Hypertension     Paralysis due to old stroke     right sided    Stroke     TBI (traumatic brain injury)      Past Surgical History:   Procedure Laterality Date    BRAIN SURGERY      TUBAL ABDOMINAL LIGATION        General Information       Row Name 24 1526          Physical Therapy Time and Intention    Document Type therapy note (daily note)  -     Mode of Treatment physical therapy  -       Row Name 24 1526          General Information    Patient Profile Reviewed yes  -RM     Existing Precautions/Restrictions fall;seizures  -       Row Name 24 1526          Cognition    Orientation Status (Cognition) oriented to;person;unable/difficult to assess  -       Row Name 24 1526          Safety Issues, Functional Mobility    Safety Issues Affecting Function (Mobility) friction/shear risk;problem-solving  -     Impairments Affecting Function (Mobility) balance;cognition;grasp;muscle tone abnormal;motor planning;range of motion (ROM);strength;endurance/activity tolerance;coordination;motor control;pain  -RM               User Key  (r) = Recorded By, (t) = Taken By, (c) = Cosigned By      Initials Name Provider Type     Eliceo Matos, PTA Physical Therapist Assistant                   Mobility       Row Name 24 1528          Bed Mobility    Supine-Sit [place] (Bed Mobility) minimum assist (75% patient effort);verbal cues;nonverbal cues (demo/gesture)  -     Assistive Device (Bed Mobility) head of bed elevated;bed rails  -       Row Name 24 1528           Bed-Chair Transfer    Bed-Chair [place] (Transfers) minimum assist (75% patient effort);nonverbal cues (demo/gesture);verbal cues  -RM     Assistive Device (Bed-Chair Transfers) walker, lourdes  -RM       Row Name 07/22/24 1528          Sit-Stand Transfer    Sit-Stand [place] (Transfers) minimum assist (75% patient effort);nonverbal cues (demo/gesture);verbal cues  -RM     Assistive Device (Sit-Stand Transfers) walker, lourdes  -RM       Row Name 07/22/24 1528          Gait/Stairs (Locomotion)    [place] Level (Gait) not tested  -RM               User Key  (r) = Recorded By, (t) = Taken By, (c) = Cosigned By      Initials Name Provider Type    Eliceo López, MITCH Physical Therapist Assistant                   Obj/Interventions       Row Name 07/22/24 1529          Motor Skills    Motor Skills --  AROM L LE x 10 reps all planes , R LE AAROM x 10 reps all planes  -RM               User Key  (r) = Recorded By, (t) = Taken By, (c) = Cosigned By      Initials Name Provider Type    Eliceo López, MITCH Physical Therapist Assistant                   Goals/Plan    No documentation.                  Clinical Impression       Row Name 07/22/24 1531          Pain    Additional Documentation Pain Scale: FACES Pre/Post-Treatment (Group)  -RM       Row Name 07/22/24 1531          Pain Scale: FACES Pre/Post-Treatment    Pain: FACES Scale, Pretreatment 8-->hurts whole lot  -RM     Posttreatment Pain Rating 8-->hurts whole lot  -RM     Pain Location - head  -RM       Row Name 07/22/24 1531          Plan of Care Review    Plan of Care Reviewed With patient  -RM     Progress improving  -RM     Outcome Evaluation Pt  supine in bed and willing to participate with treatment. Pt performed bed mobility with min a. Pt with use of lourdes walker performed STS with min a and pt performed pivot transfer from bed to chair with min a with lourdes walker. Pt performed exercises per flowsheet. Cont PT per POC progressing to  goals as pt tolerates.  -RM               User Key  (r) = Recorded By, (t) = Taken By, (c) = Cosigned By      Initials Name Provider Type    Eliceo López, MITCH Physical Therapist Assistant                   Outcome Measures       Row Name 07/22/24 1535 07/22/24 0800       How much help from another person do you currently need...    Turning from your back to your side while in flat bed without using bedrails? 3  -RM 3  -HN    Moving from lying on back to sitting on the side of a flat bed without bedrails? 3  -RM 2  -HN    Moving to and from a bed to a chair (including a wheelchair)? 3  -RM 3  -HN    Standing up from a chair using your arms (e.g., wheelchair, bedside chair)? 3  -RM 3  -HN    Climbing 3-5 steps with a railing? 1  -RM 1  -HN    To walk in hospital room? 1  -RM 1  -HN    AM-PAC 6 Clicks Score (PT) 14  -RM 13  -HN    Highest Level of Mobility Goal 4 --> Transfer to chair/commode  -RM 4 --> Transfer to chair/commode  -HN      Row Name 07/22/24 1535          Functional Assessment    Outcome Measure Options AM-PAC 6 Clicks Basic Mobility (PT)  -RM               User Key  (r) = Recorded By, (t) = Taken By, (c) = Cosigned By      Initials Name Provider Type    Eliceo López, MITCH Physical Therapist Assistant    Krystal Son RN Registered Nurse                                 Physical Therapy Education       Title: PT OT SLP Therapies (In Progress)       Topic: Physical Therapy (In Progress)       Point: Mobility training (Done)       Learning Progress Summary             Patient Acceptance, E,TB,D, VU,NR by  at 7/22/2024 1536    Acceptance, E, VU by  at 7/21/2024 1103    Comment: pt education on purpose of PT evaluation and her inpt PT POC                         Point: Home exercise program (Done)       Learning Progress Summary             Patient Acceptance, E,TB,D, VU,NR by  at 7/22/2024 1536                         Point: Body mechanics (Not Started)       Learner Progress:  Not  documented in this visit.              Point: Precautions (Not Started)       Learner Progress:  Not documented in this visit.                              User Key       Initials Effective Dates Name Provider Type Discipline    RM 06/16/21 -  Eliceo Matos PTA Physical Therapist Assistant PT    TW 06/16/21 -  Cherelle Herrera, PT Physical Therapist PT                  PT Recommendation and Plan     Plan of Care Reviewed With: patient  Progress: improving  Outcome Evaluation: Pt  supine in bed and willing to participate with treatment. Pt performed bed mobility with min a. Pt with use of lourdes walker performed STS with min a and pt performed pivot transfer from bed to chair with min a with lourdes walker. Pt performed exercises per flowsheet. Cont PT per POC progressing to goals as pt tolerates.     Time Calculation:         PT Charges       Row Name 07/22/24 1536             Time Calculation    Start Time 1338  -RM      Stop Time 1416  -RM      Time Calculation (min) 38 min  -RM      PT Received On 07/22/24  -RM      PT Goal Re-Cert Due Date 07/31/24  -RM         Time Calculation- PT    Total Timed Code Minutes- PT 38 minute(s)  -RM         Timed Charges    99679 - PT Therapeutic Exercise Minutes 15  -RM      37608 - PT Therapeutic Activity Minutes 23  -RM         Total Minutes    Timed Charges Total Minutes 38  -RM       Total Minutes 38  -RM                User Key  (r) = Recorded By, (t) = Taken By, (c) = Cosigned By      Initials Name Provider Type     Eliceo Matos, MITCH Physical Therapist Assistant                  Therapy Charges for Today       Code Description Service Date Service Provider Modifiers Qty    99859283931 HC PT THER PROC EA 15 MIN 7/22/2024 Eliceo Matos, PTA GP 1    98906137690 HC PT THERAPEUTIC ACT EA 15 MIN 7/22/2024 Eliceo Matos, PTA GP 2            PT G-Codes  Outcome Measure Options: AM-PAC 6 Clicks Basic Mobility (PT)  AM-PAC 6 Clicks Score (PT): 14       Eliceo Matos  PTA  7/22/2024

## 2024-07-22 NOTE — PLAN OF CARE
Goal Outcome Evaluation:  Plan of Care Reviewed With: patient        Progress: improving  Outcome Evaluation: VSS, NO ACUTE EVENTS OVERNIGHT.
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
## 2024-07-23 PROCEDURE — 94799 UNLISTED PULMONARY SVC/PX: CPT

## 2024-07-23 PROCEDURE — 94664 DEMO&/EVAL PT USE INHALER: CPT

## 2024-07-23 PROCEDURE — G0378 HOSPITAL OBSERVATION PER HR: HCPCS

## 2024-07-23 PROCEDURE — 96372 THER/PROPH/DIAG INJ SC/IM: CPT

## 2024-07-23 PROCEDURE — 99232 SBSQ HOSP IP/OBS MODERATE 35: CPT | Performed by: FAMILY MEDICINE

## 2024-07-23 PROCEDURE — 25010000002 ENOXAPARIN PER 10 MG: Performed by: INTERNAL MEDICINE

## 2024-07-23 PROCEDURE — 97110 THERAPEUTIC EXERCISES: CPT

## 2024-07-23 PROCEDURE — 97530 THERAPEUTIC ACTIVITIES: CPT

## 2024-07-23 PROCEDURE — 96376 TX/PRO/DX INJ SAME DRUG ADON: CPT

## 2024-07-23 PROCEDURE — 94761 N-INVAS EAR/PLS OXIMETRY MLT: CPT

## 2024-07-23 PROCEDURE — 25010000002 HYDRALAZINE PER 20 MG: Performed by: INTERNAL MEDICINE

## 2024-07-23 RX ORDER — HYDROCODONE BITARTRATE AND ACETAMINOPHEN 7.5; 325 MG/1; MG/1
1 TABLET ORAL EVERY 8 HOURS PRN
Status: ON HOLD | COMMUNITY
End: 2024-07-26

## 2024-07-23 RX ORDER — NICOTINE 21 MG/24HR
1 PATCH, TRANSDERMAL 24 HOURS TRANSDERMAL
Status: DISCONTINUED | OUTPATIENT
Start: 2024-07-23 | End: 2024-07-26 | Stop reason: HOSPADM

## 2024-07-23 RX ORDER — IPRATROPIUM BROMIDE AND ALBUTEROL SULFATE 2.5; .5 MG/3ML; MG/3ML
6 SOLUTION RESPIRATORY (INHALATION) 2 TIMES DAILY
COMMUNITY

## 2024-07-23 RX ORDER — UMECLIDINIUM BROMIDE AND VILANTEROL TRIFENATATE 62.5; 25 UG/1; UG/1
1 POWDER RESPIRATORY (INHALATION)
COMMUNITY

## 2024-07-23 RX ADMIN — DANTROLENE SODIUM 25 MG: 25 CAPSULE ORAL at 08:19

## 2024-07-23 RX ADMIN — PANTOPRAZOLE SODIUM 40 MG: 40 TABLET, DELAYED RELEASE ORAL at 08:19

## 2024-07-23 RX ADMIN — NICOTINE 1 PATCH: 21 PATCH TRANSDERMAL at 19:28

## 2024-07-23 RX ADMIN — CITALOPRAM HYDROBROMIDE 10 MG: 20 TABLET ORAL at 08:19

## 2024-07-23 RX ADMIN — HYDRALAZINE HYDROCHLORIDE 10 MG: 20 INJECTION INTRAMUSCULAR; INTRAVENOUS at 12:02

## 2024-07-23 RX ADMIN — IPRATROPIUM BROMIDE AND ALBUTEROL SULFATE 3 ML: .5; 3 SOLUTION RESPIRATORY (INHALATION) at 00:07

## 2024-07-23 RX ADMIN — DIVALPROEX SODIUM 500 MG: 500 TABLET, DELAYED RELEASE ORAL at 22:00

## 2024-07-23 RX ADMIN — ASPIRIN 81 MG CHEWABLE TABLET 81 MG: 81 TABLET CHEWABLE at 08:19

## 2024-07-23 RX ADMIN — Medication 10 ML: at 21:57

## 2024-07-23 RX ADMIN — SENNOSIDES AND DOCUSATE SODIUM 2 TABLET: 50; 8.6 TABLET ORAL at 21:57

## 2024-07-23 RX ADMIN — LEVETIRACETAM 500 MG: 500 TABLET, FILM COATED ORAL at 11:57

## 2024-07-23 RX ADMIN — IPRATROPIUM BROMIDE AND ALBUTEROL SULFATE 3 ML: .5; 3 SOLUTION RESPIRATORY (INHALATION) at 12:58

## 2024-07-23 RX ADMIN — DANTROLENE SODIUM 25 MG: 25 CAPSULE ORAL at 21:57

## 2024-07-23 RX ADMIN — PREGABALIN 300 MG: 75 CAPSULE ORAL at 11:57

## 2024-07-23 RX ADMIN — BUDESONIDE 0.5 MG: 0.5 INHALANT RESPIRATORY (INHALATION) at 06:55

## 2024-07-23 RX ADMIN — SENNOSIDES AND DOCUSATE SODIUM 2 TABLET: 50; 8.6 TABLET ORAL at 08:19

## 2024-07-23 RX ADMIN — LEVETIRACETAM 500 MG: 500 TABLET, FILM COATED ORAL at 22:00

## 2024-07-23 RX ADMIN — HYDROCODONE BITARTRATE AND ACETAMINOPHEN 1 TABLET: 7.5; 325 TABLET ORAL at 19:29

## 2024-07-23 RX ADMIN — Medication 10 ML: at 08:19

## 2024-07-23 RX ADMIN — METOPROLOL SUCCINATE 50 MG: 25 TABLET, EXTENDED RELEASE ORAL at 08:19

## 2024-07-23 RX ADMIN — FAMOTIDINE 20 MG: 20 TABLET, FILM COATED ORAL at 08:19

## 2024-07-23 RX ADMIN — DIVALPROEX SODIUM 500 MG: 500 TABLET, DELAYED RELEASE ORAL at 11:57

## 2024-07-23 RX ADMIN — LOSARTAN POTASSIUM 50 MG: 50 TABLET, FILM COATED ORAL at 08:19

## 2024-07-23 RX ADMIN — ENOXAPARIN SODIUM 40 MG: 100 INJECTION SUBCUTANEOUS at 06:10

## 2024-07-23 RX ADMIN — IPRATROPIUM BROMIDE AND ALBUTEROL SULFATE 3 ML: .5; 3 SOLUTION RESPIRATORY (INHALATION) at 06:54

## 2024-07-23 RX ADMIN — HYDROCODONE BITARTRATE AND ACETAMINOPHEN 1 TABLET: 7.5; 325 TABLET ORAL at 12:15

## 2024-07-23 RX ADMIN — PREGABALIN 300 MG: 75 CAPSULE ORAL at 22:00
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
## 2024-07-23 NOTE — THERAPY TREATMENT NOTE
Patient Name: Roxi Valerio  : 1975    MRN: [id number]                              Today's Date: 2024       Admit Date: 2024    Visit Dx:     ICD-10-CM ICD-9-CM   1. Unable to care for self  Z78.9 V49.89     Patient Active Problem List   Diagnosis    Influenza A    Unable to care for self    Hypertensive urgency     Past Medical History:   Diagnosis Date    COPD (chronic obstructive pulmonary disease)     Hypertension     Paralysis due to old stroke     right sided    Stroke     TBI (traumatic brain injury)      Past Surgical History:   Procedure Laterality Date    BRAIN SURGERY      TUBAL ABDOMINAL LIGATION        General Information       Row Name 24 1554          Physical Therapy Time and Intention    Document Type therapy note (daily note)  -RM     Mode of Treatment physical therapy  -RM       Row Name 24 8749          General Information    Patient Profile Reviewed yes  -RM     Existing Precautions/Restrictions fall;seizures  -RM       Row Name 24 7072          Cognition    Orientation Status (Cognition) oriented to;person;unable/difficult to assess  -RM       Row Name 24 3126          Safety Issues, Functional Mobility    Safety Issues Affecting Function (Mobility) friction/shear risk;safety precaution awareness;safety precautions follow-through/compliance  -RM     Impairments Affecting Function (Mobility) balance;cognition;grasp;muscle tone abnormal;motor planning;range of motion (ROM);strength;endurance/activity tolerance;coordination;motor control;pain  -RM               User Key  (r) = Recorded By, (t) = Taken By, (c) = Cosigned By      Initials Name Provider Type    RM Eliceo Matos, PTA Physical Therapist Assistant                   Mobility       Row Name 24 1601          Bed Mobility    Supine-Sit [place] (Bed Mobility) minimum assist (75% patient effort);verbal cues;nonverbal cues (demo/gesture);contact guard  -RM     Assistive Device (Bed  Mobility) head of bed elevated;bed rails  -RM       Row Name 07/23/24 1601          Bed-Chair Transfer    Bed-Chair [place] (Transfers) contact guard;minimum assist (75% patient effort);verbal cues;nonverbal cues (demo/gesture)  -RM     Assistive Device (Bed-Chair Transfers) walker, lourdes  -RM       Row Name 07/23/24 1601          Sit-Stand Transfer    Sit-Stand [place] (Transfers) minimum assist (75% patient effort);nonverbal cues (demo/gesture);verbal cues  -RM     Assistive Device (Sit-Stand Transfers) walker, lourdes  -RM               User Key  (r) = Recorded By, (t) = Taken By, (c) = Cosigned By      Initials Name Provider Type    Eliceo López, MITCH Physical Therapist Assistant                   Obj/Interventions       Row Name 07/23/24 1602          Motor Skills    Motor Skills --  AROM with L LE and AAROM/PROM R LE x10-15 reps.  -RM               User Key  (r) = Recorded By, (t) = Taken By, (c) = Cosigned By      Initials Name Provider Type    Eliceo López, MITCH Physical Therapist Assistant                   Goals/Plan    No documentation.                  Clinical Impression       Row Name 07/23/24 1604          Pain Scale: FACES Pre/Post-Treatment    Pain: FACES Scale, Pretreatment 8-->hurts whole lot  -RM     Posttreatment Pain Rating 8-->hurts whole lot  -RM     Pain Location - head  -RM       Row Name 07/23/24 1604          Plan of Care Review    Plan of Care Reviewed With patient  -RM     Progress improving  -RM       Row Name 07/23/24 1604          Vital Signs    O2 Delivery Pre Treatment room air  -RM     O2 Delivery Intra Treatment room air  -RM     O2 Delivery Post Treatment room air  -RM       Row Name 07/23/24 1604          Positioning and Restraints    Pre-Treatment Position in bed  -RM     Post Treatment Position chair  -RM     In Chair reclined;call light within reach;encouraged to call for assist;exit alarm on;notified nsg  -RM               User Key  (r) = Recorded By,  (t) = Taken By, (c) = Cosigned By      Initials Name Provider Type    Eliceo López, PTA Physical Therapist Assistant                   Outcome Measures       Row Name 07/23/24 1605          How much help from another person do you currently need...    Turning from your back to your side while in flat bed without using bedrails? 4  -RM     Moving from lying on back to sitting on the side of a flat bed without bedrails? 4  -RM     Moving to and from a bed to a chair (including a wheelchair)? 3  -RM     Standing up from a chair using your arms (e.g., wheelchair, bedside chair)? 3  -RM     Climbing 3-5 steps with a railing? 1  -RM     To walk in hospital room? 1  -RM     AM-PAC 6 Clicks Score (PT) 16  -RM     Highest Level of Mobility Goal 5 --> Static standing  -RM       Row Name 07/23/24 1605          Functional Assessment    Outcome Measure Options AM-PAC 6 Clicks Basic Mobility (PT)  -RM               User Key  (r) = Recorded By, (t) = Taken By, (c) = Cosigned By      Initials Name Provider Type    Eliceo López, PTA Physical Therapist Assistant                                 Physical Therapy Education       Title: PT OT SLP Therapies (In Progress)       Topic: Physical Therapy (In Progress)       Point: Mobility training (Done)       Learning Progress Summary             Patient Acceptance, E,TB,D, VU,NR by  at 7/22/2024 1536    Acceptance, E, VU by  at 7/21/2024 1103    Comment: pt education on purpose of PT evaluation and her inpt PT POC                         Point: Home exercise program (Done)       Learning Progress Summary             Patient Acceptance, E,TB,D, VU,NR by  at 7/23/2024 1605    Acceptance, E,TB,D, VU,NR by  at 7/22/2024 1536                         Point: Body mechanics (Not Started)       Learner Progress:  Not documented in this visit.              Point: Precautions (Not Started)       Learner Progress:  Not documented in this visit.                              User  Key       Initials Effective Dates Name Provider Type Discipline    RM 06/16/21 -  Eliceo Matos PTA Physical Therapist Assistant PT    TW 06/16/21 -  Cherelle Herrera PT Physical Therapist PT                  PT Recommendation and Plan     Plan of Care Reviewed With: patient  Progress: improving  Outcome Evaluation: Pt  supine in bed and willing to participate with treatment. Pt performed bed mobility with min a. Pt with use of lourdes walker performed STS with min a and pt performed pivot transfer from bed to chair with min a with lourdes walker. Pt performed exercises per flowsheet. Cont PT per POC progressing to goals as pt tolerates.     Time Calculation:         PT Charges       Row Name 07/23/24 1606             Time Calculation    Start Time 1457  -RM      Stop Time 1523  -RM      Time Calculation (min) 26 min  -RM      PT Received On 07/23/24  -RM      PT Goal Re-Cert Due Date 07/31/24  -RM         Time Calculation- PT    Total Timed Code Minutes- PT 26 minute(s)  -RM         Timed Charges    31439 - PT Therapeutic Exercise Minutes 16  -RM      44916 - PT Therapeutic Activity Minutes 10  -RM         Total Minutes    Timed Charges Total Minutes 26  -RM       Total Minutes 26  -RM                User Key  (r) = Recorded By, (t) = Taken By, (c) = Cosigned By      Initials Name Provider Type     Eliceo Matos, PTA Physical Therapist Assistant                  Therapy Charges for Today       Code Description Service Date Service Provider Modifiers Qty    68883343905 HC PT THER PROC EA 15 MIN 7/22/2024 Eliceo Matos, PTA GP 1    79064049296 HC PT THERAPEUTIC ACT EA 15 MIN 7/22/2024 Eliceo Matos, PTA GP 2    25434192340 HC PT THER PROC EA 15 MIN 7/23/2024 Eliceo Matos, PTA GP 1    81907723843 HC PT THERAPEUTIC ACT EA 15 MIN 7/23/2024 Eliceo Matos, PTA GP 1            PT G-Codes  Outcome Measure Options: AM-PAC 6 Clicks Basic Mobility (PT)  AM-PAC 6 Clicks Score (PT): 16  AM-PAC 6 Clicks Score  (OT): 13       Eliceo Matos, PTA  7/23/2024

## 2024-07-23 NOTE — PLAN OF CARE
Goal Outcome Evaluation:  Plan of Care Reviewed With: patient        Progress: improving  Outcome Evaluation: Pt seen for therapy today.  Pt sat eob with min assist, able to don her socks with set up of necessary items.  Pt stood with min assist and transferred from bed to chair min assist using lourdes walker.  Pt able to perform LUE strengthening ex using red theraband for resistance.  Pt progressing with therapy.  Cont OT per POC.

## 2024-07-23 NOTE — THERAPY TREATMENT NOTE
Patient Name: Roxi Valerio  : 1975    MRN: [id number]                              Today's Date: 2024       Admit Date: 2024    Visit Dx:     ICD-10-CM ICD-9-CM   1. Unable to care for self  Z78.9 V49.89     Patient Active Problem List   Diagnosis    Influenza A    Unable to care for self    Hypertensive urgency     Past Medical History:   Diagnosis Date    COPD (chronic obstructive pulmonary disease)     Hypertension     Paralysis due to old stroke     right sided    Stroke     TBI (traumatic brain injury)      Past Surgical History:   Procedure Laterality Date    BRAIN SURGERY      TUBAL ABDOMINAL LIGATION        General Information       Row Name 24 1616          OT Time and Intention    Document Type therapy note (daily note)  -     Mode of Treatment occupational therapy  -       Row Name 24 161          General Information    Patient Profile Reviewed yes  -     Existing Precautions/Restrictions fall;seizures  -     Barriers to Rehab medically complex;previous functional deficit;cognitive status;language barrier  -               User Key  (r) = Recorded By, (t) = Taken By, (c) = Cosigned By      Initials Name Provider Type     Osiris Donnelly Occupational Therapist                     Mobility/ADL's       Row Name 24          Bed Mobility    Bed Mobility supine-sit  -     Supine-Sit [place] (Bed Mobility) minimum assist (75% patient effort);verbal cues;nonverbal cues (demo/gesture);contact guard  -     Assistive Device (Bed Mobility) head of bed elevated;bed rails  -       Row Name 24          Transfers    Transfers sit-stand transfer  -       Row Name 24          Bed-Chair Transfer    Bed-Chair [place] (Transfers) contact guard;minimum assist (75% patient effort);verbal cues;nonverbal cues (demo/gesture)  -     Assistive Device (Bed-Chair Transfers) walker, lourdes  -       Row Name 24          Sit-Stand  Transfer    Sit-Stand [place] (Transfers) minimum assist (75% patient effort);nonverbal cues (demo/gesture);verbal cues  -     Assistive Device (Sit-Stand Transfers) walker, lourdes  -AH       Row Name 07/23/24 1617          Lower Body Dressing Assessment/Training    [place] Level (Lower Body Dressing) don;socks;set up;standby assist  -               User Key  (r) = Recorded By, (t) = Taken By, (c) = Cosigned By      Initials Name Provider Type     Osiris Donnelly Occupational Therapist                   Obj/Interventions       Row Name 07/23/24 1618          Shoulder (Therapeutic Exercise)    Shoulder (Therapeutic Exercise) strengthening exercise  -     Shoulder Strengthening (Therapeutic Exercise) left;flexion;extension;horizontal aBduction/aDduction;resistance band;red;15 repititions  -AH       Row Name 07/23/24 1618          Elbow/Forearm (Therapeutic Exercise)    Elbow/Forearm (Therapeutic Exercise) strengthening exercise  -     Elbow/Forearm Strengthening (Therapeutic Exercise) left;flexion;extension;resistance band;red;15 repititions  -AH       Row Name 07/23/24 1618          Motor Skills    Therapeutic Exercise shoulder;elbow/forearm  -               User Key  (r) = Recorded By, (t) = Taken By, (c) = Cosigned By      Initials Name Provider Type     Osiris Donnelly Occupational Therapist                   Goals/Plan    No documentation.                  Clinical Impression       Row Name 07/23/24 1619          Pain Assessment    Pain Intervention(s) Repositioned;Ambulation/increased activity  -AH       Row Name 07/23/24 1619          Pain Scale: FACES Pre/Post-Treatment    Pain: FACES Scale, Pretreatment 8-->hurts whole lot  -     Posttreatment Pain Rating 8-->hurts whole lot  -     Pain Location - head  -AH       Row Name 07/23/24 1619          Plan of Care Review    Plan of Care Reviewed With patient  -     Progress improving  -     Outcome Evaluation Pt seen for therapy today.   Pt sat eob with min assist, able to don her socks with set up of necessary items.  Pt stood with min assist and transferred from bed to chair min assist using lourdes walker.  Pt able to perform LUE strengthening ex using red theraband for resistance.  Pt progressing with therapy.  Cont OT per POC.  -       Row Name 07/23/24 1619          Positioning and Restraints    Pre-Treatment Position in bed  -     Post Treatment Position chair  -AH     In Chair reclined;call light within reach;encouraged to call for assist;exit alarm on  -               User Key  (r) = Recorded By, (t) = Taken By, (c) = Cosigned By      Initials Name Provider Type     Osiris Donnelly Occupational Therapist                   Outcome Measures       Row Name 07/23/24 1626          How much help from another is currently needed...    Putting on and taking off regular lower body clothing? 3  -AH     Bathing (including washing, rinsing, and drying) 2  -AH     Toileting (which includes using toilet bed pan or urinal) 2  -AH     Putting on and taking off regular upper body clothing 2  -AH     Taking care of personal grooming (such as brushing teeth) 2  -AH     Eating meals 3  -AH     AM-PAC 6 Clicks Score (OT) 14  -AH       Row Name 07/23/24 1605          How much help from another person do you currently need...    Turning from your back to your side while in flat bed without using bedrails? 4  -RM     Moving from lying on back to sitting on the side of a flat bed without bedrails? 4  -RM     Moving to and from a bed to a chair (including a wheelchair)? 3  -RM     Standing up from a chair using your arms (e.g., wheelchair, bedside chair)? 3  -RM     Climbing 3-5 steps with a railing? 1  -RM     To walk in hospital room? 1  -RM     AM-PAC 6 Clicks Score (PT) 16  -RM     Highest Level of Mobility Goal 5 --> Static standing  -RM       Row Name 07/23/24 1626 07/23/24 1605       Functional Assessment    Outcome Measure Options AM-PAC 6 Clicks Daily  Activity (OT)  - AM-PAC 6 Clicks Basic Mobility (PT)  -              User Key  (r) = Recorded By, (t) = Taken By, (c) = Cosigned By      Initials Name Provider Type     Osiris Donnelly Occupational Therapist    Eliceo López, PTA Physical Therapist Assistant                    Occupational Therapy Education       Title: PT OT SLP Therapies (In Progress)       Topic: Occupational Therapy (In Progress)       Point: ADL training (Done)       Description:   Instruct learner(s) on proper safety adaptation and remediation techniques during self care or transfers.   Instruct in proper use of assistive devices.                  Learning Progress Summary             Patient Acceptance, E,TB, VU by  at 7/23/2024 1627    Comment: benefit of working with therapy    Acceptance, E,TB, VU,NR by  at 7/22/2024 1623    Comment: Pt educated on ADL retraining. Pt will require further education for follow through.                         Point: Home exercise program (Done)       Description:   Instruct learner(s) on appropriate technique for monitoring, assisting and/or progressing therapeutic exercises/activities.                  Learning Progress Summary             Patient Acceptance, E,TB, VU by  at 7/23/2024 1627    Comment: benefit of working with therapy                         Point: Precautions (Not Started)       Description:   Instruct learner(s) on prescribed precautions during self-care and functional transfers.                  Learner Progress:  Not documented in this visit.              Point: Body mechanics (Not Started)       Description:   Instruct learner(s) on proper positioning and spine alignment during self-care, functional mobility activities and/or exercises.                  Learner Progress:  Not documented in this visit.                              User Key       Initials Effective Dates Name Provider Type [facility] 06/16/21 -  Osiris Donnelly Occupational Therapist OT      07/06/23 -  Michael Schofield OT Occupational Therapist OT                  OT Recommendation and Plan     Plan of Care Review  Plan of Care Reviewed With: patient  Progress: improving  Outcome Evaluation: Pt seen for therapy today.  Pt sat eob with min assist, able to don her socks with set up of necessary items.  Pt stood with min assist and transferred from bed to chair min assist using lourdse walker.  Pt able to perform LUE strengthening ex using red theraband for resistance.  Pt progressing with therapy.  Cont OT per POC.     Time Calculation:         Time Calculation- OT       Row Name 07/23/24 1627             Time Calculation- OT    OT Start Time 1458  -      OT Stop Time 1523  -      OT Time Calculation (min) 25 min  -AH      OT Received On 07/23/24  -      OT Goal Re-Cert Due Date 08/01/24  -         Timed Charges    54652 - OT Therapeutic Exercise Minutes 10  -AH      47098 - OT Therapeutic Activity Minutes 10  -AH      36897 - OT Self Care/Mgmt Minutes 5  -AH         Total Minutes    Timed Charges Total Minutes 25  -AH       Total Minutes 25  -AH                User Key  (r) = Recorded By, (t) = Taken By, (c) = Cosigned By      Initials Name Provider Type     Osiris Donnelly Occupational Therapist                  Therapy Charges for Today       Code Description Service Date Service Provider Modifiers Qty    91333075243  OT THER PROC EA 15 MIN 7/23/2024 Osiris Donnelly    [id number]  OT THERAPEUTIC ACT EA 15 MIN 7/23/2024 Osiris Donnelly  7/23/2024

## 2024-07-23 NOTE — PLAN OF CARE
Goal Outcome Evaluation: No acute changes this shift.

## 2024-07-23 NOTE — CASE MANAGEMENT/SOCIAL WORK
Case Management/Social Work    Patient Name:  Roxi Valerio  YOB: 1975  MRN: [id number]  Admit Date:  7/20/2024      APS at the hospital to assess pt.     09:50 EDT  Sw spoke to Waleska/Mechelle regarding the referral.  States she did  not get the referral.  Aram resent the referral and asked for Waleska to call if she does not receive the referral.  Also, updated Waleska that [facility] was the first choice for STR.        Electronically signed by:  KIERRA Broussard  07/23/24 09:23 EDT

## 2024-07-23 NOTE — PROGRESS NOTES
[facility]    Name:  Roxi Valerio   Age:  49 y.o.  Sex:  female  :  1975  MRN:  [id number]   Visit Number:  [id number]  Admission Date:  2024  Date Of Service:  24  Primary Care Physician:  System, Provider Not In     LOS: 0 days :    Chief Complaint:      Weakness, failure to thrive    Subjective:    Patient awake and arousable, repeatedly said yes to all my questions.  Unsure her baseline, per chart review appears likely her baseline.    Hospital Course:    49-year-old with history of prior TBI, large left ICH, right hemiparesis, aphasia, hydrocephalus with a right frontal VPS, hypertension, COPD, who presented to the emergency room with weakness, inability to provide her care at home.    Workup in the emergency room the patient was hypertensive.  She had possible UTI.  CT scan of the head was unremarkable.  She was admitted to the hospital service.    Review of Systems:     All systems were reviewed and negative except as mentioned in subjective, assessment and plan.    Vital Signs:    Temp:  [97.3 °F (36.3 °C)-98 °F (36.7 °C)] 98 °F (36.7 °C)  Heart Rate:  [76-94] 94  Resp:  [16-21] 18  BP: (156-177)/() 164/94    Intake and output:    I/O last 3 completed shifts:  In: 590 [P.O.:590]  Out: -   I/O this shift:  In: 360 [P.O.:360]  Out: -     Physical Examination:    General Appearance:  Alert and cooperative.  No acute distress   Head:  Atraumatic and normocephalic.  Scar noted   Eyes: Conjunctivae and sclerae normal, no icterus. No pallor.   Throat: No oral lesions, no thrush, oral mucosa moist.   Neck: Supple, trachea midline, no thyromegaly.   Lungs:   Breath sounds heard bilaterally equally.  No wheezing or crackles. No Pleural rub or bronchial breathing.   Heart:  Normal S1 and S2, no murmur, no gallop, no rub. No JVD.   Abdomen:   Normal bowel sounds, no masses, no organomegaly. Soft, nontender, nondistended, no rebound tenderness.    Extremities: Supple, no edema, no cyanosis, no clubbing.   Skin: No bleeding or rash.   Neurologic: Alert and arousable.  Repeatedly answers yes to all questions.  Hemiplegia noted on the right.     Laboratory results:    Results from last 7 days   Lab Units 07/22/24  1714 07/22/24  0629 07/21/24  2320 07/21/24  0643 07/20/24  2304   SODIUM mmol/L  --  146*  --  145 144   POTASSIUM mmol/L 4.0 3.6 3.8 2.6* 4.6   CHLORIDE mmol/L  --  109*  --  109* 108*   CO2 mmol/L  --  19.9*  --  23.1 23.2   BUN mg/dL  --  14  --  11 12   CREATININE mg/dL  --  0.64  --  0.67 0.72   CALCIUM mg/dL  --  9.1  --  9.0 9.0   BILIRUBIN mg/dL  --   --   --   --  0.3   ALK PHOS U/L  --   --   --   --  103   ALT (SGPT) U/L  --   --   --   --  14   AST (SGOT) U/L  --   --   --   --  17   GLUCOSE mg/dL  --  93  --  89 91     Results from last 7 days   Lab Units 07/22/24  0629 07/21/24  0643 07/20/24  2304   WBC 10*3/mm3 6.09 6.97 7.75   HEMOGLOBIN g/dL 13.9 14.0 14.3   HEMATOCRIT % 42.3 41.9 42.2   PLATELETS 10*3/mm3 225 227 219                 Recent Labs     10/29/23  2343   PHART 7.400   UKV9JFN 34.1*   PO2ART 70.4*   RMG9YQB 21.1*   BASEEXCESS -2.9*      I have reviewed the patient's laboratory results.    Radiology results:    No radiology results from the last 24 hrs  I have reviewed the patient's radiology reports.    Medication Review:     I have reviewed the patient's active and prn medications.     Problem List:      Hypertensive urgency    Unable to care for self      Assessment:    Hypertensive urgency, POA.  Inability to care for self.  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  Essential hypertension.  COPD.  Obesity with a BMI of 39.    Plan:    Hypertensive urgency.  Continue to make adjustments, currently on losartan, Toprol-XL.     Traumatic brain injury and inability to care for self.  PT and OT.  Looking into rehab options at this time.  May end up needing long-term care     COPD.  Continue with  bronchodilators    DVT Prophylaxis: Enoxaparin  Code Status: Full  Diet: Cardiac  Discharge Plan: Appropriate for discharge to rehab    Katty Garcia DO  07/23/24  11:41 EDT    Dictated utilizing Dragon dictation.     Discussed results and outcome of testing with the patient, given copy as well.  Pt given Rx and instructed/cautioned on use. Patient advised to please follow up with their primary care doctor within the next 24 hours and return to the Emergency Department for worsening symptoms or any other concerns.  Patient advised that their doctor may call  to follow up on the specific results of the tests performed today in the emergency department.

## 2024-07-24 PROCEDURE — G0378 HOSPITAL OBSERVATION PER HR: HCPCS

## 2024-07-24 PROCEDURE — 25010000002 ENOXAPARIN PER 10 MG: Performed by: INTERNAL MEDICINE

## 2024-07-24 PROCEDURE — 99232 SBSQ HOSP IP/OBS MODERATE 35: CPT | Performed by: FAMILY MEDICINE

## 2024-07-24 PROCEDURE — 96372 THER/PROPH/DIAG INJ SC/IM: CPT

## 2024-07-24 PROCEDURE — 97530 THERAPEUTIC ACTIVITIES: CPT

## 2024-07-24 PROCEDURE — 94799 UNLISTED PULMONARY SVC/PX: CPT

## 2024-07-24 PROCEDURE — 94761 N-INVAS EAR/PLS OXIMETRY MLT: CPT

## 2024-07-24 PROCEDURE — 94664 DEMO&/EVAL PT USE INHALER: CPT

## 2024-07-24 RX ADMIN — NICOTINE 1 PATCH: 21 PATCH TRANSDERMAL at 08:59

## 2024-07-24 RX ADMIN — PREGABALIN 300 MG: 75 CAPSULE ORAL at 21:57

## 2024-07-24 RX ADMIN — HYDROCODONE BITARTRATE AND ACETAMINOPHEN 1 TABLET: 7.5; 325 TABLET ORAL at 05:27

## 2024-07-24 RX ADMIN — ASPIRIN 81 MG CHEWABLE TABLET 81 MG: 81 TABLET CHEWABLE at 08:58

## 2024-07-24 RX ADMIN — IPRATROPIUM BROMIDE AND ALBUTEROL SULFATE 3 ML: .5; 3 SOLUTION RESPIRATORY (INHALATION) at 19:26

## 2024-07-24 RX ADMIN — BUDESONIDE 0.5 MG: 0.5 INHALANT RESPIRATORY (INHALATION) at 06:57

## 2024-07-24 RX ADMIN — IPRATROPIUM BROMIDE AND ALBUTEROL SULFATE 3 ML: .5; 3 SOLUTION RESPIRATORY (INHALATION) at 13:01

## 2024-07-24 RX ADMIN — FAMOTIDINE 20 MG: 20 TABLET, FILM COATED ORAL at 08:58

## 2024-07-24 RX ADMIN — DANTROLENE SODIUM 25 MG: 25 CAPSULE ORAL at 08:58

## 2024-07-24 RX ADMIN — PANTOPRAZOLE SODIUM 40 MG: 40 TABLET, DELAYED RELEASE ORAL at 08:58

## 2024-07-24 RX ADMIN — ENOXAPARIN SODIUM 40 MG: 100 INJECTION SUBCUTANEOUS at 05:27

## 2024-07-24 RX ADMIN — IPRATROPIUM BROMIDE AND ALBUTEROL SULFATE 3 ML: .5; 3 SOLUTION RESPIRATORY (INHALATION) at 06:57

## 2024-07-24 RX ADMIN — PREGABALIN 300 MG: 75 CAPSULE ORAL at 10:25

## 2024-07-24 RX ADMIN — HYDROCODONE BITARTRATE AND ACETAMINOPHEN 1 TABLET: 7.5; 325 TABLET ORAL at 15:13

## 2024-07-24 RX ADMIN — Medication 10 ML: at 21:59

## 2024-07-24 RX ADMIN — Medication 10 ML: at 08:58

## 2024-07-24 RX ADMIN — LOSARTAN POTASSIUM 50 MG: 50 TABLET, FILM COATED ORAL at 08:58

## 2024-07-24 RX ADMIN — LEVETIRACETAM 500 MG: 500 TABLET, FILM COATED ORAL at 21:58

## 2024-07-24 RX ADMIN — CITALOPRAM HYDROBROMIDE 10 MG: 20 TABLET ORAL at 08:58

## 2024-07-24 RX ADMIN — DIVALPROEX SODIUM 500 MG: 500 TABLET, DELAYED RELEASE ORAL at 21:57

## 2024-07-24 RX ADMIN — LEVETIRACETAM 500 MG: 500 TABLET, FILM COATED ORAL at 10:25

## 2024-07-24 RX ADMIN — METOPROLOL SUCCINATE 75 MG: 25 TABLET, EXTENDED RELEASE ORAL at 08:58

## 2024-07-24 RX ADMIN — BUDESONIDE 0.5 MG: 0.5 INHALANT RESPIRATORY (INHALATION) at 19:26

## 2024-07-24 RX ADMIN — DANTROLENE SODIUM 25 MG: 25 CAPSULE ORAL at 21:56

## 2024-07-24 RX ADMIN — IPRATROPIUM BROMIDE AND ALBUTEROL SULFATE 3 ML: .5; 3 SOLUTION RESPIRATORY (INHALATION) at 00:25

## 2024-07-24 RX ADMIN — DIVALPROEX SODIUM 500 MG: 500 TABLET, DELAYED RELEASE ORAL at 10:25

## 2024-07-24 NOTE — CASE MANAGEMENT/SOCIAL WORK
0925: CM at bedside. Pt with eyes closed and no distress noted. Speech to be consulted to assist with communications. CM to follow.

## 2024-07-24 NOTE — THERAPY TREATMENT NOTE
Patient Name: Roxi Valerio  : 1975    MRN: [id number]                              Today's Date: 2024       Admit Date: 2024    Visit Dx:     ICD-10-CM ICD-9-CM   1. Unable to care for self  Z78.9 V49.89     Patient Active Problem List   Diagnosis    Influenza A    Unable to care for self    Hypertensive urgency     Past Medical History:   Diagnosis Date    COPD (chronic obstructive pulmonary disease)     Hypertension     Paralysis due to old stroke     right sided    Stroke     TBI (traumatic brain injury)      Past Surgical History:   Procedure Laterality Date    BRAIN SURGERY      TUBAL ABDOMINAL LIGATION        General Information       Row Name 24 1649          Physical Therapy Time and Intention    Document Type therapy note (daily note)  -RM     Mode of Treatment physical therapy  -RM       Row Name 24          General Information    Patient Profile Reviewed yes  -RM     Existing Precautions/Restrictions fall;seizures  -RM               User Key  (r) = Recorded By, (t) = Taken By, (c) = Cosigned By      Initials Name Provider Type    RM Eliceo Matos, PTA Physical Therapist Assistant                   Mobility    No documentation.                  Obj/Interventions    No documentation.                  Goals/Plan    No documentation.                  Clinical Impression       Row Name 24 7052          Pain Scale: FACES Pre/Post-Treatment    Pain: FACES Scale, Pretreatment 8-->hurts whole lot  -RM     Posttreatment Pain Rating 8-->hurts whole lot  -RM     Pain Location - head  -RM       Row Name 24 3080          Plan of Care Review    Plan of Care Reviewed With patient  -RM     Progress no change  -RM     Outcome Evaluation Pt supine in bed and willing to participate with treatment. Pt expressed not feeling well this date.  Pt performed transfer from supine to EOB  with cga. Pt is able to sit EOB without assistance. Pt transfers from bed to chair with pivot  transfer with use of gait belt and lourdes walker min a.  See flowsheet for details. Cont PT per POC proressing to goals as pt tolerates.  -RM       Row Name 07/24/24 1649          Positioning and Restraints    Pre-Treatment Position in bed  -RM     Post Treatment Position chair  -RM     In Chair reclined;call light within reach;encouraged to call for assist;notified nsg  -RM               User Key  (r) = Recorded By, (t) = Taken By, (c) = Cosigned By      Initials Name Provider Type    Eliceo López, MITCH Physical Therapist Assistant                   Outcome Measures       Row Name 07/24/24 1652 07/24/24 0800       How much help from another person do you currently need...    Turning from your back to your side while in flat bed without using bedrails? 4  -RM 4  -MR    Moving from lying on back to sitting on the side of a flat bed without bedrails? 4  -RM 4  -MR    Moving to and from a bed to a chair (including a wheelchair)? 3  -RM 3  -MR    Standing up from a chair using your arms (e.g., wheelchair, bedside chair)? 3  -RM 3  -MR    Climbing 3-5 steps with a railing? 1  -RM 1  -MR    To walk in hospital room? 1  -RM 1  -MR    AM-PAC 6 Clicks Score (PT) 16  -RM 16  -MR    Highest Level of Mobility Goal 5 --> Static standing  -RM 5 --> Static standing  -MR              User Key  (r) = Recorded By, (t) = Taken By, (c) = Cosigned By      Initials Name Provider Type    Eliceo López, PTA Physical Therapist Assistant    Ceh Loja, RN Registered Nurse                                 Physical Therapy Education       Title: PT OT SLP Therapies (In Progress)       Topic: Physical Therapy (In Progress)       Point: Mobility training (Done)       Learning Progress Summary             Patient Acceptance, E,TB,D, VU,NR by  at 7/24/2024 1652    Acceptance, E,TB,D, VU,NR by  at 7/22/2024 1536    Acceptance, E, VU by  at 7/21/2024 1103    Comment: pt education on purpose of PT evaluation and her inpt PT POC                          Point: Home exercise program (Done)       Learning Progress Summary             Patient Acceptance, E,TB,D, VU,NR by  at 7/23/2024 1605    Acceptance, E,TB,D, VU,NR by  at 7/22/2024 1536                         Point: Body mechanics (Not Started)       Learner Progress:  Not documented in this visit.              Point: Precautions (Not Started)       Learner Progress:  Not documented in this visit.                              User Key       Initials Effective Dates Name Provider Type Discipline     06/16/21 -  Eliceo Matos PTA Physical Therapist Assistant PT     06/16/21 -  Cherelle Herrera PT Physical Therapist PT                  PT Recommendation and Plan     Plan of Care Reviewed With: patient  Progress: no change  Outcome Evaluation: Pt supine in bed and willing to participate with treatment. Pt expressed not feeling well this date.  Pt performed transfer from supine to EOB  with cga. Pt is able to sit EOB without assistance. Pt transfers from bed to chair with pivot transfer with use of gait belt and lourdes walker min a.  See flowsheet for details. Cont PT per POC proressing to goals as pt tolerates.     Time Calculation:         PT Charges       Row Name 07/24/24 1653             Time Calculation    Start Time 1546  -RM      Stop Time 1600  -RM      Time Calculation (min) 14 min  -RM      PT Received On 07/24/24  -RM      PT Goal Re-Cert Due Date 07/31/24  -RM         Time Calculation- PT    Total Timed Code Minutes- PT 14 minute(s)  -RM         Timed Charges    98022 - PT Therapeutic Activity Minutes 14  -RM         Total Minutes    Timed Charges Total Minutes 14  -RM       Total Minutes 14  -RM                User Key  (r) = Recorded By, (t) = Taken By, (c) = Cosigned By      Initials Name Provider Type     Eliceo Matos PTA Physical Therapist Assistant                  Therapy Charges for Today       Code Description Service Date Service Provider Modifiers Qty     43133132082 HC PT THER PROC EA 15 MIN 7/23/2024 Eliceo Matos, PTA GP 1    70318445849 HC PT THERAPEUTIC ACT EA 15 MIN 7/23/2024 Eliceo Matos, PTA GP 1    51115967745 HC PT THERAPEUTIC ACT EA 15 MIN 7/24/2024 Eliceo Matos, PTA GP 1            PT G-Codes  Outcome Measure Options: AM-PAC 6 Clicks Daily Activity (OT)  AM-PAC 6 Clicks Score (PT): 16  AM-PAC 6 Clicks Score (OT): 14       Eliceo Matos PTA  7/24/2024

## 2024-07-24 NOTE — PLAN OF CARE
Goal Outcome Evaluation:  Plan of Care Reviewed With: patient        Progress: no change  Outcome Evaluation: Pt supine in bed and willing to participate with treatment. Pt expressed not feeling well this date.  Pt performed transfer from supine to EOB  with cga. Pt is able to sit EOB without assistance. Pt transfers from bed to chair with pivot transfer with use of gait belt and lourdes walker min a.  See flowsheet for details. Cont PT per POC proressing to goals as pt tolerates.

## 2024-07-24 NOTE — PROGRESS NOTES
[facility]    Name:  Roxi Valerio   Age:  49 y.o.  Sex:  female  :  1975  MRN:  [id number]   Visit Number:  [id number]  Admission Date:  2024  Date Of Service:  24  Primary Care Physician:  System, Provider Not In     LOS: 0 days :    Chief Complaint:      Weakness, failure to thrive    Subjective:    Patient awake and alert and at baseline.  Answered yes and no to questions.  Denied any acute issues.  Appetite was okay.  Discussed awaiting placement.    Hospital Course:    49-year-old with history of prior TBI, large left ICH, right hemiparesis, aphasia, hydrocephalus with a right frontal VPS, hypertension, COPD, who presented to the emergency room with weakness, inability to provide her care at home.    Workup in the emergency room the patient was hypertensive.  She had possible UTI.  CT scan of the head was unremarkable.  She was admitted to the hospital service.    Review of Systems:     All systems were reviewed and negative except as mentioned in subjective, assessment and plan.    Vital Signs:    Temp:  [97.4 °F (36.3 °C)-98 °F (36.7 °C)] 98 °F (36.7 °C)  Heart Rate:  [81-95] 83  Resp:  [18-20] 18  BP: (120-184)/() 137/87    Intake and output:    I/O last 3 completed shifts:  In: 600 [P.O.:600]  Out: -   I/O this shift:  In: 350 [P.O.:350]  Out: -     Physical Examination:    General Appearance:  Alert and cooperative.  No acute distress   Head:  Atraumatic and normocephalic.  Scar noted   Eyes: Conjunctivae and sclerae normal, no icterus. No pallor.   Throat: No oral lesions, no thrush, oral mucosa moist.   Neck: Supple, trachea midline, no thyromegaly.   Lungs:   Breath sounds heard bilaterally equally.  No wheezing or crackles. No Pleural rub or bronchial breathing.   Heart:  Normal S1 and S2, no murmur, no gallop, no rub. No JVD.   Abdomen:   Normal bowel sounds, no masses, no organomegaly. Soft, nontender, nondistended, no rebound  tenderness.   Extremities: Supple, no edema, no cyanosis, no clubbing.   Skin: No bleeding or rash.   Neurologic: Alert and arousable.  Repeatedly answers yes and no to all questions.  Hemiplegia noted on the right.     Laboratory results:    Results from last 7 days   Lab Units 07/22/24  1714 07/22/24  0629 07/21/24  2320 07/21/24  0643 07/20/24  2304   SODIUM mmol/L  --  146*  --  145 144   POTASSIUM mmol/L 4.0 3.6 3.8 2.6* 4.6   CHLORIDE mmol/L  --  109*  --  109* 108*   CO2 mmol/L  --  19.9*  --  23.1 23.2   BUN mg/dL  --  14  --  11 12   CREATININE mg/dL  --  0.64  --  0.67 0.72   CALCIUM mg/dL  --  9.1  --  9.0 9.0   BILIRUBIN mg/dL  --   --   --   --  0.3   ALK PHOS U/L  --   --   --   --  103   ALT (SGPT) U/L  --   --   --   --  14   AST (SGOT) U/L  --   --   --   --  17   GLUCOSE mg/dL  --  93  --  89 91     Results from last 7 days   Lab Units 07/22/24  0629 07/21/24  0643 07/20/24  2304   WBC 10*3/mm3 6.09 6.97 7.75   HEMOGLOBIN g/dL 13.9 14.0 14.3   HEMATOCRIT % 42.3 41.9 42.2   PLATELETS 10*3/mm3 225 227 219                 Recent Labs     10/29/23  2343   PHART 7.400   HKS5TTM 34.1*   PO2ART 70.4*   AMZ4UIL 21.1*   BASEEXCESS -2.9*      I have reviewed the patient's laboratory results.    Radiology results:    No radiology results from the last 24 hrs  I have reviewed the patient's radiology reports.    Medication Review:     I have reviewed the patient's active and prn medications.     Problem List:      Hypertensive urgency    Unable to care for self      Assessment:    Hypertensive urgency, POA.  Inability to care for self.  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  Essential hypertension.  COPD.  Obesity with a BMI of 39.    Plan:    Hypertensive urgency.  Continue to make adjustments, currently on losartan, Toprol-XL.  Appears stable     Traumatic brain injury and inability to care for self.  PT and OT.  Looking into rehab options at this time.  May end up needing long-term care      COPD.  Continue with bronchodilators    DVT Prophylaxis: Enoxaparin  Code Status: Full  Diet: Cardiac  Discharge Plan: Medically appropriate for discharge to rehab    Katty Garcia DO  07/24/24  14:22 EDT    Dictated utilizing Dragon dictation.

## 2024-07-24 NOTE — CASE MANAGEMENT/SOCIAL WORK
Case Management/Social Work    Patient Name:  Roxi Valerio  YOB: 1975  MRN: [id number]  Admit Date:  7/20/2024      Sw received call from [facility]. States she still has not received the pt referral. Asked for the referral to be hard faxed to [phone].  At this time Sw has hard faxed referral to [facility] and received a confirmation.  Aram will continue to follow for placement.     15:16 EDT  Sw placed call to [facility]. No answer. Sw left a vm requesting a call back.       Electronically signed by:  KIERRA Broussard  07/24/24 10:03 EDT

## 2024-07-24 NOTE — PLAN OF CARE
Goal Outcome Evaluation:   PRN meds given for pain. See EMAR. No acute changes this shift.

## 2024-07-24 NOTE — DISCHARGE PLACEMENT REQUEST
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 complex;previous functional deficit;cognitive status;impaired sensation;contractures  -TW       Row Name 07/21/24 1103          Living Environment    People in Home other (see comments)  Family. Per pt her sister? but this is not clear due to pt's difficulty with verbalizing.  -TW       Row Name 07/21/24 1103          Home Main Entrance    Number of Stairs, Main Entrance none  -TW       Row Name 07/21/24 1103          Stairs Within Home, Primary    Number of Stairs, Within Home, Primary none  -TW       Row Name 07/21/24 1103          Cognition    Orientation Status (Cognition) oriented to;person;unable/difficult to assess  pt expressively aphasic but did say Yea when renzo and date of birth provided.  -TW       Row Name 07/21/24 1103          Safety Issues, Functional Mobility    Safety Issues Affecting Function (Mobility) friction/shear risk;problem-solving;safety precaution awareness;safety precautions follow-through/compliance;sequencing abilities  -TW     Impairments Affecting Function (Mobility)  balance;cognition;grasp;muscle tone abnormal;motor planning;range of motion (ROM);strength;endurance/activity tolerance;coordination;motor control;pain  -TW     Cognitive Impairments, Mobility Safety/Performance safety precaution awareness;safety precaution follow-through;sequencing abilities  -TW               User Key  (r) = Recorded By, (t) = Taken By, (c) = Cosigned By      Initials Name Provider Type    TW Cherelle Herrera, PT Physical Therapist                   Mobility       Row Name 07/21/24 1103          Bed Mobility    Bed Mobility supine-sit  -TW     Supine-Sit [place] (Bed Mobility) moderate assist (50% patient effort);verbal cues;nonverbal cues (demo/gesture)  -TW     Assistive Device (Bed Mobility) head of bed elevated  -TW     Comment, (Bed Mobility) once pt was sitting on EOB she was able to maintain midline sitting x 6 min once RUE placed beside her. Pt was not observed to be impulsive and attempted to assist with all mobility.  -       Row Name 07/21/24 1103          Bed-Chair Transfer    Bed-Chair [place] (Transfers) minimum assist (75% patient effort);nonverbal cues (demo/gesture);verbal cues  -TW     Assistive Device (Bed-Chair Transfers) walker, lourdes  -TW     Comment, (Bed-Chair Transfer) going to the L. Limited wt placed through RLE and limited stepping with RLE.  -       Row Name 07/21/24 1103          Sit-Stand Transfer    Sit-Stand [place] (Transfers) minimum assist (75% patient effort);nonverbal cues (demo/gesture);verbal cues  -TW     Assistive Device (Sit-Stand Transfers) walker, lourdes  -TW     Comment, (Sit-Stand Transfer) pt's wt primarily on the LLE. Pt had extensor tone throughout RLE. R ankle plantar flexed and supinated with wt on lateral aspect of R foot  -TW       Row Name 07/21/24 1103          Gait/Stairs (Locomotion)    [place] Level (Gait) not tested  -TW     Comment, (Gait/Stairs) per pt she hasn't been ambulating  -TW               User Key  (r) =  Recorded By, (t) = Taken By, (c) = Cosigned By      Initials Name Provider Type    TW Cherelle Herrera PT Physical Therapist                   Obj/Interventions       Row Name 07/21/24 1103          Range of Motion Comprehensive    Comment, General Range of Motion Pt has extensor tone pattern in RLE and UE. When supine therapist not able to dorsiflex R ankle at all. When sitting on EOB pt was able to position R ankle in -10 degrees dorsiflexion.  Pt has limited ROM in RUE as well.  -TW       Row Name 07/21/24 1103          Strength Comprehensive (MMT)    Comment, General Manual Muscle Testing (MMT) Assessment No isolated movement seen in RLE. Pt's LLE grossly 3+/5 to 4+/5  -[facility] Name 07/21/24 1103          Motor Skills    Motor Skills muscle tone  -TW     Muscle Tone right;upper extremity(s);lower extremity(s);hypertonia;severe impairment  -TW       [facility] Name 07/21/24 1103          Balance    Balance Assessment sitting static balance;sit to stand dynamic balance;sitting dynamic balance;standing static balance;standing dynamic balance  -TW     Static Sitting Balance standby assist  -TW     Dynamic Sitting Balance standby assist  -TW     Position, Sitting Balance unsupported;sitting edge of bed  -TW     Sit to Stand Dynamic Balance minimal assist;non-verbal cues (demo/gesture);verbal cues  -TW     Static Standing Balance minimal assist;verbal cues;non-verbal cues (demo/gesture)  -TW     Dynamic Standing Balance minimal assist;verbal cues;non-verbal cues (demo/gesture)  -TW     Position/Device Used, Standing Balance walker, lourdes  -TW               User Key  (r) = Recorded By, (t) = Taken By, (c) = Cosigned By      Initials Name Provider Type    TW Cherelle Herrera PT Physical Therapist                   Goals/Plan       Row Name 07/21/24 1103          Bed Mobility Goal 1 (PT)    Activity/Assistive Device (Bed Mobility Goal 1, PT) bed mobility activities, all  -TW     [place] Level/Cues Needed (Bed Mobility Goal  1, PT) contact guard required  -TW     Time Frame (Bed Mobility Goal 1, PT) long term goal (LTG);2 weeks  -TW     Strategies/Barriers (Bed Mobility Goal 1, PT) with use of hospital bed as needed  -TW     Progress/Outcomes (Bed Mobility Goal 1, PT) goal ongoing  -TW       Row Name 07/21/24 1103          Transfer Goal 1 (PT)    Activity/Assistive Device (Transfer Goal 1, PT) sit-to-stand/stand-to-sit;bed-to-chair/chair-to-bed;toilet;walker, lourdes  -TW     [place] Level/Cues Needed (Transfer Goal 1, PT) contact guard required  -TW     Time Frame (Transfer Goal 1, PT) short term goal (STG);1 week  -TW     Progress/Outcome (Transfer Goal 1, PT) goal ongoing  -TW       Row Name 07/21/24 1103          Gait Training Goal 1 (PT)    Activity/Assistive Device (Gait Training Goal 1, PT) other (see comments)  gait goal not being set at this time as pt appears to been nonambulatory. If gait is determined to be appropriate a R double upright orthotic would be appropriate due to pt's strong   spasticity in RLE.  -TW       Row Name 07/21/24 1103          Problem Specific Goal 1 (PT)    Problem Specific Goal 1 (PT) pt to tolerate standing with hemiwalker for 30 sec x 3 with CGA  -TW     Time Frame (Problem Specific Goal 1, PT) short-term goal (STG)  -TW     Progress/Outcome (Problem Specific Goal 1, PT) goal ongoing  -TW       Row Name 07/21/24 1103          Patient Education Goal (PT)    Activity (Patient Education Goal, PT) BLE ther ex 1 x 10  -TW     [place]/Cues/Accuracy (Memory Goal 2, PT) demonstrates adequately  -TW     Time Frame (Patient Education Goal, PT) long term goal (LTG);2 weeks  -TW     Progress/Outcome (Patient Education Goal, PT) goal ongoing  -TW       Row Name 07/21/24 1103          Therapy Assessment/Plan (PT)    Planned Therapy Interventions (PT) balance training;bed mobility training;transfer training;patient/family education;strengthening  -TW               User Key  (r) = Recorded By, (t) = Taken  By, (c) = Cosigned By      Initials Name Provider Type    TW Cherelle Herrera, PT Physical Therapist                   Clinical Impression       Row Name 07/21/24 1103          Pain    Pretreatment Pain Rating 7/10  -TW     Posttreatment Pain Rating 7/10  -TW     Pain Location - Side/Orientation Bilateral  -TW     Pain Location anterior  -TW     Pain Location - head  -TW     Pre/Posttreatment Pain Comment Pt c/o HA that didn't change during evaluation. Pt also had c/o increased pain with any R  shoulder movement.  -TW     Pain Intervention(s) Repositioned  -TW       Row Name 07/21/24 1103          Plan of Care Review    Plan of Care Reviewed With patient  -TW     Outcome Evaluation PT evaluation completed this date with pt presenting supine in bed while on room air. Pt expressively aphasic but did say yes/no to simple questions. No family present and pt's prior functional mobility level not clear. Pt did indicate she had w/c at home and hemiwalker. This morning, pt indicated she did have a headache rated 7/10. Pt presented with R sided spasticity in an extensor pattern with no isolated RUE movement observed and with pt expressing pain with R shoulder movement. RLE has limited R dorsiflexion and tends to pull into supination with mobility. Pt needed mod assist to come to sit on EOB and once in sitting could maintain midline with close SBA x 6 min. Pt came to stand with hemiwalker and min assist with most of her wt through the LLE. Pt pivoted to chair to the L on her LLE with limited isolated RLE movement and extensor tone assisting in keeping R knee from buckling. Pt indicates she does not have a AFO for RLE. Pt does present with deficits in isolated strength on R side, decreased balance, and endurance with activity. Pt is expected to improve her functional mobility with continued PT services prior to d/c.  -TW       Row Name 07/21/24 1103          Therapy Assessment/Plan (PT)    Rehab Potential (PT) good, to achieve  stated therapy goals  -TW     Criteria for Skilled Interventions Met (PT) yes;meets criteria;skilled treatment is necessary  -TW     Therapy Frequency (PT) 5 times/wk  -TW     Predicted Duration of Therapy Intervention (PT) 2 wks  -TW       Row Name 07/21/24 1103          Vital Signs    O2 Delivery Pre Treatment room air  -TW     Pre Patient Position Supine  -TW     Intra Patient Position Standing  -TW     Post Patient Position Sitting  -TW       Row Name 07/21/24 1103          Positioning and Restraints    Pre-Treatment Position in bed  -TW     Post Treatment Position chair  -TW     In Chair notified nsg;reclined;call light within reach;encouraged to call for assist;exit alarm on;legs elevated  -TW               User Key  (r) = Recorded By, (t) = Taken By, (c) = Cosigned By      Initials Name Provider Type    Cherelle Smith PT Physical Therapist                   Outcome Measures       Row Name 07/21/24 1103 07/21/24 0800       How much help from another person do you currently need...    Turning from your back to your side while in flat bed without using bedrails? 3  -TW 3  -HN    Moving from lying on back to sitting on the side of a flat bed without bedrails? 2  -TW 3  -HN    Moving to and from a bed to a chair (including a wheelchair)? 3  -TW 2  -HN    Standing up from a chair using your arms (e.g., wheelchair, bedside chair)? 3  -TW 2  -HN    Climbing 3-5 steps with a railing? 1  -TW 1  -HN    To walk in hospital room? 1  -TW 1  -HN    AM-PAC 6 Clicks Score (PT) 13  -TW 12  -HN    Highest Level of Mobility Goal 4 --> Transfer to chair/commode  -TW 4 --> Transfer to chair/commode  -HN      Row Name 07/21/24 1103          Functional Assessment    Outcome Measure Options AM-PAC 6 Clicks Basic Mobility (PT)  -TW               User Key  (r) = Recorded By, (t) = Taken By, (c) = Cosigned By      Initials Name Provider Type    Cherelle Smith PT Physical Therapist    Krystal Son, RN Registered Nurse                                  Physical Therapy Education       Title: PT OT SLP Therapies (In Progress)       Topic: Physical Therapy (In Progress)       Point: Mobility training (Done)       Learning Progress Summary             Patient Yani, CARMELITA, VU by TW at 7/21/2024 1103    Comment: pt education on purpose of PT evaluation and her inpt PT POC                         Point: Home exercise program (Not Started)       Learner Progress:  Not documented in this visit.              Point: Body mechanics (Not Started)       Learner Progress:  Not documented in this visit.              Point: Precautions (Not Started)       Learner Progress:  Not documented in this visit.                              User Key       Initials Effective Dates Name Provider Type Discipline    [facility] 06/16/21 -  Cherelle Herrera, PT Physical Therapist PT                  PT Recommendation and Plan  Planned Therapy Interventions (PT): balance training, bed mobility training, transfer training, patient/family education, strengthening  Plan of Care Reviewed With: patient  Outcome Evaluation: PT evaluation completed this date with pt presenting supine in bed while on room air. Pt expressively aphasic but did say yes/no to simple questions. No family present and pt's prior functional mobility level not clear. Pt did indicate she had w/c at home and hemiwalker. This morning, pt indicated she did have a headache rated 7/10. Pt presented with R sided spasticity in an extensor pattern with no isolated RUE movement observed and with pt expressing pain with R shoulder movement. RLE has limited R dorsiflexion and tends to pull into supination with mobility. Pt needed mod assist to come to sit on EOB and once in sitting could maintain midline with close SBA x 6 min. Pt came to stand with hemiwalker and min assist with most of her wt through the LLE. Pt pivoted to chair to the L on her LLE with limited isolated RLE movement and extensor tone assisting in keeping R  knee from buckling. Pt indicates she does not have a AFO for RLE. Pt does present with deficits in isolated strength on R side, decreased balance, and endurance with activity. Pt is expected to improve her functional mobility with continued PT services prior to d/c.     Time Calculation:   PT Evaluation Complexity  History, PT Evaluation Complexity: 3 or more personal factors and/or comorbidities  Examination of Body Systems (PT Eval Complexity): total of 4 or more elements  Clinical Presentation (PT Evaluation Complexity): stable  Clinical Decision Making (PT Evaluation Complexity): low complexity  Overall Complexity (PT Evaluation Complexity): low complexity     PT Charges       Row Name 07/21/24 1103             Time Calculation    Stop Time 1103  -TW      PT Received On 07/21/24  -TW      PT Goal Re-Cert Due Date 07/31/24 -TW                User Key  (r) = Recorded By, (t) = Taken By, (c) = Cosigned By      Initials Name Provider Type    TW Cherelle Herrera PT Physical Therapist                  Therapy Charges for Today       Code Description Service Date Service Provider Modifiers Qty    04035906973  PT EVAL LOW COMPLEXITY 3 7/21/2024 Cherelle Herrera PT GP 1            PT G-Codes  Outcome Measure Options: AM-PAC 6 Clicks Basic Mobility (PT)  AM-PAC 6 Clicks Score (PT): 13  PT Discharge Summary  Anticipated Discharge Disposition (PT): other (see comments) (Not clear at this time. Pt does need assist with all mobility and ADLs.)    Cherelle Herrera PT  7/21/2024      Electronically signed by Cherelle Herrera PT at 07/21/24 1434       Eliceo Matos, PTA at 07/22/24 1338  Version 1 of 1         Goal Outcome Evaluation:  Plan of Care Reviewed With: patient        Progress: improving  Outcome Evaluation: Pt  supine in bed and willing to participate with treatment. Pt performed bed mobility with min a. Pt with use of lourdes walker performed STS with min a and pt performed pivot transfer from bed to chair with min a with  lourdes walker. Pt performed exercises per flowsheet. Cont PT per POC progressing to goals as pt tolerates.                                 Electronically signed by Eliceo Matos PTA at 24 1536       Eliceo Matos PTA at 24 9505  Version 1 of 1         Patient Name: Roxi Valerio  : 1975    MRN: [id number]                              Today's Date: 2024       Admit Date: 2024    Visit Dx:     ICD-10-CM ICD-9-CM   1. Unable to care for self  Z78.9 V49.89     Patient Active Problem List   Diagnosis    Influenza A    Unable to care for self    Hypertensive urgency     Past Medical History:   Diagnosis Date    COPD (chronic obstructive pulmonary disease)     Hypertension     Paralysis due to old stroke     right sided    Stroke     TBI (traumatic brain injury)      Past Surgical History:   Procedure Laterality Date    BRAIN SURGERY      TUBAL ABDOMINAL LIGATION        General Information       Row Name 24 1526          Physical Therapy Time and Intention    Document Type therapy note (daily note)  -RM     Mode of Treatment physical therapy  -       Row Name 24 1526          General Information    Patient Profile Reviewed yes  -RM     Existing Precautions/Restrictions fall;seizures  -       Row Name 24 1526          Cognition    Orientation Status (Cognition) oriented to;person;unable/difficult to assess  -RM       Row Name 24 1526          Safety Issues, Functional Mobility    Safety Issues Affecting Function (Mobility) friction/shear risk;problem-solving  -     Impairments Affecting Function (Mobility) balance;cognition;grasp;muscle tone abnormal;motor planning;range of motion (ROM);strength;endurance/activity tolerance;coordination;motor control;pain  -RM               User Key  (r) = Recorded By, (t) = Taken By, (c) = Cosigned By      Initials Name Provider Type    RM Eliceo Matos, PTA Physical Therapist Assistant                   Mobility        [facility] Name 07/22/24 1528          Bed Mobility    Supine-Sit [place] (Bed Mobility) minimum assist (75% patient effort);verbal cues;nonverbal cues (demo/gesture)  -RM     Assistive Device (Bed Mobility) head of bed elevated;bed rails  -RM       [facility] Name 07/22/24 1528          Bed-Chair Transfer    Bed-Chair [place] (Transfers) minimum assist (75% patient effort);nonverbal cues (demo/gesture);verbal cues  -RM     Assistive Device (Bed-Chair Transfers) walker, lourdes  -RM       [facility] Name 07/22/24 1528          Sit-Stand Transfer    Sit-Stand [place] (Transfers) minimum assist (75% patient effort);nonverbal cues (demo/gesture);verbal cues  -RM     Assistive Device (Sit-Stand Transfers) walker, lourdes  -RM       Row Name 07/22/24 1528          Gait/Stairs (Locomotion)    [place] Level (Gait) not tested  -RM               User Key  (r) = Recorded By, (t) = Taken By, (c) = Cosigned By      Initials Name Provider Type    Eliceo López, MITCH Physical Therapist Assistant                   Obj/Interventions       Row Name 07/22/24 1529          Motor Skills    Motor Skills --  AROM L LE x 10 reps all planes , R LE AAROM x 10 reps all planes  -RM               User Key  (r) = Recorded By, (t) = Taken By, (c) = Cosigned By      Initials Name Provider Type    Eliceo López, MITCH Physical Therapist Assistant                   Goals/Plan    No documentation.                  Clinical Impression       Row Name 07/22/24 1531          Pain    Additional Documentation Pain Scale: FACES Pre/Post-Treatment (Group)  -RM       Row Name 07/22/24 1531          Pain Scale: FACES Pre/Post-Treatment    Pain: FACES Scale, Pretreatment 8-->hurts whole lot  -RM     Posttreatment Pain Rating 8-->hurts whole lot  -RM     Pain Location - head  -RM       Row Name 07/22/24 1531          Plan of Care Review    Plan of Care Reviewed With patient  -RM     Progress improving  -RM     Outcome Evaluation Pt  supine in bed and  willing to participate with treatment. Pt performed bed mobility with min a. Pt with use of lourdes walker performed STS with min a and pt performed pivot transfer from bed to chair with min a with lourdes walker. Pt performed exercises per flowsheet. Cont PT per POC progressing to goals as pt tolerates.  -               User Key  (r) = Recorded By, (t) = Taken By, (c) = Cosigned By      Initials Name Provider Type    Eliceo López, PTA Physical Therapist Assistant                   Outcome Measures       Row Name 07/22/24 1535 07/22/24 0800       How much help from another person do you currently need...    Turning from your back to your side while in flat bed without using bedrails? 3  -RM 3  -HN    Moving from lying on back to sitting on the side of a flat bed without bedrails? 3  -RM 2  -HN    Moving to and from a bed to a chair (including a wheelchair)? 3  -RM 3  -HN    Standing up from a chair using your arms (e.g., wheelchair, bedside chair)? 3  -RM 3  -HN    Climbing 3-5 steps with a railing? 1  -RM 1  -HN    To walk in hospital room? 1  -RM 1  -HN    AM-PAC 6 Clicks Score (PT) 14  -RM 13  -HN    Highest Level of Mobility Goal 4 --> Transfer to chair/commode  -RM 4 --> Transfer to chair/commode  -HN      Row Name 07/22/24 1535          Functional Assessment    Outcome Measure Options AM-PAC 6 Clicks Basic Mobility (PT)  -               User Key  (r) = Recorded By, (t) = Taken By, (c) = Cosigned By      Initials Name Provider Type    Eliceo López, MITCH Physical Therapist Assistant    Krystal Son RN Registered Nurse                                 Physical Therapy Education       Title: PT OT SLP Therapies (In Progress)       Topic: Physical Therapy (In Progress)       Point: Mobility training (Done)       Learning Progress Summary             Patient Acceptance, E,TB,D, VU,NR by  at 7/22/2024 1536    Acceptance, E, VU by  at 7/21/2024 1103    Comment: pt education on purpose of PT  evaluation and her inpt PT POC                         Point: Home exercise program (Done)       Learning Progress Summary             Patient Acceptance, E,TB,D, VU,NR by  at 7/22/2024 1536                         Point: Body mechanics (Not Started)       Learner Progress:  Not documented in this visit.              Point: Precautions (Not Started)       Learner Progress:  Not documented in this visit.                              User Key       Initials Effective Dates Name Provider Type Discipline     06/16/21 -  Eliceo Matos, MITCH Physical Therapist Assistant PT    TW 06/16/21 -  Cherelle Herrera PT Physical Therapist PT                  PT Recommendation and Plan     Plan of Care Reviewed With: patient  Progress: improving  Outcome Evaluation: Pt  supine in bed and willing to participate with treatment. Pt performed bed mobility with min a. Pt with use of lourdes walker performed STS with min a and pt performed pivot transfer from bed to chair with min a with lourdes walker. Pt performed exercises per flowsheet. Cont PT per POC progressing to goals as pt tolerates.     Time Calculation:         PT Charges       Row Name 07/22/24 1536             Time Calculation    Start Time 1338  -RM      Stop Time 1416  -RM      Time Calculation (min) 38 min  -RM      PT Received On 07/22/24  -RM      PT Goal Re-Cert Due Date 07/31/24  -RM         Time Calculation- PT    Total Timed Code Minutes- PT 38 minute(s)  -RM         Timed Charges    10421 - PT Therapeutic Exercise Minutes 15  -RM      94304 - PT Therapeutic Activity Minutes 23  -RM         Total Minutes    Timed Charges Total Minutes 38  -RM       Total Minutes 38  -RM                User Key  (r) = Recorded By, (t) = Taken By, (c) = Cosigned By      Initials Name Provider Type     Eliceo Matos, MITCH Physical Therapist Assistant                  Therapy Charges for Today       Code Description Service Date Service Provider Modifiers Qty    10165223543  PT  THER PROC EA 15 MIN 2024 Eliceo Matos, PTA GP 1    [id number] HC PT THERAPEUTIC ACT EA 15 MIN 2024 Eliceo Matos, MITCH GP 2            PT G-Codes  Outcome Measure Options: AM-PAC 6 Clicks Basic Mobility (PT)  AM-PAC 6 Clicks Score (PT): 14       Eliceo Matos PTA  2024      Electronically signed by Eliceo Matos PTA at 24 1538       Eliceo Matos PTA at 24 1457  Version 1 of 1         Goal Outcome Evaluation:  Plan of Care Reviewed With: patient        Progress: improving  Outcome Evaluation: Pt  supine in bed and willing to participate with treatment. Pt performed bed mobility with min a. Pt with use of lourdes walker performed STS with min a and pt performed pivot transfer from bed to chair with min a with lourdes walker. Pt performed exercises per flowsheet. Cont PT per POC progressing to goals as pt tolerates.                                 Electronically signed by Eliceo Matos PTA at 24 1606       Eliceo Matos PTA at 24 1457  Version 1 of 1         Patient Name: Roxi Valerio  : 1975    MRN: [id number]                              Today's Date: 2024       Admit Date: 2024    Visit Dx:     ICD-10-CM ICD-9-CM   1. Unable to care for self  Z78.9 V49.89     Patient Active Problem List   Diagnosis    Influenza A    Unable to care for self    Hypertensive urgency     Past Medical History:   Diagnosis Date    COPD (chronic obstructive pulmonary disease)     Hypertension     Paralysis due to old stroke     right sided    Stroke     TBI (traumatic brain injury)      Past Surgical History:   Procedure Laterality Date    BRAIN SURGERY      TUBAL ABDOMINAL LIGATION        General Information       Row Name 24 [id number]          Physical Therapy Time and Intention    Document Type therapy note (daily note)  -RM     Mode of Treatment physical therapy  -RM       Row Name 24 5477          General Information    Patient Profile Reviewed  yes  -RM     Existing Precautions/Restrictions fall;seizures  -RM       Row Name 07/23/24 1554          Cognition    Orientation Status (Cognition) oriented to;person;unable/difficult to assess  -RM       Row Name 07/23/24 1554          Safety Issues, Functional Mobility    Safety Issues Affecting Function (Mobility) friction/shear risk;safety precaution awareness;safety precautions follow-through/compliance  -RM     Impairments Affecting Function (Mobility) balance;cognition;grasp;muscle tone abnormal;motor planning;range of motion (ROM);strength;endurance/activity tolerance;coordination;motor control;pain  -RM               User Key  (r) = Recorded By, (t) = Taken By, (c) = Cosigned By      Initials Name Provider Type    Eliceo López PTA Physical Therapist Assistant                   Mobility       Row Name 07/23/24 1601          Bed Mobility    Supine-Sit [place] (Bed Mobility) minimum assist (75% patient effort);verbal cues;nonverbal cues (demo/gesture);contact guard  -RM     Assistive Device (Bed Mobility) head of bed elevated;bed rails  -RM       Row Name 07/23/24 1601          Bed-Chair Transfer    Bed-Chair [place] (Transfers) contact guard;minimum assist (75% patient effort);verbal cues;nonverbal cues (demo/gesture)  -RM     Assistive Device (Bed-Chair Transfers) walker, lourdes  -RM       Row Name 07/23/24 1601          Sit-Stand Transfer    Sit-Stand [place] (Transfers) minimum assist (75% patient effort);nonverbal cues (demo/gesture);verbal cues  -RM     Assistive Device (Sit-Stand Transfers) walker, lourdes  -RM               User Key  (r) = Recorded By, (t) = Taken By, (c) = Cosigned By      Initials Name Provider Type    Eliceo López PTA Physical Therapist Assistant                   Obj/Interventions       Row Name 07/23/24 1602          Motor Skills    Motor Skills --  AROM with L LE and AAROM/PROM R LE x10-15 reps.  -RM               User Key  (r) = Recorded By, (t) =  Taken By, (c) = Cosigned By      Initials Name Provider Type    Eliceo López, MITCH Physical Therapist Assistant                   Goals/Plan    No documentation.                  Clinical Impression       Row Name 07/23/24 1604          Pain Scale: FACES Pre/Post-Treatment    Pain: FACES Scale, Pretreatment 8-->hurts whole lot  -RM     Posttreatment Pain Rating 8-->hurts whole lot  -RM     Pain Location - head  -RM       Row Name 07/23/24 1604          Plan of Care Review    Plan of Care Reviewed With patient  -RM     Progress improving  -RM       Row Name 07/23/24 1604          Vital Signs    O2 Delivery Pre Treatment room air  -RM     O2 Delivery Intra Treatment room air  -RM     O2 Delivery Post Treatment room air  -RM       Row Name 07/23/24 1604          Positioning and Restraints    Pre-Treatment Position in bed  -RM     Post Treatment Position chair  -RM     In Chair reclined;call light within reach;encouraged to call for assist;exit alarm on;notified nsg  -RM               User Key  (r) = Recorded By, (t) = Taken By, (c) = Cosigned By      Initials Name Provider Type    Eliceo López, MITCH Physical Therapist Assistant                   Outcome Measures       Row Name 07/23/24 1605          How much help from another person do you currently need...    Turning from your back to your side while in flat bed without using bedrails? 4  -RM     Moving from lying on back to sitting on the side of a flat bed without bedrails? 4  -RM     Moving to and from a bed to a chair (including a wheelchair)? 3  -RM     Standing up from a chair using your arms (e.g., wheelchair, bedside chair)? 3  -RM     Climbing 3-5 steps with a railing? 1  -RM     To walk in hospital room? 1  -RM     AM-PAC 6 Clicks Score (PT) 16  -RM     Highest Level of Mobility Goal 5 --> Static standing  -RM       Row Name 07/23/24 1605          Functional Assessment    Outcome Measure Options AM-PAC 6 Clicks Basic Mobility (PT)  -RM                User Key  (r) = Recorded By, (t) = Taken By, (c) = Cosigned By      Initials Name Provider Type     Eliceo Matos, PTA Physical Therapist Assistant                                 Physical Therapy Education       Title: PT OT SLP Therapies (In Progress)       Topic: Physical Therapy (In Progress)       Point: Mobility training (Done)       Learning Progress Summary             Patient Acceptance, E,TB,D, VU,NR by  at 7/22/2024 1536    Acceptance, E, VU by  at 7/21/2024 1103    Comment: pt education on purpose of PT evaluation and her inpt PT POC                         Point: Home exercise program (Done)       Learning Progress Summary             Patient Acceptance, E,TB,D, VU,NR by  at 7/23/2024 1605    Acceptance, E,TB,D, VU,NR by  at 7/22/2024 1536                         Point: Body mechanics (Not Started)       Learner Progress:  Not documented in this visit.              Point: Precautions (Not Started)       Learner Progress:  Not documented in this visit.                              User Key       Initials Effective Dates Name Provider Type Discipline     06/16/21 -  Eliceo Matos PTA Physical Therapist Assistant PT     06/16/21 -  Cherelle Herrera, LIAM Physical Therapist PT                  PT Recommendation and Plan     Plan of Care Reviewed With: patient  Progress: improving  Outcome Evaluation: Pt  supine in bed and willing to participate with treatment. Pt performed bed mobility with min a. Pt with use of lourdes walker performed STS with min a and pt performed pivot transfer from bed to chair with min a with lourdes walker. Pt performed exercises per flowsheet. Cont PT per POC progressing to goals as pt tolerates.     Time Calculation:         PT Charges       Row Name 07/23/24 1606             Time Calculation    Start Time 1457  -RM      Stop Time 1523  -RM      Time Calculation (min) 26 min  -RM      PT Received On 07/23/24  -RM      PT Goal Re-Cert Due Date 07/31/24  -RM          Time Calculation- PT    Total Timed Code Minutes- PT 26 minute(s)  -RM         Timed Charges    79058 - PT Therapeutic Exercise Minutes 16  -RM      38545 - PT Therapeutic Activity Minutes 10  -RM         Total Minutes    Timed Charges Total Minutes 26  -RM       Total Minutes 26  -RM                User Key  (r) = Recorded By, (t) = Taken By, (c) = Cosigned By      Initials Name Provider Type    Eliceo López, PTA Physical Therapist Assistant                  Therapy Charges for Today       Code Description Service Date Service Provider Modifiers Qty    56960466139 HC PT THER PROC EA 15 MIN 2024 Eliceo Matos, PTA GP 1    52415126232 HC PT THERAPEUTIC ACT EA 15 MIN 2024 Eliceo Matos, MITCH GP 2    72585495652 HC PT THER PROC EA 15 MIN 2024 Eliceo Matos, MITCH GP 1    40261291493 HC PT THERAPEUTIC ACT EA 15 MIN 2024 Eliceo Matos, PTA GP 1            PT G-Codes  Outcome Measure Options: AM-PAC 6 Clicks Basic Mobility (PT)  AM-PAC 6 Clicks Score (PT): 16  AM-PAC 6 Clicks Score (OT): 13       Eliceo Matos PTA  2024      Electronically signed by Eliceo Matos PTA at 24 1607          Occupational Therapy Notes (last 72 hours)        Osiris Donnelly at 24 1628          Patient Name: Roxi Valerio  : 1975    MRN: [id number]                              Today's Date: 2024       Admit Date: 2024    Visit Dx:     ICD-10-CM ICD-9-CM   1. Unable to care for self  Z78.9 V49.89     Patient Active Problem List   Diagnosis    Influenza A    Unable to care for self    Hypertensive urgency     Past Medical History:   Diagnosis Date    COPD (chronic obstructive pulmonary disease)     Hypertension     Paralysis due to old stroke     right sided    Stroke     TBI (traumatic brain injury)      Past Surgical History:   Procedure Laterality Date    BRAIN SURGERY      TUBAL ABDOMINAL LIGATION        General Information       Row Name 24 [id number]           OT Time and Intention    Document Type therapy note (daily note)  -     Mode of Treatment occupational therapy  -       Row Name 07/23/24 1616          General Information    Patient Profile Reviewed yes  -     Existing Precautions/Restrictions fall;seizures  -     Barriers to Rehab medically complex;previous functional deficit;cognitive status;language barrier  -               User Key  (r) = Recorded By, (t) = Taken By, (c) = Cosigned By      Initials Name Provider Type     Osiris Donnelly Occupational Therapist                     Mobility/ADL's       Row Name 07/23/24 1617          Bed Mobility    Bed Mobility supine-sit  -     Supine-Sit [place] (Bed Mobility) minimum assist (75% patient effort);verbal cues;nonverbal cues (demo/gesture);contact guard  -     Assistive Device (Bed Mobility) head of bed elevated;bed rails  -[facility] Name 07/23/24 1617          Transfers    Transfers sit-stand transfer  -[facility] Name 07/23/24 1617          Bed-Chair Transfer    Bed-Chair [place] (Transfers) contact guard;minimum assist (75% patient effort);verbal cues;nonverbal cues (demo/gesture)  -     Assistive Device (Bed-Chair Transfers) walker, lourdes  -[facility] Name 07/23/24 1617          Sit-Stand Transfer    Sit-Stand [place] (Transfers) minimum assist (75% patient effort);nonverbal cues (demo/gesture);verbal cues  -     Assistive Device (Sit-Stand Transfers) walker, lourdes  -[facility] Name 07/23/24 1617          Lower Body Dressing Assessment/Training    [place] Level (Lower Body Dressing) don;socks;set up;standby assist  -               User Key  (r) = Recorded By, (t) = Taken By, (c) = Cosigned By      Initials Name Provider Type     Osiris Donnelly Occupational Therapist                   Obj/Interventions       Row Name 07/23/24 1618          Shoulder (Therapeutic Exercise)    Shoulder (Therapeutic Exercise) strengthening exercise  -     Shoulder Strengthening  (Therapeutic Exercise) left;flexion;extension;horizontal aBduction/aDduction;resistance band;red;15 repititions  -[facility] Name 07/23/24 1618          Elbow/Forearm (Therapeutic Exercise)    Elbow/Forearm (Therapeutic Exercise) strengthening exercise  -     Elbow/Forearm Strengthening (Therapeutic Exercise) left;flexion;extension;resistance band;red;15 repititions  -[facility] Name 07/23/24 1618          Motor Skills    Therapeutic Exercise shoulder;elbow/forearm  -               User Key  (r) = Recorded By, (t) = Taken By, (c) = Cosigned By      Initials Name Provider Type    Osiris Marte Occupational Therapist                   Goals/Plan    No documentation.                  Clinical Impression       [facility] Name 07/23/24 1619          Pain Assessment    Pain Intervention(s) Repositioned;Ambulation/increased activity  -[facility] Name 07/23/24 1619          Pain Scale: FACES Pre/Post-Treatment    Pain: FACES Scale, Pretreatment 8-->hurts whole lot  -     Posttreatment Pain Rating 8-->hurts whole lot  -     Pain Location - head  -[facility] Name 07/23/24 1619          Plan of Care Review    Plan of Care Reviewed With patient  -     Progress improving  -     Outcome Evaluation Pt seen for therapy today.  Pt sat eob with min assist, able to don her socks with set up of necessary items.  Pt stood with min assist and transferred from bed to chair min assist using lourdes walker.  Pt able to perform LUE strengthening ex using red theraband for resistance.  Pt progressing with therapy.  Cont OT per POC.  -[facility] Name 07/23/24 1619          Positioning and Restraints    Pre-Treatment Position in bed  -     Post Treatment Position chair  -     In Chair reclined;call light within reach;encouraged to call for assist;exit alarm on  -               User Key  (r) = Recorded By, (t) = Taken By, (c) = Cosigned By      Initials Name Provider Type    Osiris Marte Occupational Therapist                    Outcome Measures       Row Name 07/23/24 1626          How much help from another is currently needed...    Putting on and taking off regular lower body clothing? 3  -AH     Bathing (including washing, rinsing, and drying) 2  -AH     Toileting (which includes using toilet bed pan or urinal) 2  -AH     Putting on and taking off regular upper body clothing 2  -AH     Taking care of personal grooming (such as brushing teeth) 2  -AH     Eating meals 3  -AH     AM-PAC 6 Clicks Score (OT) 14  -AH       Row Name 07/23/24 1605          How much help from another person do you currently need...    Turning from your back to your side while in flat bed without using bedrails? 4  -RM     Moving from lying on back to sitting on the side of a flat bed without bedrails? 4  -RM     Moving to and from a bed to a chair (including a wheelchair)? 3  -RM     Standing up from a chair using your arms (e.g., wheelchair, bedside chair)? 3  -RM     Climbing 3-5 steps with a railing? 1  -RM     To walk in hospital room? 1  -RM     AM-PAC 6 Clicks Score (PT) 16  -RM     Highest Level of Mobility Goal 5 --> Static standing  -RM       Row Name 07/23/24 1626 07/23/24 1605       Functional Assessment    Outcome Measure Options AM-PAC 6 Clicks Daily Activity (OT)  - AM-PAC 6 Clicks Basic Mobility (PT)  -RM              User Key  (r) = Recorded By, (t) = Taken By, (c) = Cosigned By      Initials Name Provider Type    Osiris Marte Occupational Therapist    Eliceo López, PTA Physical Therapist Assistant                    Occupational Therapy Education       Title: PT OT SLP Therapies (In Progress)       Topic: Occupational Therapy (In Progress)       Point: ADL training (Done)       Description:   Instruct learner(s) on proper safety adaptation and remediation techniques during self care or transfers.   Instruct in proper use of assistive devices.                  Learning Progress Summary             Patient Acceptance,  E,TB, VU by  at 7/23/2024 1627    Comment: benefit of working with therapy    Acceptance, E,TB, VU,NR by  at 7/22/2024 1623    Comment: Pt educated on ADL retraining. Pt will require further education for follow through.                         Point: Home exercise program (Done)       Description:   Instruct learner(s) on appropriate technique for monitoring, assisting and/or progressing therapeutic exercises/activities.                  Learning Progress Summary             Patient Acceptance, E,TB, VU by  at 7/23/2024 1627    Comment: benefit of working with therapy                         Point: Precautions (Not Started)       Description:   Instruct learner(s) on prescribed precautions during self-care and functional transfers.                  Learner Progress:  Not documented in this visit.              Point: Body mechanics (Not Started)       Description:   Instruct learner(s) on proper positioning and spine alignment during self-care, functional mobility activities and/or exercises.                  Learner Progress:  Not documented in this visit.                              User Key       Initials Effective Dates Name Provider Type Discipline     06/16/21 -  Osiris Donnelly Occupational Therapist OT     07/06/23 -  Michael Schofield OT Occupational Therapist OT                  OT Recommendation and Plan     Plan of Care Review  Plan of Care Reviewed With: patient  Progress: improving  Outcome Evaluation: Pt seen for therapy today.  Pt sat eob with min assist, able to don her socks with set up of necessary items.  Pt stood with min assist and transferred from bed to chair min assist using lourdes walker.  Pt able to perform LUE strengthening ex using red theraband for resistance.  Pt progressing with therapy.  Cont OT per POC.     Time Calculation:         Time Calculation- OT       Row Name 07/23/24 1627             Time Calculation- OT    OT Start Time 1458  -      OT Stop Time 1523  -       OT Time Calculation (min) 25 min  -AH      OT Received On 24  -      OT Goal Re-Cert Due Date 24  -         Timed Charges    03235 - OT Therapeutic Exercise Minutes 10  -AH      29488 - OT Therapeutic Activity Minutes 10  -      57404 - OT Self Care/Mgmt Minutes 5  -AH         Total Minutes    Timed Charges Total Minutes 25  -AH       Total Minutes 25  -AH                User Key  (r) = Recorded By, (t) = Taken By, (c) = Cosigned By      Initials Name Provider Type    Osiris Marte Occupational Therapist                  Therapy Charges for Today       Code Description Service Date Service Provider Modifiers Qty    42218470368 HC OT THER PROC EA 15 MIN 2024 Osiris Donnelly  1    43402124625 HC OT THERAPEUTIC ACT EA 15 MIN 2024 Osiris Donnelly  1                 Osiris Donnelly  2024    Electronically signed by Osiris Donnelly at 24 1628       Osiris Donnelly at 24 1627          Goal Outcome Evaluation:  Plan of Care Reviewed With: patient        Progress: improving  Outcome Evaluation: Pt seen for therapy today.  Pt sat eob with min assist, able to don her socks with set up of necessary items.  Pt stood with min assist and transferred from bed to chair min assist using lourdes walker.  Pt able to perform LUE strengthening ex using red theraband for resistance.  Pt progressing with therapy.  Cont OT per POC.                                 Electronically signed by Osiris Donnelly at 24 162       Michael Schofield OT at 24 1624          Patient Name: Roxi Valerio  : 1975    MRN: [id number]                              Today's Date: 2024       Admit Date: 2024    Visit Dx:     ICD-10-CM ICD-9-CM   1. Unable to care for self  Z78.9 V49.89     Patient Active Problem List   Diagnosis    Influenza A    Unable to care for self    Hypertensive urgency     Past Medical History:   Diagnosis Date    COPD (chronic obstructive pulmonary disease)      Hypertension     Paralysis due to old stroke     right sided    Stroke     TBI (traumatic brain injury)      Past Surgical History:   Procedure Laterality Date    BRAIN SURGERY      TUBAL ABDOMINAL LIGATION        General Information       Row Name 07/22/24 1606          OT Time and Intention    Document Type evaluation  -DB     Mode of Treatment occupational therapy  -DB       Row Name 07/22/24 1606          General Information    Patient Profile Reviewed yes  -DB     Prior Level of Function max assist:;ADL's;dependent:  -DB     Existing Precautions/Restrictions fall;seizures  -DB     Barriers to Rehab medically complex;previous functional deficit;cognitive status  -DB       Row Name 07/22/24 1606          Occupational Profile    Reason for Services/Referral (Occupational Profile) ADL decline  -DB       Row Name 07/22/24 1606          Living Environment    People in Home spouse;child(beti), adult;grandchild(beti)  -DB       Row Name 07/22/24 1606          Home Main Entrance    Number of Stairs, Main Entrance none  -DB       Row Name 07/22/24 1606          Cognition    Orientation Status (Cognition) oriented to;person;unable/difficult to assess  -DB       Row Name 07/22/24 1606          Safety Issues, Functional Mobility    Safety Issues Affecting Function (Mobility) friction/shear risk;safety precaution awareness;safety precautions follow-through/compliance;insight into deficits/self-awareness;problem-solving  -DB     Impairments Affecting Function (Mobility) balance;cognition;grasp;muscle tone abnormal;motor planning;range of motion (ROM);strength;endurance/activity tolerance;coordination;motor control;pain  -DB     Cognitive Impairments, Mobility Safety/Performance safety precaution awareness;safety precaution follow-through;sequencing abilities  -DB               User Key  (r) = Recorded By, (t) = Taken By, (c) = Cosigned By      Initials Name Provider Type    DB Michael Schofield OT Occupational Therapist                      Mobility/ADL's       Row Name 07/22/24 1610          Bed Mobility    Bed Mobility supine-sit  -DB     Supine-Sit [place] (Bed Mobility) minimum assist (75% patient effort);verbal cues;nonverbal cues (demo/gesture)  -DB     Assistive Device (Bed Mobility) head of bed elevated;bed rails  -DB       Row Name 07/22/24 1610          Bed-Chair Transfer    Bed-Chair [place] (Transfers) minimum assist (75% patient effort);nonverbal cues (demo/gesture);verbal cues  -DB     Assistive Device (Bed-Chair Transfers) walker, lourdes  -DB       [facility] Name 07/22/24 1610          Sit-Stand Transfer    Sit-Stand [place] (Transfers) minimum assist (75% patient effort);nonverbal cues (demo/gesture);verbal cues  -DB     Assistive Device (Sit-Stand Transfers) walker, lourdes  -DB       [facility] Name 07/22/24 1610          Functional Mobility    Patient was able to Ambulate no, other medical factors prevent ambulation  -DB       Row Name 07/22/24 1610          Activities of Daily Living    BADL Assessment/Intervention bathing;upper body dressing;lower body dressing;grooming;feeding;toileting  -DB       Row Name 07/22/24 1610          Bathing Assessment/Intervention    [place] Level (Bathing) bathing skills;maximum assist (25% patient effort)  -DB       Row Name 07/22/24 1610          Upper Body Dressing Assessment/Training    [place] Level (Upper Body Dressing) upper body dressing skills;maximum assist (25% patient effort)  -DB       Row Name 07/22/24 1610          Lower Body Dressing Assessment/Training    [place] Level (Lower Body Dressing) lower body dressing skills;maximum assist (25% patient effort)  -DB       Row Name 07/22/24 1610          Grooming Assessment/Training    [place] Level (Grooming) grooming skills;hair care, combing/brushing;wash face, hands;set up;verbal cues;nonverbal cues (demo/gesture)  -DB       Row Name 07/22/24 1610          Toileting Assessment/Training    [place]  Level (Toileting) toileting skills;change pad/brief;perform perineal hygiene;maximum assist (25% patient effort)  -DB               User Key  (r) = Recorded By, (t) = Taken By, (c) = Cosigned By      Initials Name Provider Type    Michael Latham OT Occupational Therapist                   Obj/Interventions       Row Name 07/22/24 1612          Range of Motion Comprehensive    General Range of Motion upper extremity range of motion deficits identified  -DB     Comment, General Range of Motion Increased spasticity of RUE. (R) hand flexed. Placed rolled up wash cloth in hand and educated pt on need for splint for contracture mgmt.  -DB       Row Name 07/22/24 1612          Strength Comprehensive (MMT)    General Manual Muscle Testing (MMT) Assessment upper extremity strength deficits identified  -DB     Comment, General Manual Muscle Testing (MMT) Assessment LUE grossly 3+/5, RUE-0/5  -DB               User Key  (r) = Recorded By, (t) = Taken By, (c) = Cosigned By      Initials Name Provider Type    Michael Latham OT Occupational Therapist                   Goals/Plan       Row Name 07/22/24 1621          Bed Mobility Goal 1 (OT)    Activity/Assistive Device (Bed Mobility Goal 1, OT) bed mobility activities, all  -DB     [place] Level/Cues Needed (Bed Mobility Goal 1, OT) standby assist  -DB     Time Frame (Bed Mobility Goal 1, OT) by discharge  -DB     Progress/Outcomes (Bed Mobility Goal 1, OT) goal ongoing  -DB       Row Name 07/22/24 1621          Transfer Goal 1 (OT)    Activity/Assistive Device (Transfer Goal 1, OT) toilet;walker, lourdes  -DB     [place] Level/Cues Needed (Transfer Goal 1, OT) contact guard required  -DB     Time Frame (Transfer Goal 1, OT) by discharge  -DB     Progress/Outcome (Transfer Goal 1, OT) goal ongoing  -DB       Row Name 07/22/24 1621          Bathing Goal 1 (OT)    Activity/Device (Bathing Goal 1, OT) upper body bathing  -DB     [place] Level/Cues  Needed (Bathing Goal 1, OT) set-up required  -DB     Time Frame (Bathing Goal 1, OT) by discharge  -DB     Progress/Outcomes (Bathing Goal 1, OT) goal ongoing  -DB       Row Name 07/22/24 1621          Toileting Goal 1 (OT)    Activity/Device (Toileting Goal 1, OT) toileting skills, all  -DB     [place] Level/Cues Needed (Toileting Goal 1, OT) minimum assist (75% or more patient effort)  -DB     Time Frame (Toileting Goal 1, OT) by discharge  -DB     Progress/Outcome (Toileting Goal 1, OT) goal ongoing  -DB       Row Name 07/22/24 1621          Grooming Goal 1 (OT)    Activity/Device (Grooming Goal 1, OT) grooming skills, all  -DB     [place] (Grooming Goal 1, OT) modified independence  -DB     Time Frame (Grooming Goal 1, OT) by discharge  -DB     Progress/Outcome (Grooming Goal 1, OT) goal ongoing  -DB       Row Name 07/22/24 1621          ROM Goal 1 (OT)    ROM Goal 1 (OT) Pt to tolerate PROM to RUE for contracture mgmt  -DB     Time Frame (ROM Goal 1, OT) long term goal (LTG)  -DB     Progress/Outcome (ROM Goal 1, OT) goal ongoing  -DB       Row Name 07/22/24 1621          Strength Goal 1 (OT)    Strength Goal 1 (OT) Pt to tolerate resistance band exercises to increase strength in LUE  -DB     Time Frame (Strength Goal 1, OT) long term goal (LTG)  -DB     Progress/Outcome (Strength Goal 1, OT) goal ongoing  -DB       Row Name 07/22/24 1621          Therapy Assessment/Plan (OT)    Planned Therapy Interventions (OT) activity tolerance training;adaptive equipment training;BADL retraining;functional balance retraining;occupation/activity based interventions;ROM/therapeutic exercise;strengthening exercise;transfer/mobility retraining;neuromuscular control/coordination retraining;passive ROM/stretching;patient/caregiver education/training  -DB               User Key  (r) = Recorded By, (t) = Taken By, (c) = Cosigned By      Initials Name Provider Type    Michael Latham, OT Occupational Therapist  "                  Clinical Impression       Row Name 07/22/24 1614          Pain Assessment    Pain Intervention(s) Repositioned  -DB       Row Name 07/22/24 1614          Pain Scale: FACES Pre/Post-Treatment    Pain: FACES Scale, Pretreatment 8-->hurts whole lot  -DB     Posttreatment Pain Rating 8-->hurts whole lot  -DB     Pain Location - head  -DB       Row Name 07/22/24 1614          Plan of Care Review    Plan of Care Reviewed With patient  -DB     Progress no change  -DB     Outcome Evaluation OT eval completed. Pt able to nod 'yes\" when asked if she was doing ok this date. Pt able to complete (L) <> (R) bed mobility with min A to complete brief change. Pt is max A for brief change and perineal hygiene. Pt able to complete supine to sit EOB with min A. Pt requires max A to don (B) socks. Pt able to complete STS with min A and lourdes walker. Pt able to complete stand pivot transfer bed to chair with min A and lourdes walker. Pt able to perform grooming routine of face washing and combing hair with set up and demonstration. Pt RUE is limited in ROM and noted to have increased spasticity. (R) hand flexed. OT placed rolled up wash cloth in hand for contracture mgmt. OT educated pt to remove wash cloth if it became painful. Pt able to nod head yes to verbalize understanding. Pt has potential to be more (I) with therapy when OT goals are addressed. Pt to benefit from skilled OT services to address ROM, strength, endurance, transfers, and ADL mgmt. Pt to benefit from d/c to STR/LTC once medically appropriate.  -DB       Row Name 07/22/24 1614          Therapy Assessment/Plan (OT)    Rehab Potential (OT) good, to achieve stated therapy goals  -DB     Criteria for Skilled Therapeutic Interventions Met (OT) yes;skilled treatment is necessary  -DB     Therapy Frequency (OT) 5 times/wk  -DB       Row Name 07/22/24 1614          Therapy Plan Review/Discharge Plan (OT)    Anticipated Discharge Disposition (OT) inpatient " rehabilitation facility;[facility] ([facility])  -DB       Row Name 07/22/24 1614          Vital Signs    O2 Delivery Pre Treatment room air  -DB     O2 Delivery Intra Treatment room air  -DB     O2 Delivery Post Treatment room air  -DB     Pre Patient Position Supine  -DB     Intra Patient Position Standing  -DB     Post Patient Position Sitting  -DB       Row Name 07/22/24 1614          Positioning and Restraints    Pre-Treatment Position in bed  -DB     Post Treatment Position chair  -DB     In Chair notified nsg;reclined;call light within reach;encouraged to call for assist;exit alarm on  -DB               User Key  (r) = Recorded By, (t) = Taken By, (c) = Cosigned By      Initials Name Provider Type    Michael Latham OT Occupational Therapist                   Outcome Measures       Row Name 07/22/24 1622          How much help from another is currently needed...    Putting on and taking off regular lower body clothing? 2  -DB     Bathing (including washing, rinsing, and drying) 2  -DB     Toileting (which includes using toilet bed pan or urinal) 2  -DB     Putting on and taking off regular upper body clothing 2  -DB     Taking care of personal grooming (such as brushing teeth) 2  -DB     Eating meals 3  -DB     AM-PAC 6 Clicks Score (OT) 13  -DB       Row Name 07/22/24 1535 07/22/24 0800       How much help from another person do you currently need...    Turning from your back to your side while in flat bed without using bedrails? 3  -RM 3  -HN    Moving from lying on back to sitting on the side of a flat bed without bedrails? 3  -RM 2  -HN    Moving to and from a bed to a chair (including a wheelchair)? 3  -RM 3  -HN    Standing up from a chair using your arms (e.g., wheelchair, bedside chair)? 3  -RM 3  -HN    Climbing 3-5 steps with a railing? 1  -RM 1  -HN    To walk in hospital room? 1  -RM 1  -HN    AM-PAC 6 Clicks Score (PT) 14  -RM 13  -HN    Highest Level of Mobility Goal 4 -->  Transfer to chair/commode  -RM 4 --> Transfer to chair/commode  -HN      Row Name 07/22/24 1622 07/22/24 1535       Functional Assessment    Outcome Measure Options AM-PAC 6 Clicks Daily Activity (OT)  -DB AM-PAC 6 Clicks Basic Mobility (PT)  -RM              User Key  (r) = Recorded By, (t) = Taken By, (c) = Cosigned By      Initials Name Provider Type    RM Eliceo Matos, PTA Physical Therapist Assistant    Krystal Son, RN Registered Nurse    Michael Latham, OT Occupational Therapist                    Occupational Therapy Education       Title: PT OT SLP Therapies (In Progress)       Topic: Occupational Therapy (In Progress)       Point: ADL training (Done)       Description:   Instruct learner(s) on proper safety adaptation and remediation techniques during self care or transfers.   Instruct in proper use of assistive devices.                  Learning Progress Summary             Patient Acceptance, E,TB, VU,NR by DB at 7/22/2024 1623    Comment: Pt educated on ADL retraining. Pt will require further education for follow through.                         Point: Home exercise program (Not Started)       Description:   Instruct learner(s) on appropriate technique for monitoring, assisting and/or progressing therapeutic exercises/activities.                  Learner Progress:  Not documented in this visit.              Point: Precautions (Not Started)       Description:   Instruct learner(s) on prescribed precautions during self-care and functional transfers.                  Learner Progress:  Not documented in this visit.              Point: Body mechanics (Not Started)       Description:   Instruct learner(s) on proper positioning and spine alignment during self-care, functional mobility activities and/or exercises.                  Learner Progress:  Not documented in this visit.                              User Key       Initials Effective Dates Name Provider Type Discipline    DB 07/06/23  "-  Michael Schofield, OT Occupational Therapist OT                  OT Recommendation and Plan  Planned Therapy Interventions (OT): activity tolerance training, adaptive equipment training, BADL retraining, functional balance retraining, occupation/activity based interventions, ROM/therapeutic exercise, strengthening exercise, transfer/mobility retraining, neuromuscular control/coordination retraining, passive ROM/stretching, patient/caregiver education/training  Therapy Frequency (OT): 5 times/wk  Plan of Care Review  Plan of Care Reviewed With: patient  Progress: no change  Outcome Evaluation: OT eval completed. Pt able to nod 'yes\" when asked if she was doing ok this date. Pt able to complete (L) <> (R) bed mobility with min A to complete brief change. Pt is max A for brief change and perineal hygiene. Pt able to complete supine to sit EOB with min A. Pt requires max A to don (B) socks. Pt able to complete STS with min A and lourdes walker. Pt able to complete stand pivot transfer bed to chair with min A and lourdes walker. Pt able to perform grooming routine of face washing and combing hair with set up and demonstration. Pt RUE is limited in ROM and noted to have increased spasticity. (R) hand flexed. OT placed rolled up wash cloth in hand for contracture mgmt. OT educated pt to remove wash cloth if it became painful. Pt able to nod head yes to verbalize understanding. Pt has potential to be more (I) with therapy when OT goals are addressed. Pt to benefit from skilled OT services to address ROM, strength, endurance, transfers, and ADL mgmt. Pt to benefit from d/c to STR/LTC once medically appropriate.     Time Calculation:   Evaluation Complexity (OT)  Review Occupational Profile/Medical/Therapy History Complexity: expanded/moderate complexity  Assessment, Occupational Performance/Identification of Deficit Complexity: 3-5 performance deficits  Clinical Decision Making Complexity (OT): detailed assessment/moderate " "complexity  Overall Complexity of Evaluation (OT): moderate complexity     Time Calculation- OT       Row Name 07/22/24 1623             Time Calculation- OT    OT Start Time 1337  -DB      OT Received On 07/22/24  -DB      OT Goal Re-Cert Due Date 08/01/24  -DB         Untimed Charges    OT Eval/Re-eval Minutes 40  -DB         Total Minutes    Untimed Charges Total Minutes 40  -DB       Total Minutes 40  -DB                User Key  (r) = Recorded By, (t) = Taken By, (c) = Cosigned By      Initials Name Provider Type    DB Michael Schofield OT Occupational Therapist                  Therapy Charges for Today       Code Description Service Date Service Provider Modifiers Qty    22904678177 HC OT EVAL MOD COMPLEXITY 3 7/22/2024 Michael Schofield OT GO 1                 Michael Schofield OT  7/22/2024    Electronically signed by Michael Schofield OT at 07/22/24 1624       Michael Schofield OT at 07/22/24 1623          Goal Outcome Evaluation:  Plan of Care Reviewed With: patient        Progress: no change  Outcome Evaluation: OT eval completed. Pt able to nod 'yes\" when asked if she was doing ok this date. Pt able to complete (L) <> (R) bed mobility with min A to complete brief change. Pt is max A for brief change and perineal hygiene. Pt able to complete supine to sit EOB with min A. Pt requires max A to don (B) socks. Pt able to complete STS with min A and lourdes walker. Pt able to complete stand pivot transfer bed to chair with min A and lourdes walker. Pt able to perform grooming routine of face washing and combing hair with set up and demonstration. Pt RUE is limited in ROM and noted to have increased spasticity. (R) hand flexed. OT placed rolled up wash cloth in hand for contracture mgmt. OT educated pt to remove wash cloth if it became painful. Pt able to nod head yes to verbalize understanding. Pt has potential to be more (I) with therapy when OT goals are addressed. Pt to benefit from " skilled OT services to address ROM, strength, endurance, transfers, and ADL mgmt. Pt to benefit from d/c to STR/LTC once medically appropriate.      Anticipated Discharge Disposition (OT): inpatient rehabilitation facility, [facility] ([facility])                          Electronically signed by Michael Schofield OT at 07/22/24 9725

## 2024-07-25 PROCEDURE — 99232 SBSQ HOSP IP/OBS MODERATE 35: CPT | Performed by: FAMILY MEDICINE

## 2024-07-25 PROCEDURE — 94799 UNLISTED PULMONARY SVC/PX: CPT

## 2024-07-25 PROCEDURE — 94664 DEMO&/EVAL PT USE INHALER: CPT

## 2024-07-25 PROCEDURE — 25010000002 ENOXAPARIN PER 10 MG: Performed by: INTERNAL MEDICINE

## 2024-07-25 PROCEDURE — 92522 EVALUATE SPEECH PRODUCTION: CPT

## 2024-07-25 PROCEDURE — 96372 THER/PROPH/DIAG INJ SC/IM: CPT

## 2024-07-25 PROCEDURE — 94761 N-INVAS EAR/PLS OXIMETRY MLT: CPT

## 2024-07-25 PROCEDURE — G0378 HOSPITAL OBSERVATION PER HR: HCPCS

## 2024-07-25 PROCEDURE — 97535 SELF CARE MNGMENT TRAINING: CPT

## 2024-07-25 RX ORDER — BUTALBITAL, ACETAMINOPHEN AND CAFFEINE 50; 325; 40 MG/1; MG/1; MG/1
1 TABLET ORAL EVERY 4 HOURS PRN
Status: DISCONTINUED | OUTPATIENT
Start: 2024-07-25 | End: 2024-07-26 | Stop reason: HOSPADM

## 2024-07-25 RX ORDER — IPRATROPIUM BROMIDE AND ALBUTEROL SULFATE 2.5; .5 MG/3ML; MG/3ML
3 SOLUTION RESPIRATORY (INHALATION)
Status: DISCONTINUED | OUTPATIENT
Start: 2024-07-25 | End: 2024-07-26 | Stop reason: HOSPADM

## 2024-07-25 RX ORDER — ENOXAPARIN SODIUM 100 MG/ML
40 INJECTION SUBCUTANEOUS EVERY 12 HOURS
Status: DISCONTINUED | OUTPATIENT
Start: 2024-07-25 | End: 2024-07-26 | Stop reason: HOSPADM

## 2024-07-25 RX ADMIN — ASPIRIN 81 MG CHEWABLE TABLET 81 MG: 81 TABLET CHEWABLE at 11:21

## 2024-07-25 RX ADMIN — HYDROCODONE BITARTRATE AND ACETAMINOPHEN 1 TABLET: 7.5; 325 TABLET ORAL at 00:55

## 2024-07-25 RX ADMIN — BUDESONIDE 0.5 MG: 0.5 INHALANT RESPIRATORY (INHALATION) at 06:54

## 2024-07-25 RX ADMIN — SENNOSIDES AND DOCUSATE SODIUM 2 TABLET: 50; 8.6 TABLET ORAL at 21:54

## 2024-07-25 RX ADMIN — PREGABALIN 300 MG: 75 CAPSULE ORAL at 11:22

## 2024-07-25 RX ADMIN — BUTALBITAL, ACETAMINOPHEN, AND CAFFEINE 1 TABLET: 50; 325; 40 TABLET ORAL at 15:51

## 2024-07-25 RX ADMIN — BUDESONIDE 0.5 MG: 0.5 INHALANT RESPIRATORY (INHALATION) at 19:56

## 2024-07-25 RX ADMIN — DIVALPROEX SODIUM 500 MG: 500 TABLET, DELAYED RELEASE ORAL at 22:34

## 2024-07-25 RX ADMIN — FAMOTIDINE 20 MG: 20 TABLET, FILM COATED ORAL at 11:21

## 2024-07-25 RX ADMIN — ENOXAPARIN SODIUM 40 MG: 100 INJECTION SUBCUTANEOUS at 06:03

## 2024-07-25 RX ADMIN — IPRATROPIUM BROMIDE AND ALBUTEROL SULFATE 3 ML: .5; 3 SOLUTION RESPIRATORY (INHALATION) at 06:54

## 2024-07-25 RX ADMIN — CITALOPRAM HYDROBROMIDE 10 MG: 20 TABLET ORAL at 11:23

## 2024-07-25 RX ADMIN — LEVETIRACETAM 500 MG: 500 TABLET, FILM COATED ORAL at 11:23

## 2024-07-25 RX ADMIN — IPRATROPIUM BROMIDE AND ALBUTEROL SULFATE 3 ML: .5; 3 SOLUTION RESPIRATORY (INHALATION) at 00:33

## 2024-07-25 RX ADMIN — Medication 10 ML: at 11:24

## 2024-07-25 RX ADMIN — DIVALPROEX SODIUM 500 MG: 500 TABLET, DELAYED RELEASE ORAL at 11:23

## 2024-07-25 RX ADMIN — DANTROLENE SODIUM 25 MG: 25 CAPSULE ORAL at 21:54

## 2024-07-25 RX ADMIN — HYDROCODONE BITARTRATE AND ACETAMINOPHEN 1 TABLET: 7.5; 325 TABLET ORAL at 10:38

## 2024-07-25 RX ADMIN — ENOXAPARIN SODIUM 40 MG: 100 INJECTION SUBCUTANEOUS at 21:54

## 2024-07-25 RX ADMIN — LOSARTAN POTASSIUM 50 MG: 50 TABLET, FILM COATED ORAL at 11:23

## 2024-07-25 RX ADMIN — PANTOPRAZOLE SODIUM 40 MG: 40 TABLET, DELAYED RELEASE ORAL at 11:22

## 2024-07-25 RX ADMIN — DANTROLENE SODIUM 25 MG: 25 CAPSULE ORAL at 11:22

## 2024-07-25 RX ADMIN — METOPROLOL SUCCINATE 75 MG: 25 TABLET, EXTENDED RELEASE ORAL at 11:22

## 2024-07-25 RX ADMIN — IPRATROPIUM BROMIDE AND ALBUTEROL SULFATE 3 ML: .5; 3 SOLUTION RESPIRATORY (INHALATION) at 19:56

## 2024-07-25 RX ADMIN — SENNOSIDES AND DOCUSATE SODIUM 2 TABLET: 50; 8.6 TABLET ORAL at 11:22

## 2024-07-25 RX ADMIN — NICOTINE 1 PATCH: 21 PATCH TRANSDERMAL at 11:27

## 2024-07-25 RX ADMIN — PREGABALIN 300 MG: 75 CAPSULE ORAL at 22:34

## 2024-07-25 RX ADMIN — ACETAMINOPHEN 650 MG: 325 TABLET, FILM COATED ORAL at 14:18

## 2024-07-25 RX ADMIN — LEVETIRACETAM 500 MG: 500 TABLET, FILM COATED ORAL at 22:34

## 2024-07-25 RX ADMIN — HYDROCODONE BITARTRATE AND ACETAMINOPHEN 1 TABLET: 7.5; 325 TABLET ORAL at 18:42

## 2024-07-25 RX ADMIN — Medication 10 ML: at 21:54

## 2024-07-25 NOTE — PROGRESS NOTES
"Pharmacy Consult - Enoxaparin Dosing  Roxi Valerio is a 49 y.o. female who has been consulted to dose enoxaparin for VTE prophylaxis.     Allergies  Codeine and Penicillins    Relevant clinical data and objective history reviewed:   [Ht: 154.9 cm (61\"); Wt: 96.1 kg (211 lb 13.8 oz)]  Body mass index is 40.03 kg/m².  Estimated Creatinine Clearance: 110.2 mL/min (by C-G formula based on SCr of 0.64 mg/dL).  Results from last 7 days   Lab Units 07/22/24  0629 07/21/24  0643 07/20/24  2304   HEMOGLOBIN g/dL 13.9 14.0 14.3   HEMATOCRIT % 42.3 41.9 42.2   PLATELETS 10*3/mm3 225 227 219   CREATININE mg/dL 0.64 0.67 0.72       Asessment/Plan  Initiate enoxaparin 40 mg SQ every 12 hours for BMI 40-50.  Pharmacy will monitor Ms. Valerio's renal function and clinical status and adjust the enoxaparin dose and/or frequency as needed.    Thank you for the consult,     Harman Ontiveros, PharmD, BCPS   7/25/2024  12:04 EDT  "

## 2024-07-25 NOTE — SIGNIFICANT NOTE
07/25/24 1514   Communication Assessment/Intervention   Document Type evaluation   Total Evaluation Minutes, SLP 30   Subjective Information complains of;pain  (c/o headache on Left, which staff member reports as chronic.)   Patient Observations cooperative;agree to therapy;alert   Patient/Family/Caregiver Comments/Observations family not present   Comment Pt w/ L head pain, per her report via gestural language/indication.   General Information   Patient Profile Reviewed yes   Pertinent History Of Current Problem   (49-year-old with history of prior TBI, large left ICH, right hemiparesis, aphasia, hydrocephalus with a right frontal VPS, hypertension, COPD, who presented to the emergency room with weakness, inability to provide her care at home.)   Precautions/Limitations, Vision difficult to assess   Precautions/Limitations, Hearing WFL  (at the conversational level.)   Prior Level of Function-Communication receptive language impairment;expressive language impairment;motor speech impairment;cognitive-linguistic impairment   Plans/Goals Discussed with patient   Barriers to Rehab medically complex;cognitive status;language barrier;family issues   Patient's Goals for Discharge return to all previous roles/activities;functional communication   Family Goals for Discharge family did not state   Standardized Assessment Used other (see comments)  (non standardized assessment used to evaluate: oral mechanism, motor speech, expressive and receptive language, written language ID)   Pain Scale: Numbers Pre/Post-Treatment   Pretreatment Pain Rating 6/10   Pain Intervention(s) Repositioned;Other (Comment)  (notified nsg, pt had been given medication for pain about 30 min prior)   Pain Scale: FACES Pre/Post-Treatment   Pain: FACES Scale, Pretreatment 6-->hurts even more   Posttreatment Pain Rating 6-->hurts even more   Pain Location - head   Comprehension Assessment/Intervention   Comprehension Assessment/Intervention Reading  Comprehension;Auditory Comprehension   Auditory Comprehension Assessment/Intervention   Auditory Comprehension (Communication) moderate impairment;severe impairment   Able to Identify Objects/Pictures (Communication) pictures of common objects;mild impairment;moderate impairment  (80% accuracy with identification of commob objects f x4.)   Answers Questions (Communication) yes/no;personal;simple;concrete;moderate impairment;severe impairment   Able to Follow Commands (Communication) 1-step;2-step  (1 step commands with 90% accuracy, 2 step commands with 70% accuracy)   Narrative Discourse moderate impairment;severe impairment;conversational level   Successful Auditory Strategies (Communication) repetition;visual cues;decrease environmental distractions   Reading Comprehension Assessment/Intervention   Reading Comprehension (Communication) moderate impairment;severe impairment   Scanning (Reading) words;mild impairment;moderate impairment  (field x 4 (yes, no, not sure, and later all with accompanying signs/objects).)   Visual Matching Ability (Reading) object/word;mild impairment  (80%)   Single Word Level mild impairment  (80% accuracy fx 4.)   Phrase Level unable/difficult to assess   Paragraph Level unable/difficult to assess  (DNT)   Functional Reading Tasks other (see comments)  (ARTIS at the paragraph level)   Reading Comprehension, Comment 80% with common words with accompanying signs, w/ field x 4.   Oral Motor Structure and Function   Oral Motor Structure and Function WFL   Dentition Assessment teeth are in poor condition;missing teeth   Mucosal Quality ulcerated;cracked;dry   Oral Musculature and Cranial Nerve Assessment   Oral Motor General Assessment generalized oral motor weakness   Lingual Impairment, Detail. Cranial Nerves IX, XII (Glossopharyngeal and Hypoglossal) reduced strength left;reduced lingual ROM;reduced strength   Oral Motor, Comment reduced labial and lingual strength and coordination.    Augmentative/Alternative Communication   Gestures (Alternative Communication) facial affect;head nod;gestures with objects;spontaneously gestures wants/needs   Gestures, Comment Thumbs up/down   Alphabet Board (Alternative Communication Difficulty with alphabet identification - too overstimulated.   Communication Board (Alternative Communication) picture;word   Alternative Communication, Comment AAC board (fx4) w/ ~80% accuracy   Communication Treatment Objective and Progress Goals (SLP)   Augmentative/Alternative Communication Objectives Augmentative/Alternative Communication Objectives (Group)  (Pt will use AAC board (f x4) w/ familiar and unfamiliar listeners.)   SLP Discharge Summary   Discharge Destination SNF   Progress Toward Achieving Short/long Term Goals goals partially met within established timelines     Pt provided with basic communication board with pic/word combinations (field x 4) in which she was able to identify and use w/ 80% accuracy. Melodic Intonation therapy attempted with 2 familiar songs, pt ~60% accurate with words and varying syllable patterns.

## 2024-07-25 NOTE — PLAN OF CARE
Goal Outcome Evaluation:  Plan of Care Reviewed With: patient        Progress: improving  Outcome Evaluation: Pt received supine in bed, expressed she wasn't feeling well.  Pt c/o headache.  Pt also gestured she was wet so she was assisted in getting her brief changed.  Pt was able to roll and bridge to get brief off and new brief on.  Pt was able to perform perineal hygiene with set up.  Pt sat eob with supervision, stood with cga/min assist and lourdes walker.  Pt able to transfer bed to chair with cga/min assist.  Pt given mouthwash to rinse her mouth.  Pt was left sitting up in her chair with speech present.  Cont OT per POC.

## 2024-07-25 NOTE — THERAPY TREATMENT NOTE
Patient Name: Roxi Valerio  : 1975    MRN: [id number]                              Today's Date: 2024       Admit Date: 2024    Visit Dx:     ICD-10-CM ICD-9-CM   1. Unable to care for self  Z78.9 V49.89     Patient Active Problem List   Diagnosis    Influenza A    Unable to care for self    Hypertensive urgency     Past Medical History:   Diagnosis Date    COPD (chronic obstructive pulmonary disease)     Hypertension     Paralysis due to old stroke     right sided    Stroke     TBI (traumatic brain injury)      Past Surgical History:   Procedure Laterality Date    BRAIN SURGERY      TUBAL ABDOMINAL LIGATION        General Information       Row Name 24 1512          OT Time and Intention    Document Type therapy note (daily note)  -     Mode of Treatment occupational therapy  -       Row Name 24 151          General Information    Patient Profile Reviewed yes  -     Existing Precautions/Restrictions fall;seizures  -               User Key  (r) = Recorded By, (t) = Taken By, (c) = Cosigned By      Initials Name Provider Type     Osiris Donnelly Occupational Therapist                     Mobility/ADL's       Row Name 24 151          Bed Mobility    Bed Mobility supine-sit  -     Supine-Sit [place] (Bed Mobility) supervision;verbal cues  -     Assistive Device (Bed Mobility) head of bed elevated;bed rails  -       Row Name 24 151          Bed-Chair Transfer    Bed-Chair [place] (Transfers) contact guard;minimum assist (75% patient effort)  -     Assistive Device (Bed-Chair Transfers) walker, lourdes  -       Row Name 24 151          Sit-Stand Transfer    Sit-Stand [place] (Transfers) contact guard  -     Assistive Device (Sit-Stand Transfers) walker, lourdes  -       Row Name 24 151          Grooming Assessment/Training    Brewster Level (Grooming) set up  -     Oral Care mouth wash rinse  -       Row Name 24 151           Toileting Assessment/Training    Lyon Level (Toileting) change pad/brief;moderate assist (50% patient effort);perform perineal hygiene;minimum assist (75% patient effort)  -               User Key  (r) = Recorded By, (t) = Taken By, (c) = Cosigned By      Initials Name Provider Type    Osiris Marte Occupational Therapist                   Obj/Interventions    No documentation.                  Goals/Plan    No documentation.                  Clinical Impression       [facility] Name 07/25/24 1514          Pain Assessment    Pain Intervention(s) Repositioned;Other (Comment)  notified nsg, pt had been given medication for pain about 30 min prior  -       Row Name 07/25/24 1514          Pain Scale: FACES Pre/Post-Treatment    Pain: FACES Scale, Pretreatment 6-->hurts even more  -     Posttreatment Pain Rating 6-->hurts even more  -     Pain Location - head  -[facility] Name 07/25/24 1514          Plan of Care Review    Plan of Care Reviewed With patient  -     Progress improving  -     Outcome Evaluation Pt received supine in bed, expressed she wasn't feeling well.  Pt c/o headache.  Pt also gestured she was wet so she was assisted in getting her brief changed.  Pt was able to roll and bridge to get brief off and new brief on.  Pt was able to perform perineal hygiene with set up.  Pt sat eob with supervision, stood with cga/min assist and lourdes walker.  Pt able to transfer bed to chair with cga/min assist.  Pt given mouthwash to rinse her mouth.  Pt was left sitting up in her chair with speech present.  Cont OT per POC.  -[facility] Name 07/25/24 1514          Positioning and Restraints    Pre-Treatment Position in bed  -     Post Treatment Position chair  -     In Chair sitting;call light within reach;encouraged to call for assist;with SLP  -               User Key  (r) = Recorded By, (t) = Taken By, (c) = Cosigned By      Initials Name Provider Type    Osiris Marte  Occupational Therapist                   Outcome Measures       Row Name 07/25/24 1526          How much help from another is currently needed...    Putting on and taking off regular lower body clothing? 3  -AH     Bathing (including washing, rinsing, and drying) 2  -AH     Toileting (which includes using toilet bed pan or urinal) 2  -AH     Putting on and taking off regular upper body clothing 2  -AH     Taking care of personal grooming (such as brushing teeth) 2  -AH     Eating meals 3  -AH     AM-PAC 6 Clicks Score (OT) 14  -       Row Name 07/25/24 1526          Functional Assessment    Outcome Measure Options AM-PAC 6 Clicks Daily Activity (OT)  -               User Key  (r) = Recorded By, (t) = Taken By, (c) = Cosigned By      Initials Name Provider Type    Osiris Marte Occupational Therapist                    Occupational Therapy Education       Title: PT OT SLP Therapies (In Progress)       Topic: Occupational Therapy (In Progress)       Point: ADL training (Done)       Description:   Instruct learner(s) on proper safety adaptation and remediation techniques during self care or transfers.   Instruct in proper use of assistive devices.                  Learning Progress Summary             Patient Acceptance, E,TB, VU by  at 7/25/2024 1530    Comment: benefit of therapy to improve functional strength    Acceptance, E,TB, VU by  at 7/23/2024 1627    Comment: benefit of working with therapy    Acceptance, E,TB, VU,NR by  at 7/22/2024 1623    Comment: Pt educated on ADL retraining. Pt will require further education for follow through.                         Point: Home exercise program (Done)       Description:   Instruct learner(s) on appropriate technique for monitoring, assisting and/or progressing therapeutic exercises/activities.                  Learning Progress Summary             Patient Acceptance, E,TB, VU by  at 7/23/2024 1627    Comment: benefit of working with therapy                          Point: Precautions (Not Started)       Description:   Instruct learner(s) on prescribed precautions during self-care and functional transfers.                  Learner Progress:  Not documented in this visit.              Point: Body mechanics (Not Started)       Description:   Instruct learner(s) on proper positioning and spine alignment during self-care, functional mobility activities and/or exercises.                  Learner Progress:  Not documented in this visit.                              User Key       Initials Effective Dates Name Provider Type Discipline     06/16/21 -  Osiris Donnelly Occupational Therapist OT     07/06/23 -  Michael Schofield OT Occupational Therapist OT                  OT Recommendation and Plan     Plan of Care Review  Plan of Care Reviewed With: patient  Progress: improving  Outcome Evaluation: Pt received supine in bed, expressed she wasn't feeling well.  Pt c/o headache.  Pt also gestured she was wet so she was assisted in getting her brief changed.  Pt was able to roll and bridge to get brief off and new brief on.  Pt was able to perform perineal hygiene with set up.  Pt sat eob with supervision, stood with cga/min assist and lourdes walker.  Pt able to transfer bed to chair with cga/min assist.  Pt given mouthwash to rinse her mouth.  Pt was left sitting up in her chair with speech present.  Cont OT per POC.     Time Calculation:         Time Calculation- OT       Row Name 07/25/24 1534             Time Calculation-     OT Start Time 1438  -      OT Stop Time 1451  -      OT Time Calculation (min) 13 min  -      OT Received On 07/25/24  -      OT Goal Re-Cert Due Date 08/01/24  -         Timed Charges    61247 - OT Therapeutic Activity Minutes 5  -      50634 - OT Self Care/Mgmt Minutes 8  -         Total Minutes    Timed Charges Total Minutes 13  -       Total Minutes 13  -                User Key  (r) = Recorded By, (t) = Taken By, (c) =  Cosigned By      Initials Name Provider Type    Osiris Marte Occupational Therapist                  Therapy Charges for Today       Code Description Service Date Service Provider Modifiers Qty    89024941245 HC OT SELF CARE/MGMT/TRAIN EA 15 MIN 7/25/2024 Osiris Donnelly GO 1                 Osiris Donnelly  7/25/2024

## 2024-07-25 NOTE — PROGRESS NOTES
[facility]    Name:  Roxi Valerio   Age:  49 y.o.  Sex:  female  :  1975  MRN:  [id number]   Visit Number:  [id number]  Admission Date:  2024  Date Of Service:  24  Primary Care Physician:  System, Provider Not In     LOS: 0 days :    Chief Complaint:      Weakness, failure to thrive    Subjective:    Patient awake and alert and able to follow commands.  She continues AFBs are primarily yes to all my questions.    Hospital Course:    49-year-old with history of prior TBI, large left ICH, right hemiparesis, aphasia, hydrocephalus with a right frontal VPS, hypertension, COPD, who presented to the emergency room with weakness, inability to provide her care at home.    Workup in the emergency room the patient was hypertensive.  She had possible UTI.  CT scan of the head was unremarkable.  She was admitted to the hospital service.    Review of Systems:     All systems were reviewed and negative except as mentioned in subjective, assessment and plan.    Vital Signs:    Temp:  [97.7 °F (36.5 °C)-98.2 °F (36.8 °C)] 98.1 °F (36.7 °C)  Heart Rate:  [75-97] 97  Resp:  [18] 18  BP: (122-174)/(76-99) 127/76    Intake and output:    I/O last 3 completed shifts:  In: 470 [P.O.:470]  Out: -   I/O this shift:  In: 360 [P.O.:360]  Out: -     Physical Examination:    General Appearance:  Alert and cooperative.  No acute distress   Head:  Atraumatic and normocephalic.  Scar noted   Eyes: Conjunctivae and sclerae normal, no icterus. No pallor.   Throat: No oral lesions, no thrush, oral mucosa moist.   Neck: Supple, trachea midline, no thyromegaly.   Lungs:   Breath sounds heard bilaterally equally.  No wheezing or crackles. No Pleural rub or bronchial breathing.   Heart:  Normal S1 and S2, no murmur, no gallop, no rub. No JVD.   Abdomen:   Normal bowel sounds, no masses, no organomegaly. Soft, nontender, nondistended, no rebound tenderness.   Extremities: Supple, no  edema, no cyanosis, no clubbing.   Skin: No bleeding or rash.   Neurologic: Alert and arousable.  Repeatedly answers yes and no to all questions.  Hemiplegia noted on the right.  No significant change in mental status     Laboratory results:    Results from last 7 days   Lab Units 07/22/24  1714 07/22/24  0629 07/21/24  2320 07/21/24  0643 07/20/24  2304   SODIUM mmol/L  --  146*  --  145 144   POTASSIUM mmol/L 4.0 3.6 3.8 2.6* 4.6   CHLORIDE mmol/L  --  109*  --  109* 108*   CO2 mmol/L  --  19.9*  --  23.1 23.2   BUN mg/dL  --  14  --  11 12   CREATININE mg/dL  --  0.64  --  0.67 0.72   CALCIUM mg/dL  --  9.1  --  9.0 9.0   BILIRUBIN mg/dL  --   --   --   --  0.3   ALK PHOS U/L  --   --   --   --  103   ALT (SGPT) U/L  --   --   --   --  14   AST (SGOT) U/L  --   --   --   --  17   GLUCOSE mg/dL  --  93  --  89 91     Results from last 7 days   Lab Units 07/22/24  0629 07/21/24  0643 07/20/24  2304   WBC 10*3/mm3 6.09 6.97 7.75   HEMOGLOBIN g/dL 13.9 14.0 14.3   HEMATOCRIT % 42.3 41.9 42.2   PLATELETS 10*3/mm3 225 227 219                 Recent Labs     10/29/23  2343   PHART 7.400   DPU8BVW 34.1*   PO2ART 70.4*   IXA5EXH 21.1*   BASEEXCESS -2.9*      I have reviewed the patient's laboratory results.    Radiology results:    No radiology results from the last 24 hrs  I have reviewed the patient's radiology reports.    Medication Review:     I have reviewed the patient's active and prn medications.     Problem List:      Hypertensive urgency    Unable to care for self      Assessment:    Hypertensive urgency, POA.  Inability to care for self.  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  Essential hypertension.  COPD.  Obesity with a BMI of 39.    Plan:    Hypertensive urgency.  Continue to make adjustments, currently on losartan, Toprol-XL.  Appears stable     Traumatic brain injury and inability to care for self.  Has been seen by PT and OT.  Recommendations for placement.  Per case management can  likely go to to [place] tomorrow.     COPD.  Continue with bronchodilators    DVT Prophylaxis: Enoxaparin  Code Status: Full  Diet: Cardiac  Discharge Plan: Medically appropriate for discharge to rehab, hopefully tomorrow to [place]    Katty Garcia DO  07/25/24  11:36 EDT    Dictated utilizing Dragon dictation.

## 2024-07-25 NOTE — CASE MANAGEMENT/SOCIAL WORK
Case Management/Social Work    Patient Name:  Roxi Valerio  YOB: 1975  MRN: [id number]  Admit Date:  7/20/2024    Aram spoke with [facility]. States they did receive the referral yesterday. However, [place] was in a meeting all day and did not have an opportunity to follow up on the referral. Plans to ask about pt admitting, during morning meeting. Will contact this Sw once a decision has been made.      10:17 EDT  Aram spoke to [facility] regarding pt referral. States pt has been accepted and can admit to the facility tomorrow afternoon.  Pt room will not be ready until after lunch. CLARENCE and Dr. Garcia updated via secure chat.      Electronically signed by:  KIERRA Broussard  07/25/24 08:48 EDT

## 2024-07-25 NOTE — CASE MANAGEMENT/SOCIAL WORK
Continued Stay Note  LAVELL Pandey     Patient Name: Roxi Valerio  MRN: [id number]  Today's Date: 7/25/2024    Admit Date: 7/20/2024    Plan: pt resting in chair reading this am; no family at bedside   Discharge Plan       Row Name 07/25/24 1027       Plan    Plan pt resting in bed this this am, unable to answer questions appropriately; no family at bedside  10:37 EDT  [facility] accepted; attempted to call spouse to inform, unable to lm  13:49 EDT  Spoke with pt in room and informed that she was accepted at [facility] for tomorrow afternoon if ready to d/c at that time  14:00 EDT  Spouse in room, informed of approval for d/c tomorrow afternoon if ready to [facility]; very appreciative and stated she had been there before and has family that works there; informed spouse that he could go in am to sign papers; stated he would then                   Discharge Codes    No documentation.                       Yulissa Vela RN

## 2024-07-25 NOTE — PLAN OF CARE
Goal Outcome Evaluation:           Progress: improving  Outcome Evaluation: MD order received. Speech/language Evaluation completed. Pt presents with mode-severe receptive and expressive language impairment and errors characteristic of motor speech impairment.Pt successful with modified AAC board (field x 4 including basic words and corresponding symbols/pics), 80% accuracy in 3/3 trials. Pt also successful with melodic intonation therapy (singing familiar songs to fluently move across syllable shapes and patterns) Staff notified and educated.     Patricia Rodriguez, SLP

## 2024-07-25 NOTE — THERAPY EVALUATION
Acute Care - Speech Language Pathology Initial Evaluation  [facility]     Patient Name: Roxi Valerio  : 1975  MRN: [id number]  Today's Date: 2024               Admit Date: 2024     Visit Dx:    ICD-10-CM ICD-9-CM   1. Unable to care for self  Z78.9 V49.89     Patient Active Problem List   Diagnosis    Influenza A    Unable to care for self    Hypertensive urgency     Past Medical History:   Diagnosis Date    COPD (chronic obstructive pulmonary disease)     Hypertension     Paralysis due to old stroke     right sided    Stroke     TBI (traumatic brain injury)      Past Surgical History:   Procedure Laterality Date    BRAIN SURGERY      TUBAL ABDOMINAL LIGATION         SLP Recommendation and Plan    49-year-old with history of prior TBI, large left ICH, right hemiparesis, aphasia, hydrocephalus with a right frontal VPS, hypertension, COPD, who presented to the emergency room with weakness, inability to provide her care at home.       Progress: improving (24 1620)  Outcome Evaluation: MD order received. Speech/language Evaluation completed. Pt presents with mode-severe receptive and expressive language impairment and errors characteristic of motor speech impairment. (24 1620)           EDUCATION  The patient has been educated in the following areas:     Communication Impairment. Mod-Severe Expressive/Receptive language impairment, mixed/global aphasia, motor speech impairment.        Time Calculation:      Time Calculation- SLP       Row Name 24 1631 24 1619          Time Calculation- SLP    SLP Start Time 1430  -CN 1430  -CN     SLP Stop Time 1500  -CN 1500  -CN     SLP Time Calculation (min) 30 min  -CN 30 min  -CN               User Key  (r) = Recorded By, (t) = Taken By, (c) = Cosigned By      Initials Name Provider Type    Patricia Bae, SLP Speech and Language Pathologist                    Therapy Charges for Today       Code Description Service Date Service Provider  Modifiers Qty    [id number] [facility] SPEECH SOUND PRODUCTION 1 7/25/2024 Patricia Rodriguez, SLP GN 1                       NIKOLAI Shepherd  7/25/2024

## 2024-07-26 VITALS
TEMPERATURE: 97.5 F | DIASTOLIC BLOOD PRESSURE: 90 MMHG | BODY MASS INDEX: 40.46 KG/M2 | RESPIRATION RATE: 18 BRPM | SYSTOLIC BLOOD PRESSURE: 135 MMHG | HEART RATE: 99 BPM | HEIGHT: 61 IN | WEIGHT: 214.29 LBS | OXYGEN SATURATION: 91 %

## 2024-07-26 PROCEDURE — 25010000002 ENOXAPARIN PER 10 MG: Performed by: INTERNAL MEDICINE

## 2024-07-26 PROCEDURE — 99238 HOSP IP/OBS DSCHRG MGMT 30/<: CPT | Performed by: FAMILY MEDICINE

## 2024-07-26 PROCEDURE — 94799 UNLISTED PULMONARY SVC/PX: CPT

## 2024-07-26 PROCEDURE — 94761 N-INVAS EAR/PLS OXIMETRY MLT: CPT

## 2024-07-26 PROCEDURE — G0378 HOSPITAL OBSERVATION PER HR: HCPCS

## 2024-07-26 PROCEDURE — 96372 THER/PROPH/DIAG INJ SC/IM: CPT

## 2024-07-26 RX ORDER — HYDROCODONE BITARTRATE AND ACETAMINOPHEN 7.5; 325 MG/1; MG/1
1 TABLET ORAL EVERY 8 HOURS PRN
Qty: 9 TABLET | Refills: 0 | Status: SHIPPED | OUTPATIENT
Start: 2024-07-26 | End: 2024-07-29

## 2024-07-26 RX ORDER — METOPROLOL SUCCINATE 25 MG/1
75 TABLET, EXTENDED RELEASE ORAL DAILY
Start: 2024-07-27

## 2024-07-26 RX ORDER — NICOTINE 21 MG/24HR
1 PATCH, TRANSDERMAL 24 HOURS TRANSDERMAL
Start: 2024-07-27

## 2024-07-26 RX ORDER — BUTALBITAL, ACETAMINOPHEN AND CAFFEINE 50; 325; 40 MG/1; MG/1; MG/1
1 TABLET ORAL 2 TIMES DAILY
Qty: 6 TABLET | Refills: 0 | Status: SHIPPED | OUTPATIENT
Start: 2024-07-26 | End: 2024-07-29

## 2024-07-26 RX ADMIN — FAMOTIDINE 20 MG: 20 TABLET, FILM COATED ORAL at 09:55

## 2024-07-26 RX ADMIN — SENNOSIDES AND DOCUSATE SODIUM 2 TABLET: 50; 8.6 TABLET ORAL at 09:55

## 2024-07-26 RX ADMIN — BUTALBITAL, ACETAMINOPHEN, AND CAFFEINE 1 TABLET: 50; 325; 40 TABLET ORAL at 05:46

## 2024-07-26 RX ADMIN — LEVETIRACETAM 500 MG: 500 TABLET, FILM COATED ORAL at 10:00

## 2024-07-26 RX ADMIN — IPRATROPIUM BROMIDE AND ALBUTEROL SULFATE 3 ML: .5; 3 SOLUTION RESPIRATORY (INHALATION) at 06:42

## 2024-07-26 RX ADMIN — Medication 10 ML: at 09:56

## 2024-07-26 RX ADMIN — NICOTINE 1 PATCH: 21 PATCH TRANSDERMAL at 09:56

## 2024-07-26 RX ADMIN — PREGABALIN 300 MG: 75 CAPSULE ORAL at 10:00

## 2024-07-26 RX ADMIN — METOPROLOL SUCCINATE 75 MG: 25 TABLET, EXTENDED RELEASE ORAL at 09:55

## 2024-07-26 RX ADMIN — LOSARTAN POTASSIUM 50 MG: 50 TABLET, FILM COATED ORAL at 09:55

## 2024-07-26 RX ADMIN — CITALOPRAM HYDROBROMIDE 10 MG: 20 TABLET ORAL at 09:55

## 2024-07-26 RX ADMIN — DANTROLENE SODIUM 25 MG: 25 CAPSULE ORAL at 09:55

## 2024-07-26 RX ADMIN — BUTALBITAL, ACETAMINOPHEN, AND CAFFEINE 1 TABLET: 50; 325; 40 TABLET ORAL at 09:54

## 2024-07-26 RX ADMIN — DIVALPROEX SODIUM 500 MG: 500 TABLET, DELAYED RELEASE ORAL at 10:00

## 2024-07-26 RX ADMIN — ASPIRIN 81 MG CHEWABLE TABLET 81 MG: 81 TABLET CHEWABLE at 09:55

## 2024-07-26 RX ADMIN — ENOXAPARIN SODIUM 40 MG: 100 INJECTION SUBCUTANEOUS at 05:33

## 2024-07-26 RX ADMIN — BUDESONIDE 0.5 MG: 0.5 INHALANT RESPIRATORY (INHALATION) at 06:42

## 2024-07-26 RX ADMIN — PANTOPRAZOLE SODIUM 40 MG: 40 TABLET, DELAYED RELEASE ORAL at 09:55

## 2024-07-26 NOTE — NURSING NOTE
Report called to Che at [facility] at 11:30. EMS called to transport patient at 11:40. Patient left via stretcher with EMS at 14:35.

## 2024-07-26 NOTE — DISCHARGE SUMMARY
[facility]   DISCHARGE SUMMARY      Name:  Roxi Valerio   Age:  49 y.o.  Sex:  female  :  1975  MRN:  [id number]   Visit Number:  [id number]    Admission Date:  2024  Date of Discharge:  2024  Primary Care Physician:  System, Provider Not In    Important issues to note:    Patient will be discharged for placement at [facility] and rehab    Discharge Diagnoses:     Hypertensive urgency, POA.  Inability to care for self.  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  Essential hypertension.  COPD.  Obesity with a BMI of 39.    Problem List:     Active Hospital Problems    Diagnosis  POA    **Hypertensive urgency [I16.0]  Yes    Unable to care for self [Z78.9]  Yes      Resolved Hospital Problems   No resolved problems to display.     Presenting Problem:    Chief Complaint   Patient presents with    Wellness Check      Consults:     Consulting Physician(s)                     None              Procedures Performed:        History of presenting illness/Hospital Course:    49-year-old with history of prior TBI, large left ICH, right hemiparesis, aphasia, hydrocephalus with a right frontal VPS, hypertension, COPD, who presented to the emergency room with weakness, inability to provide her care at home.     Workup in the emergency room the patient was hypertensive.  She had possible UTI.  CT scan of the head was unremarkable.  She was admitted to the hospital service.    Patient was overall hemodynamically stable throughout hospital stay.  She was restarted on home blood pressure medications with improvement noted.  After discussion with case management, family, was determined patient would need placement as she cannot go back to prior living arrangement.  Awaited insurance approval, bed availability at facilities.  This has been arranged for today.  Patient is otherwise hemodynamically stable for discharge.    Of note, continue monitoring of her blood  pressure upon discharge.  Recommend following up with her prior neurologist.    Vital Signs:    Temp:  [97 °F (36.1 °C)-98.1 °F (36.7 °C)] 97.5 °F (36.4 °C)  Heart Rate:  [74-97] 87  Resp:  [18] 18  BP: (114-167)/(76-99) 153/96    Physical Exam:    General Appearance:  Alert and cooperative.  No acute changes   Head:  Atraumatic and normocephalic.   Eyes: Conjunctivae and sclerae normal, no icterus. No pallor.   Ears:  Ears with no abnormalities noted.   Throat: No oral lesions, no thrush, oral mucosa moist.   Neck: Supple, trachea midline, no thyromegaly.   Back:   No kyphoscoliosis present. No tenderness to palpation.   Lungs:   Breath sounds heard bilaterally equally.  No crackles or wheezing. No Pleural rub or bronchial breathing.   Heart:  Normal S1 and S2, no murmur, no gallop, no rub. No JVD.   Abdomen:   Normal bowel sounds, no masses, no organomegaly. Soft, nontender, nondistended, no rebound tenderness.   Extremities: Supple, no edema, no cyanosis, no clubbing.   Pulses: Pulses palpable bilaterally.   Skin: No bleeding or rash.   Neurologic: Alert and oriented x person/place.  Unchanged     Pertinent Lab Results:     Results from last 7 days   Lab Units 07/22/24  1714 07/22/24  0629 07/21/24  2320 07/21/24  0643 07/20/24  2304   SODIUM mmol/L  --  146*  --  145 144   POTASSIUM mmol/L 4.0 3.6 3.8 2.6* 4.6   CHLORIDE mmol/L  --  109*  --  109* 108*   CO2 mmol/L  --  19.9*  --  23.1 23.2   BUN mg/dL  --  14  --  11 12   CREATININE mg/dL  --  0.64  --  0.67 0.72   CALCIUM mg/dL  --  9.1  --  9.0 9.0   BILIRUBIN mg/dL  --   --   --   --  0.3   ALK PHOS U/L  --   --   --   --  103   ALT (SGPT) U/L  --   --   --   --  14   AST (SGOT) U/L  --   --   --   --  17   GLUCOSE mg/dL  --  93  --  89 91     Results from last 7 days   Lab Units 07/22/24  0629 07/21/24  0643 07/20/24  2304   WBC 10*3/mm3 6.09 6.97 7.75   HEMOGLOBIN g/dL 13.9 14.0 14.3   HEMATOCRIT % 42.3 41.9 42.2   PLATELETS 10*3/mm3 225 227 052                                    Pertinent Radiology Results:    Imaging Results (All)       Procedure Component Value Units Date/Time    XR Chest 1 View [[id number]] Collected: 07/22/24 1230     Updated: 07/22/24 1300    Narrative:      PROCEDURE: XR CHEST 1 VW-        HISTORY: Acute cough     COMPARISON: December 2022.     FINDINGS: Apical lordotic view. The heart is normal in size. The  mediastinum is unremarkable. The lungs are clear. There is no  pneumothorax. There are no acute osseous abnormalities.       Impression:      No acute cardiopulmonary process.                       Images were reviewed, interpreted, and dictated by Dr. Dorota Jauregui MD  Transcribed by Agnieszka Grace PA-C.     This report was signed and finalized on 7/22/2024 12:57 PM by Dorota Jauregui MD.       CT Head Without Contrast [[id number]] Collected: 07/20/24 2311     Updated: 07/21/24 0010    Narrative:      FINAL REPORT    TECHNIQUE:  Multiple axial CT images were performed from the foramen magnum  to the vertex. This study was performed with techniques to keep  radiation doses as low as reasonably achievable (ALARA).  Individualized dose reduction techniques using automated  exposure control or adjustment of mA and/or kV according to the  patient's size were employed.    CLINICAL HISTORY:  headache, hx of TBI    COMPARISON:  10/30/2023    FINDINGS:  There is a right frontal approach ventriculoperitoneal shunt.  There is significant left cerebral encephalomalacia, unchanged.  The patient is status post left craniectomy.  No acute  intracranial hemorrhage.      Impression:      No acute intracranial hemorrhage.    Authenticated and Electronically Signed by Keven Quevedo MD on  07/21/2024 12:09:02 AM            Echo:      Condition on Discharge:      Stable.    Code status during the hospital stay:    Code Status and Medical Interventions:   Ordered at: 07/21/24 0043     Code Status (Patient has no pulse and is not breathing):    CPR (Attempt to  Resuscitate)     Medical Interventions (Patient has pulse or is breathing):    Full Support     Discharge Disposition:    Rehab Facility or Unit (DC - External)    Discharge Medications:       Discharge Medications        New Medications        Instructions Start Date   nicotine 21 MG/24HR patch  Commonly known as: NICODERM CQ   1 patch, Transdermal, Every 24 Hours Scheduled   Start Date: July 27, 2024            Changes to Medications        Instructions Start Date   citalopram 10 MG tablet  Commonly known as: CeleXA  What changed: how much to take   10 mg, Oral, Daily      metoprolol succinate XL 25 MG 24 hr tablet  Commonly known as: Toprol XL  What changed:   medication strength  how much to take   75 mg, Oral, Daily   Start Date: July 27, 2024            Continue These Medications        Instructions Start Date   acetaminophen 325 MG tablet  Commonly known as: TYLENOL   650 mg, Oral, Every 6 Hours PRN      Albuterol Sulfate (sensor) 108 (90 Base) MCG/ACT aerosol powder    2 puffs, Inhalation, Every 6 Hours PRN, For wheezing      amitriptyline 50 MG tablet  Commonly known as: ELAVIL   50 mg, Oral, Nightly      Anoro Ellipta 62.5-25 MCG/ACT aerosol powder  inhaler  Generic drug: Umeclidinium-Vilanterol   1 puff, Inhalation, Daily - RT      aspirin 81 MG chewable tablet   81 mg, Oral, Daily      butalbital-acetaminophen-caffeine -40 MG per tablet  Commonly known as: Esgic   1 tablet, Oral, 2 Times Daily      dantrolene 25 MG capsule  Commonly known as: DANTRIUM   25 mg, Oral, 2 Times Daily      dilTIAZem  MG 24 hr capsule  Commonly known as: Cartia XT   240 mg, Oral, Daily      divalproex 500 MG DR tablet  Commonly known as: DEPAKOTE   500 mg, Oral, 2 Times Daily      docusate sodium 100 MG capsule  Commonly known as: COLACE   100 mg, Oral, 2 Times Daily      famotidine 20 MG tablet  Commonly known as: Pepcid   20 mg, Oral, Daily      fluticasone 44 MCG/ACT inhaler  Commonly known as: FLOVENT HFA   1  puff, Inhalation, 2 Times Daily - RT      fondaparinux 7.5 MG/0.6ML solution injection  Commonly known as: ARIXTRA   7.5 mg, Subcutaneous, Every 24 Hours Scheduled      HYDROcodone-acetaminophen 7.5-325 MG per tablet  Commonly known as: NORCO   1 tablet, Oral, Every 8 Hours PRN      hydrocortisone 2.5 % cream   1 Application, Topical, 2 Times Daily      ipratropium-albuterol 0.5-2.5 mg/3 ml nebulizer  Commonly known as: DUO-NEB   6 mL, Nebulization, 2 Times Daily      levETIRAcetam 500 MG tablet  Commonly known as: KEPPRA   1,000 mg, Oral, 2 Times Daily      magnesium oxide 400 MG tablet  Commonly known as: MAG-OX   400 mg, Oral, Nightly      pregabalin 100 MG capsule  Commonly known as: LYRICA   300 mg, Oral, 2 Times Daily      sennosides-docusate 8.6-50 MG per tablet  Commonly known as: PERICOLACE   1 tablet, Oral, Daily             Stop These Medications      losartan 25 MG tablet  Commonly known as: COZAAR     metoprolol tartrate 25 MG tablet  Commonly known as: LOPRESSOR     pantoprazole 40 MG EC tablet  Commonly known as: PROTONIX     tiotropium 18 MCG per inhalation capsule  Commonly known as: SPIRIVA     traMADol 50 MG tablet  Commonly known as: ULTRAM            Discharge Diet:   As tolerated    Activity at Discharge:   As tolerated    Follow-up Appointments:     Contact information for follow-up providers       System, Provider Not In .    Contact information:  [facility] [id number]                       Contact information for after-discharge care       Destination       [facility] .    Service: Skilled Nursing  Contact information:  [address] [place] [id number] [phone]                                 No future appointments.  Test Results Pending at Discharge:           Katty Garcia DO  07/26/24  10:33 EDT    Time: I spent 24 minutes on this discharge activity which included: face-to-face encounter with the patient,  reviewing the data in the system, coordination of the care with the nursing staff as well as consultants, documentation, and entering orders.     Dictated utilizing Dragon dictation.

## 2024-07-26 NOTE — CASE MANAGEMENT/SOCIAL WORK
Case Management Discharge Note      Final Note: Discharge to [facility] this afternoon.    Provided Post Acute Provider List?: Refused  Provided Post Acute Provider Quality & Resource List?: Refused    Selected Continued Care - Admitted Since 7/20/2024       Destination Coordination complete.      Service Provider Selected Services Address Phone Fax Patient Preferred    [facility] [address] [address] [phone] --              Durable Medical Equipment    No services have been selected for the patient.                Dialysis/Infusion    No services have been selected for the patient.                Home Medical Care    No services have been selected for the patient.                Therapy    No services have been selected for the patient.                Community Resources    No services have been selected for the patient.                Community & DME    No services have been selected for the patient.                    Transportation Services  Ambulance: [facility]    Final Discharge Disposition Code: 03 - skilled nursing facility (SNF)

## 2024-10-21 ENCOUNTER — TRANSCRIBE ORDERS (OUTPATIENT)
Dept: ADMINISTRATIVE | Facility: HOSPITAL | Age: 49
End: 2024-10-21
Payer: MEDICAID

## 2024-10-21 DIAGNOSIS — G44.201 INTRACTABLE TENSION-TYPE HEADACHE, UNSPECIFIED CHRONICITY PATTERN: Primary | ICD-10-CM

## 2024-11-05 ENCOUNTER — HOSPITAL ENCOUNTER (OUTPATIENT)
Dept: CT IMAGING | Facility: HOSPITAL | Age: 49
Discharge: HOME | End: 2024-11-05
Admitting: FAMILY MEDICINE
Payer: MEDICAID

## 2024-11-05 DIAGNOSIS — G44.201 INTRACTABLE TENSION-TYPE HEADACHE, UNSPECIFIED CHRONICITY PATTERN: ICD-10-CM

## 2024-11-05 PROCEDURE — 70450 CT HEAD/BRAIN W/O DYE: CPT
